# Patient Record
Sex: FEMALE | Race: WHITE | Employment: OTHER | URBAN - NONMETROPOLITAN AREA
[De-identification: names, ages, dates, MRNs, and addresses within clinical notes are randomized per-mention and may not be internally consistent; named-entity substitution may affect disease eponyms.]

---

## 2017-01-05 ENCOUNTER — OFFICE VISIT (OUTPATIENT)
Dept: FAMILY MEDICINE CLINIC | Age: 67
End: 2017-01-05

## 2017-01-05 VITALS
WEIGHT: 168 LBS | HEIGHT: 68 IN | SYSTOLIC BLOOD PRESSURE: 112 MMHG | BODY MASS INDEX: 25.46 KG/M2 | HEART RATE: 64 BPM | DIASTOLIC BLOOD PRESSURE: 78 MMHG

## 2017-01-05 DIAGNOSIS — I47.1 SVT (SUPRAVENTRICULAR TACHYCARDIA) (HCC): Chronic | ICD-10-CM

## 2017-01-05 DIAGNOSIS — M19.90 ARTHRITIS: Chronic | ICD-10-CM

## 2017-01-05 PROCEDURE — 99213 OFFICE O/P EST LOW 20 MIN: CPT | Performed by: FAMILY MEDICINE

## 2017-01-05 RX ORDER — TRAMADOL HYDROCHLORIDE 50 MG/1
50 TABLET ORAL EVERY 6 HOURS PRN
Qty: 200 TABLET | Refills: 1 | Status: SHIPPED | OUTPATIENT
Start: 2017-01-05 | End: 2017-08-24 | Stop reason: SDUPTHER

## 2017-01-05 RX ORDER — CELECOXIB 200 MG/1
200 CAPSULE ORAL 2 TIMES DAILY
Qty: 180 CAPSULE | Refills: 1 | Status: SHIPPED | OUTPATIENT
Start: 2017-01-05 | End: 2017-08-24 | Stop reason: SDUPTHER

## 2017-01-05 RX ORDER — ALPRAZOLAM 0.5 MG/1
0.5 TABLET ORAL 2 TIMES DAILY PRN
Qty: 100 TABLET | Refills: 1 | Status: SHIPPED | OUTPATIENT
Start: 2017-01-05 | End: 2017-08-24 | Stop reason: SDUPTHER

## 2017-01-05 RX ORDER — ATENOLOL 50 MG/1
50 TABLET ORAL DAILY
Qty: 90 TABLET | Refills: 1 | Status: SHIPPED | OUTPATIENT
Start: 2017-01-05 | End: 2017-08-24 | Stop reason: SDUPTHER

## 2017-01-05 ASSESSMENT — ENCOUNTER SYMPTOMS
RESPIRATORY NEGATIVE: 1
BACK PAIN: 1
SHORTNESS OF BREATH: 0
GASTROINTESTINAL NEGATIVE: 1

## 2017-06-27 ENCOUNTER — OFFICE VISIT (OUTPATIENT)
Dept: FAMILY MEDICINE CLINIC | Age: 67
End: 2017-06-27

## 2017-06-27 VITALS
BODY MASS INDEX: 25.79 KG/M2 | WEIGHT: 170.2 LBS | HEART RATE: 64 BPM | SYSTOLIC BLOOD PRESSURE: 144 MMHG | DIASTOLIC BLOOD PRESSURE: 84 MMHG | HEIGHT: 68 IN

## 2017-06-27 DIAGNOSIS — I47.1 SVT (SUPRAVENTRICULAR TACHYCARDIA) (HCC): ICD-10-CM

## 2017-06-27 DIAGNOSIS — M19.90 ARTHRITIS: Primary | ICD-10-CM

## 2017-06-27 DIAGNOSIS — E11.9 TYPE 2 DIABETES MELLITUS WITHOUT COMPLICATION, WITHOUT LONG-TERM CURRENT USE OF INSULIN (HCC): ICD-10-CM

## 2017-06-27 PROCEDURE — 99213 OFFICE O/P EST LOW 20 MIN: CPT | Performed by: FAMILY MEDICINE

## 2017-06-27 ASSESSMENT — ENCOUNTER SYMPTOMS
GASTROINTESTINAL NEGATIVE: 1
RESPIRATORY NEGATIVE: 1
SHORTNESS OF BREATH: 0

## 2017-08-24 DIAGNOSIS — I47.1 SVT (SUPRAVENTRICULAR TACHYCARDIA) (HCC): Chronic | ICD-10-CM

## 2017-08-24 DIAGNOSIS — M19.90 ARTHRITIS: Chronic | ICD-10-CM

## 2017-08-24 RX ORDER — TRAMADOL HYDROCHLORIDE 50 MG/1
50 TABLET ORAL EVERY 6 HOURS PRN
Qty: 200 TABLET | Refills: 1 | Status: SHIPPED | OUTPATIENT
Start: 2017-08-24 | End: 2018-01-19 | Stop reason: SDUPTHER

## 2017-08-24 RX ORDER — ATENOLOL 50 MG/1
50 TABLET ORAL DAILY
Qty: 90 TABLET | Refills: 1 | Status: SHIPPED | OUTPATIENT
Start: 2017-08-24 | End: 2018-01-19 | Stop reason: SDUPTHER

## 2017-08-24 RX ORDER — CELECOXIB 200 MG/1
200 CAPSULE ORAL 2 TIMES DAILY
Qty: 180 CAPSULE | Refills: 1 | Status: SHIPPED | OUTPATIENT
Start: 2017-08-24 | End: 2018-01-19 | Stop reason: SDUPTHER

## 2017-08-24 RX ORDER — ALPRAZOLAM 0.5 MG/1
0.5 TABLET ORAL 2 TIMES DAILY PRN
Qty: 100 TABLET | Refills: 1 | Status: SHIPPED | OUTPATIENT
Start: 2017-08-24 | End: 2018-01-19 | Stop reason: SDUPTHER

## 2017-10-13 ENCOUNTER — HOSPITAL ENCOUNTER (OUTPATIENT)
Dept: MAMMOGRAPHY | Age: 67
Discharge: HOME | End: 2017-10-13
Payer: MEDICARE

## 2017-10-13 DIAGNOSIS — Z12.9 SCREENING FOR CANCER: ICD-10-CM

## 2017-10-13 PROCEDURE — G0202 SCR MAMMO BI INCL CAD: HCPCS

## 2018-01-19 ENCOUNTER — OFFICE VISIT (OUTPATIENT)
Dept: FAMILY MEDICINE CLINIC | Age: 68
End: 2018-01-19
Payer: MEDICARE

## 2018-01-19 ENCOUNTER — HOSPITAL ENCOUNTER (OUTPATIENT)
Age: 68
Discharge: HOME | End: 2018-01-19
Payer: MEDICARE

## 2018-01-19 ENCOUNTER — HOSPITAL ENCOUNTER (OUTPATIENT)
Dept: GENERAL RADIOLOGY | Age: 68
Discharge: HOME | End: 2018-01-19
Payer: MEDICARE

## 2018-01-19 VITALS
HEART RATE: 60 BPM | DIASTOLIC BLOOD PRESSURE: 80 MMHG | WEIGHT: 170.8 LBS | BODY MASS INDEX: 25.88 KG/M2 | SYSTOLIC BLOOD PRESSURE: 136 MMHG | HEIGHT: 68 IN

## 2018-01-19 DIAGNOSIS — E78.5 HYPERLIPIDEMIA, UNSPECIFIED HYPERLIPIDEMIA TYPE: ICD-10-CM

## 2018-01-19 DIAGNOSIS — Z12.11 SPECIAL SCREENING FOR MALIGNANT NEOPLASMS, COLON: ICD-10-CM

## 2018-01-19 DIAGNOSIS — M19.011 PRIMARY OSTEOARTHRITIS OF BOTH SHOULDERS: ICD-10-CM

## 2018-01-19 DIAGNOSIS — I47.1 PAT (PAROXYSMAL ATRIAL TACHYCARDIA) (HCC): ICD-10-CM

## 2018-01-19 DIAGNOSIS — F51.01 PRIMARY INSOMNIA: ICD-10-CM

## 2018-01-19 DIAGNOSIS — M19.90 ARTHRITIS: Chronic | ICD-10-CM

## 2018-01-19 DIAGNOSIS — M19.90 ARTHRITIS: Primary | Chronic | ICD-10-CM

## 2018-01-19 DIAGNOSIS — T50.905A ADVERSE EFFECT OF DRUG, INITIAL ENCOUNTER: ICD-10-CM

## 2018-01-19 DIAGNOSIS — I47.1 SVT (SUPRAVENTRICULAR TACHYCARDIA) (HCC): Chronic | ICD-10-CM

## 2018-01-19 DIAGNOSIS — Z91.81 AT HIGH RISK FOR FALLS: ICD-10-CM

## 2018-01-19 DIAGNOSIS — M19.012 PRIMARY OSTEOARTHRITIS OF BOTH SHOULDERS: ICD-10-CM

## 2018-01-19 PROCEDURE — 73030 X-RAY EXAM OF SHOULDER: CPT

## 2018-01-19 PROCEDURE — 1123F ACP DISCUSS/DSCN MKR DOCD: CPT | Performed by: FAMILY MEDICINE

## 2018-01-19 PROCEDURE — 3014F SCREEN MAMMO DOC REV: CPT | Performed by: FAMILY MEDICINE

## 2018-01-19 PROCEDURE — 4040F PNEUMOC VAC/ADMIN/RCVD: CPT | Performed by: FAMILY MEDICINE

## 2018-01-19 PROCEDURE — 99214 OFFICE O/P EST MOD 30 MIN: CPT | Performed by: FAMILY MEDICINE

## 2018-01-19 PROCEDURE — 1090F PRES/ABSN URINE INCON ASSESS: CPT | Performed by: FAMILY MEDICINE

## 2018-01-19 PROCEDURE — G8419 CALC BMI OUT NRM PARAM NOF/U: HCPCS | Performed by: FAMILY MEDICINE

## 2018-01-19 PROCEDURE — G8400 PT W/DXA NO RESULTS DOC: HCPCS | Performed by: FAMILY MEDICINE

## 2018-01-19 PROCEDURE — 3017F COLORECTAL CA SCREEN DOC REV: CPT | Performed by: FAMILY MEDICINE

## 2018-01-19 PROCEDURE — 1036F TOBACCO NON-USER: CPT | Performed by: FAMILY MEDICINE

## 2018-01-19 PROCEDURE — G8484 FLU IMMUNIZE NO ADMIN: HCPCS | Performed by: FAMILY MEDICINE

## 2018-01-19 PROCEDURE — G8427 DOCREV CUR MEDS BY ELIG CLIN: HCPCS | Performed by: FAMILY MEDICINE

## 2018-01-19 RX ORDER — CELECOXIB 200 MG/1
200 CAPSULE ORAL 2 TIMES DAILY
Qty: 180 CAPSULE | Refills: 1 | Status: SHIPPED | OUTPATIENT
Start: 2018-01-19 | End: 2018-07-19 | Stop reason: SDUPTHER

## 2018-01-19 RX ORDER — ALPRAZOLAM 0.5 MG/1
0.5 TABLET ORAL 2 TIMES DAILY PRN
Qty: 60 TABLET | Refills: 2 | Status: SHIPPED | OUTPATIENT
Start: 2018-01-19 | End: 2018-01-23 | Stop reason: CLARIF

## 2018-01-19 RX ORDER — TRAMADOL HYDROCHLORIDE 50 MG/1
50 TABLET ORAL EVERY 6 HOURS PRN
Qty: 100 TABLET | Refills: 3 | Status: SHIPPED | OUTPATIENT
Start: 2018-01-19 | End: 2018-08-14 | Stop reason: SDUPTHER

## 2018-01-19 RX ORDER — ATENOLOL 50 MG/1
50 TABLET ORAL DAILY
Qty: 90 TABLET | Refills: 1 | Status: SHIPPED | OUTPATIENT
Start: 2018-01-19 | End: 2018-07-19 | Stop reason: SDUPTHER

## 2018-01-19 ASSESSMENT — ENCOUNTER SYMPTOMS
GASTROINTESTINAL NEGATIVE: 1
WHEEZING: 0
ABDOMINAL PAIN: 0
SHORTNESS OF BREATH: 0
BACK PAIN: 0
RESPIRATORY NEGATIVE: 1

## 2018-01-19 ASSESSMENT — PATIENT HEALTH QUESTIONNAIRE - PHQ9
SUM OF ALL RESPONSES TO PHQ QUESTIONS 1-9: 0
1. LITTLE INTEREST OR PLEASURE IN DOING THINGS: 0
SUM OF ALL RESPONSES TO PHQ9 QUESTIONS 1 & 2: 0
2. FEELING DOWN, DEPRESSED OR HOPELESS: 0

## 2018-01-19 NOTE — PROGRESS NOTES
On the basis of positive falls risk screening, assessment and plan is as follows: home safety tips provided.
Metabolic Panel    Lipid Panel   5. Adverse effect of drug, initial encounter  CBC Auto Differential    Comprehensive Metabolic Panel    Lipid Panel   6. Hyperlipidemia, unspecified hyperlipidemia type  CBC Auto Differential    Comprehensive Metabolic Panel    Lipid Panel   7. At high risk for falls     8. Special screening for malignant neoplasms, colon  POCT Fecal Immunochemical Test (FIT)       Plan:    Needs XR's and ortho. Check labs. Controlled Substances Monitoring:     Attestation: The Prescription Monitoring Report for this patient was reviewed today. (Danielle Jarvis MD)  Documentation: No signs of potential drug abuse or diversion identified. (Danielle Jarvis MD)  Chronic Pain: Treatment objectives documented - patient is progressing appropriately. , Functional status reviewed - continues with improved or maintaining ADL's. (Danielle Jarvis MD)    No Follow-up on file.     Orders Placed:  Orders Placed This Encounter   Procedures    XR SHOULDER LEFT (MIN 2 VIEWS)     Standing Status:   Future     Number of Occurrences:   1     Standing Expiration Date:   1/19/2019     Order Specific Question:   Reason for exam:     Answer:   L>R pain and loss of motion    XR SHOULDER RIGHT (MIN 2 VIEWS)     Standing Status:   Future     Number of Occurrences:   1     Standing Expiration Date:   1/19/2019     Order Specific Question:   Reason for exam:     Answer:   L>R pain and loss of motion    CBC Auto Differential     Standing Status:   Future     Standing Expiration Date:   1/19/2019    Comprehensive Metabolic Panel     Standing Status:   Future     Standing Expiration Date:   1/19/2019    Lipid Panel     Standing Status:   Future     Standing Expiration Date:   1/19/2019     Order Specific Question:   Is Patient Fasting?/# of Hours     Answer:   12 hours    POCT Fecal Immunochemical Test (FIT)     Standing Status:   Future     Standing Expiration Date:   2/19/2018     Medications Prescribed:  Orders Placed This

## 2018-01-22 ENCOUNTER — TELEPHONE (OUTPATIENT)
Dept: FAMILY MEDICINE CLINIC | Age: 68
End: 2018-01-22

## 2018-01-22 NOTE — TELEPHONE ENCOUNTER
----- Message from Gerber Stewart MD sent at 1/22/2018  7:35 AM EST -----  XR shows a lot of arthritis and calcified bursitis. Could try some PT for better function, but should see ortho.

## 2018-01-22 NOTE — TELEPHONE ENCOUNTER
----- Message from Miley Martínez MD sent at 1/22/2018  7:35 AM EST -----  XR shows a lot of arthritis and calcified bursitis. Could try some PT for better function, but should see ortho.

## 2018-01-25 ENCOUNTER — HOSPITAL ENCOUNTER (OUTPATIENT)
Dept: OCCUPATIONAL THERAPY | Age: 68
Setting detail: THERAPIES SERIES
Discharge: HOME | End: 2018-01-25
Payer: MEDICARE

## 2018-01-25 PROCEDURE — G8984 CARRY CURRENT STATUS: HCPCS

## 2018-01-25 PROCEDURE — 97165 OT EVAL LOW COMPLEX 30 MIN: CPT

## 2018-01-25 PROCEDURE — G8985 CARRY GOAL STATUS: HCPCS

## 2018-01-25 PROCEDURE — 97535 SELF CARE MNGMENT TRAINING: CPT

## 2018-01-25 ASSESSMENT — PAIN DESCRIPTION - LOCATION: LOCATION: SHOULDER

## 2018-01-25 ASSESSMENT — PAIN DESCRIPTION - ORIENTATION: ORIENTATION: RIGHT;LEFT

## 2018-01-25 ASSESSMENT — PAIN DESCRIPTION - PAIN TYPE: TYPE: CHRONIC PAIN

## 2018-01-25 ASSESSMENT — PAIN SCALES - GENERAL: PAINLEVEL_OUTOF10: 5

## 2018-01-29 ENCOUNTER — HOSPITAL ENCOUNTER (OUTPATIENT)
Dept: OCCUPATIONAL THERAPY | Age: 68
Setting detail: THERAPIES SERIES
Discharge: HOME | End: 2018-01-29
Payer: MEDICARE

## 2018-01-29 PROCEDURE — 97110 THERAPEUTIC EXERCISES: CPT

## 2018-01-29 ASSESSMENT — PAIN SCALES - GENERAL: PAINLEVEL_OUTOF10: 4

## 2018-01-29 ASSESSMENT — PAIN DESCRIPTION - LOCATION: LOCATION: SHOULDER

## 2018-01-29 ASSESSMENT — PAIN DESCRIPTION - ORIENTATION: ORIENTATION: RIGHT;LEFT

## 2018-02-01 ENCOUNTER — NURSE ONLY (OUTPATIENT)
Dept: FAMILY MEDICINE CLINIC | Age: 68
End: 2018-02-01
Payer: MEDICARE

## 2018-02-01 DIAGNOSIS — M19.90 ARTHRITIS: Chronic | ICD-10-CM

## 2018-02-01 DIAGNOSIS — T50.905A ADVERSE EFFECT OF DRUG, INITIAL ENCOUNTER: ICD-10-CM

## 2018-02-01 DIAGNOSIS — E78.5 HYPERLIPIDEMIA, UNSPECIFIED HYPERLIPIDEMIA TYPE: ICD-10-CM

## 2018-02-01 LAB
ALBUMIN SERPL-MCNC: 4.7 G/DL (ref 3.5–5.1)
ALP BLD-CCNC: 106 U/L (ref 38–126)
ALT SERPL-CCNC: 16 U/L (ref 11–66)
ANION GAP SERPL CALCULATED.3IONS-SCNC: 15 MEQ/L (ref 8–16)
AST SERPL-CCNC: 22 U/L (ref 5–40)
BASOPHILS # BLD: 0.6 %
BASOPHILS ABSOLUTE: 0 THOU/MM3 (ref 0–0.1)
BILIRUB SERPL-MCNC: 0.5 MG/DL (ref 0.3–1.2)
BUN BLDV-MCNC: 21 MG/DL (ref 7–22)
CALCIUM SERPL-MCNC: 9.5 MG/DL (ref 8.5–10.5)
CHLORIDE BLD-SCNC: 103 MEQ/L (ref 98–111)
CHOLESTEROL, TOTAL: 204 MG/DL (ref 100–199)
CO2: 27 MEQ/L (ref 23–33)
CREAT SERPL-MCNC: 0.6 MG/DL (ref 0.4–1.2)
EOSINOPHIL # BLD: 3.6 %
EOSINOPHILS ABSOLUTE: 0.1 THOU/MM3 (ref 0–0.4)
GFR SERPL CREATININE-BSD FRML MDRD: > 90 ML/MIN/1.73M2
GLUCOSE BLD-MCNC: 139 MG/DL (ref 70–108)
HCT VFR BLD CALC: 41.8 % (ref 37–47)
HDLC SERPL-MCNC: 65 MG/DL
HEMOGLOBIN: 13.9 GM/DL (ref 12–16)
LDL CHOLESTEROL CALCULATED: 121 MG/DL
LYMPHOCYTES # BLD: 33 %
LYMPHOCYTES ABSOLUTE: 1.4 THOU/MM3 (ref 1–4.8)
MCH RBC QN AUTO: 30.2 PG (ref 27–31)
MCHC RBC AUTO-ENTMCNC: 33.4 GM/DL (ref 33–37)
MCV RBC AUTO: 90.4 FL (ref 81–99)
MONOCYTES # BLD: 6.4 %
MONOCYTES ABSOLUTE: 0.3 THOU/MM3 (ref 0.4–1.3)
NUCLEATED RED BLOOD CELLS: 0 /100 WBC
PDW BLD-RTO: 13.4 % (ref 11.5–14.5)
PLATELET # BLD: 204 THOU/MM3 (ref 130–400)
PMV BLD AUTO: 8.3 FL (ref 7.4–10.4)
POTASSIUM SERPL-SCNC: 4.5 MEQ/L (ref 3.5–5.2)
RBC # BLD: 4.62 MILL/MM3 (ref 4.2–5.4)
SEG NEUTROPHILS: 56.4 %
SEGMENTED NEUTROPHILS ABSOLUTE COUNT: 2.3 THOU/MM3 (ref 1.8–7.7)
SODIUM BLD-SCNC: 145 MEQ/L (ref 135–145)
TOTAL PROTEIN: 7.3 G/DL (ref 6.1–8)
TRIGL SERPL-MCNC: 88 MG/DL (ref 0–199)
WBC # BLD: 4.1 THOU/MM3 (ref 4.8–10.8)

## 2018-02-01 PROCEDURE — 36415 COLL VENOUS BLD VENIPUNCTURE: CPT | Performed by: FAMILY MEDICINE

## 2018-02-02 ENCOUNTER — TELEPHONE (OUTPATIENT)
Dept: FAMILY MEDICINE CLINIC | Age: 68
End: 2018-02-02

## 2018-02-06 ENCOUNTER — APPOINTMENT (OUTPATIENT)
Dept: OCCUPATIONAL THERAPY | Age: 68
End: 2018-02-06
Payer: MEDICARE

## 2018-02-06 ENCOUNTER — TELEPHONE (OUTPATIENT)
Dept: FAMILY MEDICINE CLINIC | Age: 68
End: 2018-02-06

## 2018-02-06 DIAGNOSIS — Z12.11 SPECIAL SCREENING FOR MALIGNANT NEOPLASMS, COLON: ICD-10-CM

## 2018-02-06 LAB
CONTROL: PRESENT
HEMOCCULT STL QL: NEGATIVE

## 2018-02-06 PROCEDURE — 82274 ASSAY TEST FOR BLOOD FECAL: CPT | Performed by: FAMILY MEDICINE

## 2018-02-08 ENCOUNTER — HOSPITAL ENCOUNTER (OUTPATIENT)
Dept: OCCUPATIONAL THERAPY | Age: 68
Setting detail: THERAPIES SERIES
Discharge: HOME | End: 2018-02-08
Payer: MEDICARE

## 2018-02-08 PROCEDURE — 97110 THERAPEUTIC EXERCISES: CPT

## 2018-02-08 ASSESSMENT — PAIN DESCRIPTION - LOCATION: LOCATION: SHOULDER

## 2018-02-08 ASSESSMENT — PAIN DESCRIPTION - ORIENTATION: ORIENTATION: RIGHT;LEFT

## 2018-02-08 ASSESSMENT — PAIN SCALES - GENERAL: PAINLEVEL_OUTOF10: 4

## 2018-02-14 ENCOUNTER — HOSPITAL ENCOUNTER (OUTPATIENT)
Dept: OCCUPATIONAL THERAPY | Age: 68
Setting detail: THERAPIES SERIES
Discharge: HOME | End: 2018-02-14
Payer: MEDICARE

## 2018-02-14 PROCEDURE — 97110 THERAPEUTIC EXERCISES: CPT

## 2018-02-22 ENCOUNTER — HOSPITAL ENCOUNTER (OUTPATIENT)
Dept: OCCUPATIONAL THERAPY | Age: 68
Setting detail: THERAPIES SERIES
Discharge: HOME | End: 2018-02-22
Payer: MEDICARE

## 2018-02-22 PROCEDURE — G8985 CARRY GOAL STATUS: HCPCS

## 2018-02-22 PROCEDURE — 97110 THERAPEUTIC EXERCISES: CPT

## 2018-02-22 PROCEDURE — 97535 SELF CARE MNGMENT TRAINING: CPT

## 2018-02-22 PROCEDURE — G8984 CARRY CURRENT STATUS: HCPCS

## 2018-02-22 NOTE — PROGRESS NOTES
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 Short term goal 3: Pt. will voice understanding of possible adaptive equipment that would increase ease with daily tasks and decrease stress on her joints. GOAL NOT MET - CONTINUE GOAL  Short term goal 4: Pt. will voice understanding of pain control strategies she can incorporate at home such as moist heat etc. for pain management. GOAL MET - GOAL DISCONTINUED  Long term goals  Time Frame for Long term goals : 4 weeks  Long term goal 1: GOAL INTIIATED: Report being able to complete hair care with  no more than moderate difficulty. Long term goal 2: GOAL INITIATED: Be able to use left arm to make a bank deposit with no more than moderate difficulty. OT G-codes  Functional Assessment Tool Used: UE Functional Scale  Score: 59  Functional Limitation: Carrying, moving and handling objects  Carrying, Moving and Handling Objects Current Status (): At least 20 percent but less than 40 percent impaired, limited or restricted  Carrying, Moving and Handling Objects Goal Status ():  At least 1 percent but less than 20 percent impaired, limited or restricted       Hawk Counter, OTR/L #[id number]

## 2018-03-01 ENCOUNTER — HOSPITAL ENCOUNTER (OUTPATIENT)
Dept: OCCUPATIONAL THERAPY | Age: 68
Setting detail: THERAPIES SERIES
Discharge: HOME | End: 2018-03-01
Payer: MEDICARE

## 2018-03-01 PROCEDURE — 97110 THERAPEUTIC EXERCISES: CPT

## 2018-03-06 ENCOUNTER — HOSPITAL ENCOUNTER (OUTPATIENT)
Dept: OCCUPATIONAL THERAPY | Age: 68
Setting detail: THERAPIES SERIES
Discharge: HOME | End: 2018-03-06
Payer: MEDICARE

## 2018-03-06 PROCEDURE — 97110 THERAPEUTIC EXERCISES: CPT

## 2018-03-08 ENCOUNTER — APPOINTMENT (OUTPATIENT)
Dept: OCCUPATIONAL THERAPY | Age: 68
End: 2018-03-08
Payer: MEDICARE

## 2018-03-15 ENCOUNTER — HOSPITAL ENCOUNTER (OUTPATIENT)
Dept: OCCUPATIONAL THERAPY | Age: 68
Setting detail: THERAPIES SERIES
Discharge: HOME | End: 2018-03-15
Payer: MEDICARE

## 2018-03-15 PROCEDURE — 97110 THERAPEUTIC EXERCISES: CPT

## 2018-03-15 ASSESSMENT — PAIN SCALES - GENERAL: PAINLEVEL_OUTOF10: 1

## 2018-03-15 ASSESSMENT — PAIN DESCRIPTION - ORIENTATION: ORIENTATION: RIGHT;LEFT

## 2018-03-15 ASSESSMENT — PAIN DESCRIPTION - LOCATION: LOCATION: SHOULDER

## 2018-03-19 ENCOUNTER — HOSPITAL ENCOUNTER (OUTPATIENT)
Dept: OCCUPATIONAL THERAPY | Age: 68
Setting detail: THERAPIES SERIES
Discharge: HOME | End: 2018-03-19
Payer: MEDICARE

## 2018-03-19 PROCEDURE — 97110 THERAPEUTIC EXERCISES: CPT

## 2018-03-19 PROCEDURE — G8985 CARRY GOAL STATUS: HCPCS

## 2018-03-19 PROCEDURE — G8984 CARRY CURRENT STATUS: HCPCS

## 2018-03-26 ENCOUNTER — HOSPITAL ENCOUNTER (OUTPATIENT)
Dept: OCCUPATIONAL THERAPY | Age: 68
Setting detail: THERAPIES SERIES
Discharge: HOME | End: 2018-03-26
Payer: MEDICARE

## 2018-03-26 PROCEDURE — 97110 THERAPEUTIC EXERCISES: CPT

## 2018-03-26 ASSESSMENT — PAIN SCALES - GENERAL: PAINLEVEL_OUTOF10: 2

## 2018-03-26 ASSESSMENT — PAIN DESCRIPTION - LOCATION: LOCATION: ARM;SHOULDER

## 2018-03-26 ASSESSMENT — PAIN DESCRIPTION - ORIENTATION: ORIENTATION: RIGHT;LEFT

## 2018-03-26 NOTE — PROGRESS NOTES
[facility]  OUTPATIENT OCCUPATIONAL THERAPY  Daily Note  [address] [facility]    Time In: 915  Time Out: 1000  Minutes: 45  Timed Code Treatment Minutes: 45 Minutes     Date: 3/26/2018  Patient Name: Jimmy Dolan        CSN: [id number]   : 1950  (78 y.o.)  Gender: female   Referring Practitioner: Dr. 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 Herrera Arellano       Restrictions/Precautions:  Restrictions/Precautions: General Precautions              Subjective:  Subjective: States that she thinks that her arms are \"doing pretty good. \" States that she has some aching today due to the upcoming rains. Pain:  Patient Currently in Pain: Yes (Reports that she feels as though her pain today is due to the upcoming weather changes.)  Pain Assessment: 0-10  Pain Level: 2  Pain Location: Arm, Shoulder  Pain Orientation: Right, Left       Objective:     Upper Extremity Function  UE PROM: pulleys for shoulder flexion and abduction x 15 reps each; aggressive supine PROM to both shoulders in all planes to patient tolerance  UE Stretching: bilateral shoulder stretch on wall x 5 reps; stretch with both hands behind head x 10 reps while supine  UE Strengthing: biodex at 80 speed x 3 minutes forward and 3 minutes backward; reaching activity with each UE - placing and removing clothespins from vertical post to patient's maximal shoulder flexion                                                Activity Tolerance: Additional Comments:  Tolereated treatment well    Assessment:  Assessment: Progressing toward goals    Patient Education:  Patient Education: ER stretch behind head while supine or reclined in chair            Plan:  Plan Comment: Continue per established POC  Specific instructions for Next Treatment: AROM left shoulder, stretching, ADL                      Darci Stephens, OTR/L #[id number]

## 2018-04-02 ENCOUNTER — HOSPITAL ENCOUNTER (OUTPATIENT)
Dept: OCCUPATIONAL THERAPY | Age: 68
Setting detail: THERAPIES SERIES
Discharge: HOME | End: 2018-04-02
Payer: MEDICARE

## 2018-04-02 PROCEDURE — 97110 THERAPEUTIC EXERCISES: CPT

## 2018-04-09 ENCOUNTER — HOSPITAL ENCOUNTER (OUTPATIENT)
Dept: OCCUPATIONAL THERAPY | Age: 68
Setting detail: THERAPIES SERIES
Discharge: HOME | End: 2018-04-09
Payer: MEDICARE

## 2018-04-09 PROCEDURE — 97110 THERAPEUTIC EXERCISES: CPT

## 2018-04-16 ENCOUNTER — HOSPITAL ENCOUNTER (OUTPATIENT)
Dept: OCCUPATIONAL THERAPY | Age: 68
Setting detail: THERAPIES SERIES
Discharge: HOME | End: 2018-04-16
Payer: MEDICARE

## 2018-04-16 PROCEDURE — G8986 CARRY D/C STATUS: HCPCS

## 2018-04-16 PROCEDURE — 97110 THERAPEUTIC EXERCISES: CPT

## 2018-04-16 PROCEDURE — G8985 CARRY GOAL STATUS: HCPCS

## 2018-07-19 ENCOUNTER — OFFICE VISIT (OUTPATIENT)
Dept: FAMILY MEDICINE CLINIC | Age: 68
End: 2018-07-19
Payer: MEDICARE

## 2018-07-19 VITALS
DIASTOLIC BLOOD PRESSURE: 80 MMHG | BODY MASS INDEX: 26.07 KG/M2 | SYSTOLIC BLOOD PRESSURE: 138 MMHG | WEIGHT: 172 LBS | HEIGHT: 68 IN

## 2018-07-19 DIAGNOSIS — M19.90 ARTHRITIS: Primary | Chronic | ICD-10-CM

## 2018-07-19 DIAGNOSIS — E11.9 TYPE 2 DIABETES MELLITUS WITHOUT COMPLICATION, WITHOUT LONG-TERM CURRENT USE OF INSULIN (HCC): Chronic | ICD-10-CM

## 2018-07-19 DIAGNOSIS — I47.1 SVT (SUPRAVENTRICULAR TACHYCARDIA) (HCC): Chronic | ICD-10-CM

## 2018-07-19 LAB — HBA1C MFR BLD: 6.1 %

## 2018-07-19 PROCEDURE — 3044F HG A1C LEVEL LT 7.0%: CPT | Performed by: FAMILY MEDICINE

## 2018-07-19 PROCEDURE — 1036F TOBACCO NON-USER: CPT | Performed by: FAMILY MEDICINE

## 2018-07-19 PROCEDURE — 1090F PRES/ABSN URINE INCON ASSESS: CPT | Performed by: FAMILY MEDICINE

## 2018-07-19 PROCEDURE — G8419 CALC BMI OUT NRM PARAM NOF/U: HCPCS | Performed by: FAMILY MEDICINE

## 2018-07-19 PROCEDURE — 2022F DILAT RTA XM EVC RTNOPTHY: CPT | Performed by: FAMILY MEDICINE

## 2018-07-19 PROCEDURE — 99213 OFFICE O/P EST LOW 20 MIN: CPT | Performed by: FAMILY MEDICINE

## 2018-07-19 PROCEDURE — G8427 DOCREV CUR MEDS BY ELIG CLIN: HCPCS | Performed by: FAMILY MEDICINE

## 2018-07-19 PROCEDURE — G8400 PT W/DXA NO RESULTS DOC: HCPCS | Performed by: FAMILY MEDICINE

## 2018-07-19 PROCEDURE — 1101F PT FALLS ASSESS-DOCD LE1/YR: CPT | Performed by: FAMILY MEDICINE

## 2018-07-19 PROCEDURE — 83036 HEMOGLOBIN GLYCOSYLATED A1C: CPT | Performed by: FAMILY MEDICINE

## 2018-07-19 PROCEDURE — 4040F PNEUMOC VAC/ADMIN/RCVD: CPT | Performed by: FAMILY MEDICINE

## 2018-07-19 PROCEDURE — 1123F ACP DISCUSS/DSCN MKR DOCD: CPT | Performed by: FAMILY MEDICINE

## 2018-07-19 PROCEDURE — 3017F COLORECTAL CA SCREEN DOC REV: CPT | Performed by: FAMILY MEDICINE

## 2018-07-19 RX ORDER — ATENOLOL 50 MG/1
50 TABLET ORAL DAILY
Qty: 90 TABLET | Refills: 1 | Status: SHIPPED | OUTPATIENT
Start: 2018-07-19 | End: 2018-07-19 | Stop reason: SDUPTHER

## 2018-07-19 RX ORDER — ATENOLOL 50 MG/1
50 TABLET ORAL DAILY
Qty: 90 TABLET | Refills: 1 | Status: SHIPPED | OUTPATIENT
Start: 2018-07-19 | End: 2019-01-17 | Stop reason: SDUPTHER

## 2018-07-19 RX ORDER — CELECOXIB 200 MG/1
200 CAPSULE ORAL 2 TIMES DAILY
Qty: 180 CAPSULE | Refills: 1 | Status: SHIPPED | OUTPATIENT
Start: 2018-07-19 | End: 2018-07-19 | Stop reason: SDUPTHER

## 2018-07-19 RX ORDER — CELECOXIB 200 MG/1
200 CAPSULE ORAL 2 TIMES DAILY
Qty: 180 CAPSULE | Refills: 1 | Status: SHIPPED | OUTPATIENT
Start: 2018-07-19 | End: 2019-01-17 | Stop reason: SDUPTHER

## 2018-07-19 RX ORDER — ALPRAZOLAM 0.5 MG/1
0.5 TABLET ORAL 2 TIMES DAILY PRN
COMMUNITY
End: 2018-09-14 | Stop reason: SDUPTHER

## 2018-07-19 ASSESSMENT — ENCOUNTER SYMPTOMS
SHORTNESS OF BREATH: 0
RESPIRATORY NEGATIVE: 1
GASTROINTESTINAL NEGATIVE: 1

## 2018-07-19 NOTE — PROGRESS NOTES
motion. Neck supple. No thyromegaly present. Cardiovascular: Normal rate, regular rhythm and normal heart sounds. Exam reveals no gallop and no friction rub. No murmur heard. Pulmonary/Chest: Effort normal and breath sounds normal.   Abdominal: Soft. She exhibits no mass. There is no splenomegaly or hepatomegaly. There is no tenderness. Musculoskeletal: She exhibits no edema or tenderness. Joint deformities. Neck with decreased ROM. Lymphadenopathy:     She has no cervical adenopathy. Neurological: She is alert and oriented to person, place, and time. Ambulatory. No tremors. Skin: Skin is warm and dry. No rash noted. Psychiatric: She has a normal mood and affect. Vitals reviewed. Assessment:       Diagnosis Orders   1. Arthritis  celecoxib (CELEBREX) 200 MG capsule    DISCONTINUED: celecoxib (CELEBREX) 200 MG capsule   2. SVT (supraventricular tachycardia) (HCC)  atenolol (TENORMIN) 50 MG tablet    DISCONTINUED: atenolol (TENORMIN) 50 MG tablet   3. Type 2 diabetes mellitus without complication, without long-term current use of insulin (HCC)  POCT glycosylated hemoglobin (Hb A1C)       Plan:    A1C today is 6.1%. Return in about 6 months (around 1/19/2019) for arthritis, check-up.     Orders Placed:  Orders Placed This Encounter   Procedures    POCT glycosylated hemoglobin (Hb A1C)     Medications Prescribed:  Orders Placed This Encounter   Medications    DISCONTD: atenolol (TENORMIN) 50 MG tablet     Sig: Take 1 tablet by mouth daily     Dispense:  90 tablet     Refill:  1    DISCONTD: celecoxib (CELEBREX) 200 MG capsule     Sig: Take 1 capsule by mouth 2 times daily     Dispense:  180 capsule     Refill:  1    atenolol (TENORMIN) 50 MG tablet     Sig: Take 1 tablet by mouth daily     Dispense:  90 tablet     Refill:  1    celecoxib (CELEBREX) 200 MG capsule     Sig: Take 1 capsule by mouth 2 times daily     Dispense:  180 capsule     Refill:  1         Electronically

## 2018-07-19 NOTE — PATIENT INSTRUCTIONS
Patient Education        Arthritis: Care Instructions  Your Care Instructions  Arthritis, also called osteoarthritis, is a breakdown of the cartilage that cushions your joints. When the cartilage wears down, your bones rub against each other. This causes pain and stiffness. Many people have some arthritis as they age. Arthritis most often affects the joints of the spine, hands, hips, knees, or feet. You can take simple measures to protect your joints, ease your pain, and help you stay active. Follow-up care is a key part of your treatment and safety. Be sure to make and go to all appointments, and call your doctor if you are having problems. It's also a good idea to know your test results and keep a list of the medicines you take. How can you care for yourself at home? · Stay at a healthy weight. Being overweight puts extra strain on your joints. · Talk to your doctor or physical therapist about exercises that will help ease joint pain. ¨ Stretch. You may enjoy gentle forms of yoga to help keep your joints and muscles flexible. ¨ Walk instead of jog. Other types of exercise that are less stressful on the joints include riding a bicycle, swimming, sabi chi, or water exercise. ¨ Lift weights. Strong muscles help reduce stress on your joints. Stronger thigh muscles, for example, take some of the stress off of the knees and hips. Learn the right way to lift weights so you do not make joint pain worse. · Take your medicines exactly as prescribed. Call your doctor if you think you are having a problem with your medicine. · Take pain medicines exactly as directed. ¨ If the doctor gave you a prescription medicine for pain, take it as prescribed. ¨ If you are not taking a prescription pain medicine, ask your doctor if you can take an over-the-counter medicine. · Use a cane, crutch, walker, or another device if you need help to get around. These can help rest your joints.  You also can use other things to make life easier, such as a higher toilet seat and padded handles on kitchen utensils. · Do not sit in low chairs, which can make it hard to get up. · Put heat or cold on your sore joints as needed. Use whichever helps you most. You also can take turns with hot and cold packs. ¨ Apply heat 2 or 3 times a day for 20 to 30 minutes-using a heating pad, hot shower, or hot pack-to relieve pain and stiffness. ¨ Put ice or a cold pack on your sore joint for 10 to 20 minutes at a time. Put a thin cloth between the ice and your skin. When should you call for help? Call your doctor now or seek immediate medical care if:    · You have sudden swelling, warmth, or pain in any joint.     · You have joint pain and a fever or rash.     · You have such bad pain that you cannot use a joint.    Watch closely for changes in your health, and be sure to contact your doctor if:    · You have mild joint symptoms that continue even with more than 6 weeks of care at home.     · You have stomach pain or other problems with your medicine. Where can you learn more? Go to https://[employer].bulletn.. org and sign in to your [employer] account. Enter B296 in the [employer] box to learn more about \"Arthritis: Care Instructions. \"     If you do not have an account, please click on the \"Sign Up Now\" link. Current as of: October 10, 2017  Content Version: 11.6  © 6836-9357 [employer] Care instructions adapted under license by [facility] ([facility]). If you have questions about a medical condition or this instruction, always ask your healthcare professional. [place] [address] any warranty or liability for your use of this information.

## 2018-08-14 DIAGNOSIS — M19.90 ARTHRITIS: Chronic | ICD-10-CM

## 2018-08-14 RX ORDER — TRAMADOL HYDROCHLORIDE 50 MG/1
50 TABLET ORAL EVERY 6 HOURS PRN
Qty: 100 TABLET | Refills: 2 | Status: SHIPPED | OUTPATIENT
Start: 2018-08-14 | End: 2019-01-07 | Stop reason: SDUPTHER

## 2018-08-14 NOTE — TELEPHONE ENCOUNTER
Fantasma Toney called requesting a refill on the following medications:  Requested Prescriptions     Pending Prescriptions Disp Refills    traMADol (ULTRAM) 50 MG tablet 100 tablet 3     Sig: Take 1 tablet by mouth every 6 hours as needed for Pain for up to 30 days. .     Pharmacy verified: DDD  . pv      Date of last visit: 7/19/18  Date of next visit (if applicable): 1/40/8376

## 2018-09-05 ENCOUNTER — HOSPITAL ENCOUNTER (OUTPATIENT)
Age: 68
Discharge: HOME | End: 2018-09-05
Payer: MEDICARE

## 2018-09-05 LAB
ALT SERPL-CCNC: 15 U/L (ref 11–66)
AST SERPL-CCNC: 19 U/L (ref 5–40)

## 2018-09-05 PROCEDURE — 36415 COLL VENOUS BLD VENIPUNCTURE: CPT

## 2018-09-05 PROCEDURE — 84450 TRANSFERASE (AST) (SGOT): CPT

## 2018-09-05 PROCEDURE — 84460 ALANINE AMINO (ALT) (SGPT): CPT

## 2018-09-14 DIAGNOSIS — I47.1 SVT (SUPRAVENTRICULAR TACHYCARDIA) (HCC): Chronic | ICD-10-CM

## 2018-09-14 DIAGNOSIS — F51.01 PRIMARY INSOMNIA: Primary | ICD-10-CM

## 2018-09-14 RX ORDER — ATENOLOL 50 MG/1
50 TABLET ORAL DAILY
Qty: 90 TABLET | Refills: 1 | Status: CANCELLED | OUTPATIENT
Start: 2018-09-14 | End: 2018-12-13

## 2018-09-14 RX ORDER — ALPRAZOLAM 0.5 MG/1
0.5 TABLET ORAL 2 TIMES DAILY PRN
Qty: 60 TABLET | Refills: 2 | Status: SHIPPED | OUTPATIENT
Start: 2018-09-14 | End: 2019-04-05 | Stop reason: SDUPTHER

## 2018-09-14 NOTE — TELEPHONE ENCOUNTER
Patient has a refill of the Atenolol. Pete Doherty called requesting a refill on the following medications:  Requested Prescriptions     Pending Prescriptions Disp Refills    ALPRAZolam (XANAX) 0.5 MG tablet 60 tablet 2     Sig: Take 1 tablet by mouth 2 times daily as needed for Sleep or Anxiety. .       Date of last visit: 7/19/2018  Date of next visit (if applicable):1/17/2019  Date of last refill: #60 x 2 on 1/19/2018  Pharmacy Name: Rosita Martinez RN

## 2018-10-24 ENCOUNTER — HOSPITAL ENCOUNTER (OUTPATIENT)
Dept: MAMMOGRAPHY | Age: 68
Discharge: HOME | End: 2018-10-24
Payer: MEDICARE

## 2018-10-24 DIAGNOSIS — Z12.39 SCREENING BREAST EXAMINATION: ICD-10-CM

## 2018-10-24 PROCEDURE — 77067 SCR MAMMO BI INCL CAD: CPT

## 2019-01-14 ENCOUNTER — TELEPHONE (OUTPATIENT)
Dept: FAMILY MEDICINE CLINIC | Age: 69
End: 2019-01-14

## 2019-01-17 ENCOUNTER — OFFICE VISIT (OUTPATIENT)
Dept: FAMILY MEDICINE CLINIC | Age: 69
End: 2019-01-17
Payer: MEDICARE

## 2019-01-17 VITALS
SYSTOLIC BLOOD PRESSURE: 136 MMHG | DIASTOLIC BLOOD PRESSURE: 88 MMHG | HEART RATE: 72 BPM | HEIGHT: 68 IN | WEIGHT: 181 LBS | BODY MASS INDEX: 27.43 KG/M2

## 2019-01-17 DIAGNOSIS — I47.1 SVT (SUPRAVENTRICULAR TACHYCARDIA) (HCC): Chronic | ICD-10-CM

## 2019-01-17 DIAGNOSIS — M19.90 ARTHRITIS: Chronic | ICD-10-CM

## 2019-01-17 DIAGNOSIS — E11.9 TYPE 2 DIABETES MELLITUS WITHOUT COMPLICATION, WITHOUT LONG-TERM CURRENT USE OF INSULIN (HCC): ICD-10-CM

## 2019-01-17 PROCEDURE — 3017F COLORECTAL CA SCREEN DOC REV: CPT | Performed by: FAMILY MEDICINE

## 2019-01-17 PROCEDURE — 99214 OFFICE O/P EST MOD 30 MIN: CPT | Performed by: FAMILY MEDICINE

## 2019-01-17 PROCEDURE — G8427 DOCREV CUR MEDS BY ELIG CLIN: HCPCS | Performed by: FAMILY MEDICINE

## 2019-01-17 PROCEDURE — 1123F ACP DISCUSS/DSCN MKR DOCD: CPT | Performed by: FAMILY MEDICINE

## 2019-01-17 PROCEDURE — G8419 CALC BMI OUT NRM PARAM NOF/U: HCPCS | Performed by: FAMILY MEDICINE

## 2019-01-17 PROCEDURE — G8484 FLU IMMUNIZE NO ADMIN: HCPCS | Performed by: FAMILY MEDICINE

## 2019-01-17 PROCEDURE — 1036F TOBACCO NON-USER: CPT | Performed by: FAMILY MEDICINE

## 2019-01-17 PROCEDURE — G8400 PT W/DXA NO RESULTS DOC: HCPCS | Performed by: FAMILY MEDICINE

## 2019-01-17 PROCEDURE — 1090F PRES/ABSN URINE INCON ASSESS: CPT | Performed by: FAMILY MEDICINE

## 2019-01-17 PROCEDURE — 1101F PT FALLS ASSESS-DOCD LE1/YR: CPT | Performed by: FAMILY MEDICINE

## 2019-01-17 PROCEDURE — 4040F PNEUMOC VAC/ADMIN/RCVD: CPT | Performed by: FAMILY MEDICINE

## 2019-01-17 PROCEDURE — 2022F DILAT RTA XM EVC RTNOPTHY: CPT | Performed by: FAMILY MEDICINE

## 2019-01-17 PROCEDURE — 3046F HEMOGLOBIN A1C LEVEL >9.0%: CPT | Performed by: FAMILY MEDICINE

## 2019-01-17 RX ORDER — ATENOLOL 50 MG/1
50 TABLET ORAL DAILY
Qty: 90 TABLET | Refills: 1 | Status: SHIPPED | OUTPATIENT
Start: 2019-01-17 | End: 2019-07-15 | Stop reason: SDUPTHER

## 2019-01-17 RX ORDER — ALPRAZOLAM 0.5 MG/1
0.5 TABLET ORAL 2 TIMES DAILY PRN
COMMUNITY
End: 2020-08-05

## 2019-01-17 RX ORDER — CELECOXIB 200 MG/1
200 CAPSULE ORAL 2 TIMES DAILY
Qty: 180 CAPSULE | Refills: 1 | Status: SHIPPED | OUTPATIENT
Start: 2019-01-17 | End: 2019-07-15 | Stop reason: SDUPTHER

## 2019-01-17 ASSESSMENT — ENCOUNTER SYMPTOMS
ABDOMINAL PAIN: 0
GASTROINTESTINAL NEGATIVE: 1
RESPIRATORY NEGATIVE: 1
BACK PAIN: 1

## 2019-04-05 DIAGNOSIS — F51.01 PRIMARY INSOMNIA: ICD-10-CM

## 2019-04-05 DIAGNOSIS — M19.90 ARTHRITIS: Chronic | ICD-10-CM

## 2019-04-05 DIAGNOSIS — I47.1 SVT (SUPRAVENTRICULAR TACHYCARDIA) (HCC): Chronic | ICD-10-CM

## 2019-04-05 RX ORDER — TRAMADOL HYDROCHLORIDE 50 MG/1
50 TABLET ORAL EVERY 6 HOURS PRN
Qty: 100 TABLET | Refills: 0 | OUTPATIENT
Start: 2019-04-05 | End: 2019-05-05

## 2019-04-05 RX ORDER — ALPRAZOLAM 0.5 MG/1
TABLET ORAL
Qty: 60 TABLET | Refills: 1 | Status: SHIPPED | OUTPATIENT
Start: 2019-04-05 | End: 2019-07-15 | Stop reason: SDUPTHER

## 2019-04-05 NOTE — TELEPHONE ENCOUNTER
Controlled Substances Monitoring:     RX Monitoring 4/5/2019   Attestation The Prescription Monitoring Report for this patient was reviewed today. Chronic Pain Routine Monitoring Possible medication side effects, risk of tolerance/dependence & alternative treatments discussed. Chronic Pain > [de-identified] MEDD -         Karyn has recently gotten other pain meds. I can fill the Xanax, but not the Tramadol.

## 2019-04-05 NOTE — TELEPHONE ENCOUNTER
Bill Harden called requesting a refill on the following medications:  Requested Prescriptions     Pending Prescriptions Disp Refills    ALPRAZolam (XANAX) 0.5 MG tablet [Pharmacy Med Name: ALPRAZOLAM 0.5 MG TABLET] 60 tablet 0     Sig: Take 1 tablet by mouth 2 times daily as needed for Sleep or Anxiety    traMADol (ULTRAM) 50 MG tablet [Pharmacy Med Name: TRAMADOL HCL 50 MG TABLET] 100 tablet 0     Sig: Take 1 tablet by mouth every 6 hours as needed for Pain for up to 30 days.        Date of last visit: 1/17/2019  Date of next visit (if applicable):7/15/2019  Date of last refill: Xanax 09/04/18 #60 x 2          Tramadol 01/07/19 #100 x NR  Pharmacy Name: Oxana Parry [facility] ([address] [place] [address])

## 2019-04-09 DIAGNOSIS — M19.90 ARTHRITIS: Chronic | ICD-10-CM

## 2019-04-09 RX ORDER — TRAMADOL HYDROCHLORIDE 50 MG/1
50 TABLET ORAL EVERY 6 HOURS PRN
Qty: 100 TABLET | Refills: 0 | Status: SHIPPED | OUTPATIENT
Start: 2019-04-09 | End: 2019-04-12 | Stop reason: SDUPTHER

## 2019-04-09 NOTE — TELEPHONE ENCOUNTER
Jas Green called requesting a refill on the following medications:  Requested Prescriptions     Pending Prescriptions Disp Refills    traMADol (ULTRAM) 50 MG tablet 100 tablet 0     Sig: Take 1 tablet by mouth every 6 hours as needed for Pain for up to 30 days. MEDICATION WAS DENIED DUE TO PATIENT BEING ON OTHER PAIN MEDICATIONS. PATIENT STATES THAT SHE WAS ON OTHER MEDICATION DUE TO SURGERY BUT IS NO LONGER ON THEM. WANTING TO KNOW IF DR. Francis Treviño WILL REFILL TRAMADOL.  PLEASE ADVISE      Date of last visit: 1/17/2019  Date of next visit (if applicable):7/15/2019  Date of last refill: 01/07/19 #100 x NR  Pharmacy Name: Muna Mendes,  Darrick Floyd CMA ([facility])

## 2019-04-09 NOTE — TELEPHONE ENCOUNTER
Surgery recovery and now back on Tramadol      Controlled Substances Monitoring:     RX Monitoring 4/9/2019   Attestation The Prescription Monitoring Report for this patient was reviewed today. Chronic Pain Routine Monitoring Possible medication side effects, risk of tolerance/dependence & alternative treatments discussed. ;Obtaining appropriate analgesic effect of treatment.    Chronic Pain > 80 MEDD -

## 2019-04-12 DIAGNOSIS — M19.90 ARTHRITIS: Chronic | ICD-10-CM

## 2019-04-12 RX ORDER — TRAMADOL HYDROCHLORIDE 50 MG/1
50 TABLET ORAL EVERY 6 HOURS PRN
Qty: 100 TABLET | Refills: 0 | Status: SHIPPED | OUTPATIENT
Start: 2019-04-12 | End: 2019-05-30 | Stop reason: SDUPTHER

## 2019-04-12 NOTE — TELEPHONE ENCOUNTER
Gilles Sanchez called requesting a refill on the following medications:  Requested Prescriptions     Pending Prescriptions Disp Refills    traMADol (ULTRAM) 50 MG tablet 100 tablet 0     Sig: Take 1 tablet by mouth every 6 hours as needed for Pain for up to 30 days. Date of last visit: 1/17/2019  Date of next visit (if applicable):Visit date not found  Date of last refill:   Pharmacy Name: Chandrakant Martinez LPN  Lenora Maryland called and wanted to know if her Tramadol was renewed, in looking into it I see the script was sent to [facility] which she no longer uses. She now uses [facility]. I have it set up for you to approve and I will call [facility]

## 2019-05-30 DIAGNOSIS — M19.90 ARTHRITIS: Chronic | ICD-10-CM

## 2019-05-30 RX ORDER — TRAMADOL HYDROCHLORIDE 50 MG/1
50 TABLET ORAL EVERY 6 HOURS PRN
Qty: 100 TABLET | Refills: 1 | Status: SHIPPED | OUTPATIENT
Start: 2019-05-30 | End: 2019-09-05 | Stop reason: SDUPTHER

## 2019-05-30 NOTE — TELEPHONE ENCOUNTER
Controlled Substances Monitoring:     RX Monitoring 5/30/2019   Attestation The Prescription Monitoring Report for this patient was reviewed today. Chronic Pain Routine Monitoring Possible medication side effects, risk of tolerance/dependence & alternative treatments discussed. ;Obtaining appropriate analgesic effect of treatment.    Chronic Pain > 80 MEDD -

## 2019-05-30 NOTE — TELEPHONE ENCOUNTER
Leon Bazan called requesting a refill on the following medications:  Requested Prescriptions     Pending Prescriptions Disp Refills    traMADol (ULTRAM) 50 MG tablet [Pharmacy Med Name: TRAMADOL HCL 50 MG TABLET] 100 tablet 0     Sig: Take 1 tablet by mouth every 6 hours as needed for Pain for up to 30 days.        Date of last visit: 1/17/2019  Date of next visit (if applicable):7/15/2019  Date of last refill: 04/12/2019  [facility] [address] [place] [address]

## 2019-07-15 ENCOUNTER — OFFICE VISIT (OUTPATIENT)
Dept: FAMILY MEDICINE CLINIC | Age: 69
End: 2019-07-15
Payer: MEDICARE

## 2019-07-15 VITALS
HEIGHT: 67 IN | SYSTOLIC BLOOD PRESSURE: 132 MMHG | HEART RATE: 68 BPM | DIASTOLIC BLOOD PRESSURE: 88 MMHG | WEIGHT: 187.6 LBS | BODY MASS INDEX: 29.44 KG/M2

## 2019-07-15 DIAGNOSIS — F51.01 PRIMARY INSOMNIA: ICD-10-CM

## 2019-07-15 DIAGNOSIS — I47.1 SVT (SUPRAVENTRICULAR TACHYCARDIA) (HCC): Chronic | ICD-10-CM

## 2019-07-15 DIAGNOSIS — E11.9 TYPE 2 DIABETES MELLITUS WITHOUT COMPLICATION, WITHOUT LONG-TERM CURRENT USE OF INSULIN (HCC): Primary | Chronic | ICD-10-CM

## 2019-07-15 DIAGNOSIS — M19.90 ARTHRITIS: Chronic | ICD-10-CM

## 2019-07-15 DIAGNOSIS — Z12.11 SPECIAL SCREENING FOR MALIGNANT NEOPLASMS, COLON: ICD-10-CM

## 2019-07-15 PROCEDURE — 1036F TOBACCO NON-USER: CPT | Performed by: FAMILY MEDICINE

## 2019-07-15 PROCEDURE — G8427 DOCREV CUR MEDS BY ELIG CLIN: HCPCS | Performed by: FAMILY MEDICINE

## 2019-07-15 PROCEDURE — G8400 PT W/DXA NO RESULTS DOC: HCPCS | Performed by: FAMILY MEDICINE

## 2019-07-15 PROCEDURE — 2022F DILAT RTA XM EVC RTNOPTHY: CPT | Performed by: FAMILY MEDICINE

## 2019-07-15 PROCEDURE — 4040F PNEUMOC VAC/ADMIN/RCVD: CPT | Performed by: FAMILY MEDICINE

## 2019-07-15 PROCEDURE — 1090F PRES/ABSN URINE INCON ASSESS: CPT | Performed by: FAMILY MEDICINE

## 2019-07-15 PROCEDURE — 3017F COLORECTAL CA SCREEN DOC REV: CPT | Performed by: FAMILY MEDICINE

## 2019-07-15 PROCEDURE — 3046F HEMOGLOBIN A1C LEVEL >9.0%: CPT | Performed by: FAMILY MEDICINE

## 2019-07-15 PROCEDURE — 3288F FALL RISK ASSESSMENT DOCD: CPT | Performed by: FAMILY MEDICINE

## 2019-07-15 PROCEDURE — G8510 SCR DEP NEG, NO PLAN REQD: HCPCS | Performed by: FAMILY MEDICINE

## 2019-07-15 PROCEDURE — G8419 CALC BMI OUT NRM PARAM NOF/U: HCPCS | Performed by: FAMILY MEDICINE

## 2019-07-15 PROCEDURE — 1123F ACP DISCUSS/DSCN MKR DOCD: CPT | Performed by: FAMILY MEDICINE

## 2019-07-15 PROCEDURE — 99213 OFFICE O/P EST LOW 20 MIN: CPT | Performed by: FAMILY MEDICINE

## 2019-07-15 RX ORDER — CELECOXIB 200 MG/1
200 CAPSULE ORAL 2 TIMES DAILY
Qty: 180 CAPSULE | Refills: 1 | Status: SHIPPED | OUTPATIENT
Start: 2019-07-15 | End: 2020-01-13 | Stop reason: SDUPTHER

## 2019-07-15 RX ORDER — ALPRAZOLAM 0.5 MG/1
0.5 TABLET ORAL 2 TIMES DAILY PRN
Qty: 60 TABLET | Refills: 1 | Status: SHIPPED | OUTPATIENT
Start: 2019-07-15 | End: 2019-12-05 | Stop reason: SDUPTHER

## 2019-07-15 RX ORDER — ATENOLOL 50 MG/1
50 TABLET ORAL DAILY
Qty: 90 TABLET | Refills: 1 | Status: SHIPPED | OUTPATIENT
Start: 2019-07-15 | End: 2020-01-13 | Stop reason: SDUPTHER

## 2019-07-15 ASSESSMENT — ENCOUNTER SYMPTOMS
GASTROINTESTINAL NEGATIVE: 1
RESPIRATORY NEGATIVE: 1

## 2019-07-15 ASSESSMENT — PATIENT HEALTH QUESTIONNAIRE - PHQ9
2. FEELING DOWN, DEPRESSED OR HOPELESS: 1
SUM OF ALL RESPONSES TO PHQ QUESTIONS 1-9: 1
1. LITTLE INTEREST OR PLEASURE IN DOING THINGS: 0
SUM OF ALL RESPONSES TO PHQ QUESTIONS 1-9: 1
SUM OF ALL RESPONSES TO PHQ9 QUESTIONS 1 & 2: 1

## 2019-07-15 NOTE — PROGRESS NOTES
Procedures    Comprehensive Metabolic Panel     Standing Status:   Future     Standing Expiration Date:   7/14/2020    Lipid Panel     Standing Status:   Future     Standing Expiration Date:   7/14/2020     Order Specific Question:   Is Patient Fasting?/# of Hours     Answer:   12 hours    Hemoglobin A1C     Standing Status:   Future     Standing Expiration Date:   7/14/2020    POCT Fecal Immunochemical Test (FIT)     Standing Status:   Future     Standing Expiration Date:   8/15/2019     Medications Prescribed:  Orders Placed This Encounter   Medications    atenolol (TENORMIN) 50 MG tablet     Sig: Take 1 tablet by mouth daily     Dispense:  90 tablet     Refill:  1    celecoxib (CELEBREX) 200 MG capsule     Sig: Take 1 capsule by mouth 2 times daily     Dispense:  180 capsule     Refill:  1    ALPRAZolam (XANAX) 0.5 MG tablet     Sig: Take 1 tablet by mouth 2 times daily as needed for Sleep for up to 30 days.      Dispense:  60 tablet     Refill:  1         Electronically signed by Diamante Barrios 7/15/2019 at 9:29 AM

## 2019-07-25 ENCOUNTER — NURSE ONLY (OUTPATIENT)
Dept: FAMILY MEDICINE CLINIC | Age: 69
End: 2019-07-25
Payer: MEDICARE

## 2019-07-25 ENCOUNTER — TELEPHONE (OUTPATIENT)
Dept: FAMILY MEDICINE CLINIC | Age: 69
End: 2019-07-25

## 2019-07-25 DIAGNOSIS — E11.9 TYPE 2 DIABETES MELLITUS WITHOUT COMPLICATION, WITHOUT LONG-TERM CURRENT USE OF INSULIN (HCC): Chronic | ICD-10-CM

## 2019-07-25 DIAGNOSIS — Z12.11 SPECIAL SCREENING FOR MALIGNANT NEOPLASMS, COLON: ICD-10-CM

## 2019-07-25 LAB
ALBUMIN SERPL-MCNC: 4.3 G/DL (ref 3.5–5.1)
ALP BLD-CCNC: 112 U/L (ref 38–126)
ALT SERPL-CCNC: 17 U/L (ref 11–66)
ANION GAP SERPL CALCULATED.3IONS-SCNC: 9 MEQ/L (ref 8–16)
AST SERPL-CCNC: 22 U/L (ref 5–40)
AVERAGE GLUCOSE: 132 MG/DL (ref 70–126)
BILIRUB SERPL-MCNC: 0.3 MG/DL (ref 0.3–1.2)
BUN BLDV-MCNC: 19 MG/DL (ref 7–22)
CALCIUM SERPL-MCNC: 9.3 MG/DL (ref 8.5–10.5)
CHLORIDE BLD-SCNC: 104 MEQ/L (ref 98–111)
CHOLESTEROL, TOTAL: 212 MG/DL (ref 100–199)
CO2: 27 MEQ/L (ref 23–33)
CONTROL: PRESENT
CREAT SERPL-MCNC: 0.7 MG/DL (ref 0.4–1.2)
GFR SERPL CREATININE-BSD FRML MDRD: 83 ML/MIN/1.73M2
GLUCOSE BLD-MCNC: 144 MG/DL (ref 70–108)
HBA1C MFR BLD: 6.4 % (ref 4.4–6.4)
HDLC SERPL-MCNC: 48 MG/DL
HEMOCCULT STL QL: NORMAL
LDL CHOLESTEROL CALCULATED: 145 MG/DL
POTASSIUM SERPL-SCNC: 5 MEQ/L (ref 3.5–5.2)
SODIUM BLD-SCNC: 140 MEQ/L (ref 135–145)
TOTAL PROTEIN: 6.8 G/DL (ref 6.1–8)
TRIGL SERPL-MCNC: 96 MG/DL (ref 0–199)

## 2019-07-25 PROCEDURE — 36415 COLL VENOUS BLD VENIPUNCTURE: CPT | Performed by: FAMILY MEDICINE

## 2019-07-25 PROCEDURE — 82274 ASSAY TEST FOR BLOOD FECAL: CPT | Performed by: FAMILY MEDICINE

## 2019-09-05 DIAGNOSIS — M19.90 ARTHRITIS: Chronic | ICD-10-CM

## 2019-09-05 RX ORDER — TRAMADOL HYDROCHLORIDE 50 MG/1
50 TABLET ORAL EVERY 6 HOURS PRN
Qty: 100 TABLET | Refills: 0 | Status: SHIPPED | OUTPATIENT
Start: 2019-09-05 | End: 2020-01-13 | Stop reason: SDUPTHER

## 2019-09-05 NOTE — TELEPHONE ENCOUNTER
Jayme Hammond called requesting a refill on the following medications:  Requested Prescriptions     Pending Prescriptions Disp Refills    traMADol (ULTRAM) 50 MG tablet [Pharmacy Med Name: TRAMADOL HCL 50 MG TABLET] 100 tablet 0     Sig: Take 1 tablet by mouth every 6 hours as needed for Pain for up to 30 days.        Date of last visit: 7/15/2019  Date of next visit (if applicable):1/13/2020  Date of last refill: 05/30/2019  Pharmacy Name: Tonny Leyden Thanks, Orson Grana, [address] [place] [address]

## 2019-10-28 ENCOUNTER — HOSPITAL ENCOUNTER (OUTPATIENT)
Dept: MAMMOGRAPHY | Age: 69
Discharge: HOME | End: 2019-10-28
Payer: MEDICARE

## 2019-10-28 DIAGNOSIS — Z12.39 BREAST CANCER SCREENING: ICD-10-CM

## 2019-10-28 PROCEDURE — 77063 BREAST TOMOSYNTHESIS BI: CPT

## 2020-01-13 ENCOUNTER — OFFICE VISIT (OUTPATIENT)
Dept: FAMILY MEDICINE CLINIC | Age: 70
End: 2020-01-13
Payer: MEDICARE

## 2020-01-13 VITALS
SYSTOLIC BLOOD PRESSURE: 134 MMHG | HEIGHT: 68 IN | BODY MASS INDEX: 28.7 KG/M2 | WEIGHT: 189.4 LBS | DIASTOLIC BLOOD PRESSURE: 80 MMHG | HEART RATE: 76 BPM

## 2020-01-13 LAB — HBA1C MFR BLD: 6.3 %

## 2020-01-13 PROCEDURE — G0009 ADMIN PNEUMOCOCCAL VACCINE: HCPCS | Performed by: FAMILY MEDICINE

## 2020-01-13 PROCEDURE — G0008 ADMIN INFLUENZA VIRUS VAC: HCPCS | Performed by: FAMILY MEDICINE

## 2020-01-13 PROCEDURE — 83036 HEMOGLOBIN GLYCOSYLATED A1C: CPT | Performed by: FAMILY MEDICINE

## 2020-01-13 PROCEDURE — 3017F COLORECTAL CA SCREEN DOC REV: CPT | Performed by: FAMILY MEDICINE

## 2020-01-13 PROCEDURE — 1123F ACP DISCUSS/DSCN MKR DOCD: CPT | Performed by: FAMILY MEDICINE

## 2020-01-13 PROCEDURE — 3044F HG A1C LEVEL LT 7.0%: CPT | Performed by: FAMILY MEDICINE

## 2020-01-13 PROCEDURE — 90686 IIV4 VACC NO PRSV 0.5 ML IM: CPT | Performed by: FAMILY MEDICINE

## 2020-01-13 PROCEDURE — G0438 PPPS, INITIAL VISIT: HCPCS | Performed by: FAMILY MEDICINE

## 2020-01-13 PROCEDURE — 4040F PNEUMOC VAC/ADMIN/RCVD: CPT | Performed by: FAMILY MEDICINE

## 2020-01-13 PROCEDURE — 90732 PPSV23 VACC 2 YRS+ SUBQ/IM: CPT | Performed by: FAMILY MEDICINE

## 2020-01-13 PROCEDURE — G8482 FLU IMMUNIZE ORDER/ADMIN: HCPCS | Performed by: FAMILY MEDICINE

## 2020-01-13 RX ORDER — TRAMADOL HYDROCHLORIDE 50 MG/1
50 TABLET ORAL EVERY 6 HOURS PRN
Qty: 100 TABLET | Refills: 1 | Status: SHIPPED | OUTPATIENT
Start: 2020-01-13 | End: 2020-08-31 | Stop reason: SDUPTHER

## 2020-01-13 RX ORDER — ALPRAZOLAM 0.5 MG/1
TABLET ORAL
Qty: 60 TABLET | Refills: 1 | Status: SHIPPED | OUTPATIENT
Start: 2020-01-13 | End: 2020-06-01 | Stop reason: SDUPTHER

## 2020-01-13 RX ORDER — ATENOLOL 50 MG/1
50 TABLET ORAL DAILY
Qty: 90 TABLET | Refills: 3 | Status: SHIPPED | OUTPATIENT
Start: 2020-01-13 | End: 2020-08-31 | Stop reason: SDUPTHER

## 2020-01-13 RX ORDER — CELECOXIB 200 MG/1
200 CAPSULE ORAL 2 TIMES DAILY
Qty: 180 CAPSULE | Refills: 3 | Status: SHIPPED | OUTPATIENT
Start: 2020-01-13 | End: 2021-01-15 | Stop reason: SDUPTHER

## 2020-01-13 ASSESSMENT — ENCOUNTER SYMPTOMS
ABDOMINAL PAIN: 0
RESPIRATORY NEGATIVE: 1
GASTROINTESTINAL NEGATIVE: 1
SHORTNESS OF BREATH: 0

## 2020-01-13 ASSESSMENT — LIFESTYLE VARIABLES: HOW OFTEN DO YOU HAVE A DRINK CONTAINING ALCOHOL: 0

## 2020-01-13 ASSESSMENT — PATIENT HEALTH QUESTIONNAIRE - PHQ9
SUM OF ALL RESPONSES TO PHQ QUESTIONS 1-9: 1
SUM OF ALL RESPONSES TO PHQ QUESTIONS 1-9: 1

## 2020-01-13 NOTE — PROGRESS NOTES
[facility]  [address] [facility]  Dept: [phone]  Dept Fax: [phone]: [phone]    Visit Date: 1/13/2020    Lottie Golden is a 77 y. o.female who presents today for:   Chief Complaint   Patient presents with    Medicare AWV    6 Month Follow-Up         HPI:      Arthritis pain and loss of ROM. Active. Fair ADL's. Tramadol helpful. Redy to start vaccinations. SVT controlled and no symptoms. No syncope. Sugars well controlled with exercise and diet control. Current Outpatient Medications   Medication Sig Dispense Refill    traMADol (ULTRAM) 50 MG tablet Take 1 tablet by mouth every 6 hours as needed for Pain for up to 30 days. 100 tablet 1    ALPRAZolam (XANAX) 0.5 MG tablet Take 1 tablet by mouth 2 times daily as needed for Sleep or anxiety for up to 30days. 60 tablet 1    atenolol (TENORMIN) 50 MG tablet Take 1 tablet by mouth daily 90 tablet 3    celecoxib (CELEBREX) 200 MG capsule Take 1 capsule by mouth 2 times daily 180 capsule 3    ALPRAZolam (XANAX) 0.5 MG tablet Take 0.5 mg by mouth 2 times daily as needed for Sleep. Mauricio Crate therapeutic multivitamin-minerals (THERAGRAN-M) tablet Take 1 tablet by mouth daily. No current facility-administered medications for this visit. The patient has No Known Allergies. Subjective:      Review of Systems   Constitutional: Negative. Negative for activity change and appetite change. HENT: Negative. Respiratory: Negative. Negative for shortness of breath. Cardiovascular: Negative. Negative for chest pain. Gastrointestinal: Negative. Negative for abdominal pain. Genitourinary: Negative. Musculoskeletal: Positive for arthralgias and gait problem. Skin: Negative. Neurological: Negative for seizures and syncope. Hematological: Negative. Psychiatric/Behavioral: Positive for sleep disturbance.  Negative for dysphoric mood. Objective:     /80 (Site: Left Upper Arm, Position: Sitting, Cuff Size: Large Adult)   Pulse 76   Ht 5' 7.5\" (1.715 m)   Wt 189 lb 6.4 oz (85.9 kg)   BMI 29.23 kg/m²     Physical Exam  Vitals signs reviewed. Constitutional:       Appearance: She is well-developed and normal weight. Neck:      Musculoskeletal: Normal range of motion and neck supple. Thyroid: No thyromegaly. Cardiovascular:      Rate and Rhythm: Normal rate and regular rhythm. Heart sounds: Normal heart sounds. No murmur. No friction rub. No gallop. Pulmonary:      Effort: Pulmonary effort is normal.      Breath sounds: Normal breath sounds. Abdominal:      Palpations: Abdomen is soft. There is no hepatomegaly, splenomegaly or mass. Tenderness: There is no tenderness. Comments:      Musculoskeletal:         General: No tenderness. Lymphadenopathy:      Cervical: No cervical adenopathy. Skin:     General: Skin is warm and dry. Findings: No rash. Neurological:      Mental Status: She is alert and oriented to person, place, and time. Comments: Ambulatory. No tremors. Assessment:       Diagnosis Orders   1. Type 2 diabetes mellitus without complication, without long-term current use of insulin (HCC)  POCT glycosylated hemoglobin (Hb A1C)   2. Need for prophylactic vaccination against Streptococcus pneumoniae (pneumococcus)  Pneumococcal polysaccharide vaccine 23-valent greater than or equal to 1yo subcutaneous/IM   3. Need for prophylactic vaccination and inoculation against influenza  INFLUENZA, QUADV, 3 YRS AND OLDER, IM PF, PREFILL SYR OR SDV, 0.5ML (AFLURIA QUADV, PF)   4. Arthritis  traMADol (ULTRAM) 50 MG tablet    celecoxib (CELEBREX) 200 MG capsule   5. SVT (supraventricular tachycardia) (HCC)  ALPRAZolam (XANAX) 0.5 MG tablet    atenolol (TENORMIN) 50 MG tablet   6.  Primary insomnia  ALPRAZolam (XANAX) 0.5 MG tablet       Plan:      Controlled Substance

## 2020-06-01 RX ORDER — ALPRAZOLAM 0.5 MG/1
TABLET ORAL
Qty: 60 TABLET | Refills: 0 | Status: SHIPPED | OUTPATIENT
Start: 2020-06-01 | End: 2020-08-05 | Stop reason: SDUPTHER

## 2020-08-05 RX ORDER — ALPRAZOLAM 0.5 MG/1
TABLET ORAL
Qty: 60 TABLET | Refills: 0 | Status: SHIPPED | OUTPATIENT
Start: 2020-08-05 | End: 2020-10-06 | Stop reason: SDUPTHER

## 2020-08-05 NOTE — TELEPHONE ENCOUNTER
Johann Friedman called requesting a refill on the following medications:  Requested Prescriptions     Pending Prescriptions Disp Refills    ALPRAZolam (XANAX) 0.5 MG tablet 60 tablet 0     Sig: Take 1 tablet by mouth 2 times daily as needed for Sleep or anxiety for up to 30days.        Date of last visit: 6/1/2020  Date of next visit (if applicable):Visit date not found  Date of last refill: 07/01/2020  Pharmacy Name: Reginald Martinez LPN

## 2020-08-31 ENCOUNTER — OFFICE VISIT (OUTPATIENT)
Dept: FAMILY MEDICINE CLINIC | Age: 70
End: 2020-08-31
Payer: MEDICARE

## 2020-08-31 VITALS
SYSTOLIC BLOOD PRESSURE: 150 MMHG | WEIGHT: 187.6 LBS | DIASTOLIC BLOOD PRESSURE: 82 MMHG | BODY MASS INDEX: 28.43 KG/M2 | HEIGHT: 68 IN | HEART RATE: 64 BPM | TEMPERATURE: 97 F

## 2020-08-31 PROBLEM — L97.521 DIABETIC ULCER OF TOE OF LEFT FOOT ASSOCIATED WITH TYPE 2 DIABETES MELLITUS, LIMITED TO BREAKDOWN OF SKIN (HCC): Status: ACTIVE | Noted: 2020-08-31

## 2020-08-31 PROBLEM — I10 ESSENTIAL HYPERTENSION: Status: ACTIVE | Noted: 2020-08-31

## 2020-08-31 PROBLEM — E11.621 DIABETIC ULCER OF TOE OF LEFT FOOT ASSOCIATED WITH TYPE 2 DIABETES MELLITUS, LIMITED TO BREAKDOWN OF SKIN (HCC): Status: ACTIVE | Noted: 2020-08-31

## 2020-08-31 PROBLEM — L84 PRE-ULCERATIVE CALLUSES: Status: ACTIVE | Noted: 2020-08-31

## 2020-08-31 PROCEDURE — 4040F PNEUMOC VAC/ADMIN/RCVD: CPT | Performed by: FAMILY MEDICINE

## 2020-08-31 PROCEDURE — 1036F TOBACCO NON-USER: CPT | Performed by: FAMILY MEDICINE

## 2020-08-31 PROCEDURE — 1090F PRES/ABSN URINE INCON ASSESS: CPT | Performed by: FAMILY MEDICINE

## 2020-08-31 PROCEDURE — 2022F DILAT RTA XM EVC RTNOPTHY: CPT | Performed by: FAMILY MEDICINE

## 2020-08-31 PROCEDURE — 3044F HG A1C LEVEL LT 7.0%: CPT | Performed by: FAMILY MEDICINE

## 2020-08-31 PROCEDURE — G8427 DOCREV CUR MEDS BY ELIG CLIN: HCPCS | Performed by: FAMILY MEDICINE

## 2020-08-31 PROCEDURE — G8400 PT W/DXA NO RESULTS DOC: HCPCS | Performed by: FAMILY MEDICINE

## 2020-08-31 PROCEDURE — 99214 OFFICE O/P EST MOD 30 MIN: CPT | Performed by: FAMILY MEDICINE

## 2020-08-31 PROCEDURE — 1123F ACP DISCUSS/DSCN MKR DOCD: CPT | Performed by: FAMILY MEDICINE

## 2020-08-31 PROCEDURE — 3017F COLORECTAL CA SCREEN DOC REV: CPT | Performed by: FAMILY MEDICINE

## 2020-08-31 PROCEDURE — G8417 CALC BMI ABV UP PARAM F/U: HCPCS | Performed by: FAMILY MEDICINE

## 2020-08-31 RX ORDER — CELECOXIB 200 MG/1
200 CAPSULE ORAL 2 TIMES DAILY
Qty: 180 CAPSULE | Refills: 3 | Status: CANCELLED | OUTPATIENT
Start: 2020-08-31 | End: 2021-02-26

## 2020-08-31 RX ORDER — TRAMADOL HYDROCHLORIDE 50 MG/1
50 TABLET ORAL EVERY 6 HOURS PRN
Qty: 100 TABLET | Refills: 1 | Status: SHIPPED | OUTPATIENT
Start: 2020-08-31 | End: 2020-12-07 | Stop reason: SDUPTHER

## 2020-08-31 RX ORDER — ATENOLOL 50 MG/1
50 TABLET ORAL DAILY
Qty: 90 TABLET | Refills: 3 | Status: SHIPPED | OUTPATIENT
Start: 2020-08-31 | End: 2021-10-14 | Stop reason: SDUPTHER

## 2020-08-31 ASSESSMENT — ENCOUNTER SYMPTOMS: SHORTNESS OF BREATH: 0

## 2020-08-31 NOTE — PATIENT INSTRUCTIONS
Please call podiatrist as soon as possible due to current left foot ulcer needing care. Recommend custom deep cushion diabetic shoes with custom inserts for her feet. Bunions/hammertoes. Neuropathy. Pre-ulcerative callous and callous with shallow ulcers on left foot. Recommend Tylenol 1000 mg (2 of 500 mg)    To protect your eyes from damage due to diabetes, please schedule an appointment with your ophthalmologist. Please let us know if you need help doing this. Patient Education         Sleep Problems: Getting Past Barriers to Powering Down (02:42)  Your health professional recommends that you watch this short online health video. Learn how to reduce technology use before bed for better sleep. How to watch the video    Scan the QR code   OR Visit the website    [email or web address]   Current as of: January 31, 2020               Content Version: 12.5  © 2006-2020 [employer]. Care instructions adapted under license by [employer] ([facility]). If you have questions about a medical condition or this instruction, always ask your healthcare professional. [employer] any warranty or liability for your use of this information. Patient Education         Sleep Problems: Make a Plan to Power Down (02:34)  Your health professional recommends that you watch this short online health video. Take steps to reduce your technology use before bed. How to watch the video    Scan the QR code   OR Visit the website    [email or web address]   Current as of: January 31, 2020               Content Version: 12.5  © 2006-2020 [employer]. Care instructions adapted under license by [employer] ([facility]). If you have questions about a medical condition or this instruction, always ask your healthcare professional. [employer] any warranty or liability for your use of this information.

## 2020-08-31 NOTE — PROGRESS NOTES
[address] [place] [address], [address]  Phone:  [phone]  BBX:[phone]       Name: Anupama Degroot  :     Chief Complaint   Patient presents with    Diabetes       HPI:     Anupama Degroot is a 72 y.o. female who presents today for     HPI    DM -- has been welll controlled to her knowledge. She denies hypoglycemia. Does not check her feet. Does have podiatry due to toenails being difficult to cut. HTN -- up today, high anxiety today. SVT -- on tenormin. Stable w/o flare up  OA hands and knees, moderate -- on celebrex doing okay but pain can get severe. Stays active as possible, walking outside currently. Given Tramadol 100 tablet/RF 1 on 2020. No tingling    Anxiety -- most of her adult life. Worsened with  with epilepsy. Xanax 0.5 mg used intermittently. Refilled 2020 with 60. Fear of something is going to go wrong. At night, can't sleep without it. Current Outpatient Medications:     atenolol (TENORMIN) 50 MG tablet, Take 1 tablet by mouth daily, Disp: 90 tablet, Rfl: 3    traMADol (ULTRAM) 50 MG tablet, Take 1 tablet by mouth every 6 hours as needed for Pain for up to 90 days. , Disp: 100 tablet, Rfl: 1    Handicap Placard MISC, by Does not apply route Chronic imbalance and pain due to severe osteoarthritis, Disp: 1 each, Rfl: 0    ALPRAZolam (XANAX) 0.5 MG tablet, Take 1 tablet by mouth 2 times daily as needed for Sleep or anxiety for up to 30days. , Disp: 60 tablet, Rfl: 0    celecoxib (CELEBREX) 200 MG capsule, Take 1 capsule by mouth 2 times daily, Disp: 180 capsule, Rfl: 3    therapeutic multivitamin-minerals (THERAGRAN-M) tablet, Take 1 tablet by mouth daily. , Disp: , Rfl:     No Known Allergies    Review of Systems   Constitutional: Negative for fatigue and fever. Respiratory: Negative for shortness of breath. Cardiovascular: Negative for chest pain and leg swelling.        Objective:     BP (!) 150/82 (Site: Left Upper Arm, Position: Sitting, Cuff Size: Medium Adult)   Pulse 64   Temp 97 °F (36.1 °C) (Temporal)   Ht 5' 8\" (1.727 m)   Wt 187 lb 9.6 oz (85.1 kg)   BMI 28.52 kg/m²     BP Readings from Last 10 Encounters:   08/31/20 (!) 150/82   01/13/20 134/80   07/15/19 132/88   01/17/19 136/88   07/19/18 138/80   01/19/18 136/80   06/27/17 (!) 144/84   01/05/17 112/78   10/07/16 140/86   08/18/16 130/86         Physical Exam  Constitutional:       General: She is not in acute distress. Appearance: Normal appearance. She is not ill-appearing. Cardiovascular:      Rate and Rhythm: Normal rate and regular rhythm. Heart sounds: No murmur. Pulmonary:      Effort: Pulmonary effort is normal. No respiratory distress. Breath sounds: Normal breath sounds. No wheezing. Musculoskeletal:         General: No swelling. Neurological:      Mental Status: She is alert.    Psychiatric:         Mood and Affect: Mood normal.     Visual inspection:  Deformity/amputation: bunion and hammertoe make alignment difficult on both feet but worse on left  Skin lesions/pre-ulcerative calluses: present - left foot toe ulcer noted with callus on another toe  Edema: right- negative, left- negative    Sensory exam:  Monofilament sensation: abnormal - absent in toes and then decreased in foot  (minimum of 5 random plantar locations tested, avoiding callused areas - > 1 area with absence of sensation is + for neuropathy)    Plus at least one of the following:  Pulses: normal,   Pinprick: Impaired  Proprioception: Absent  Vibration (128 Hz): Absent    Diabetes labs reviewed:  Lab Results   Component Value Date    LABA1C 6.3 01/13/2020     No results found for: EAG  Lab Results   Component Value Date    LABA1C 6.3 01/13/2020    LABA1C 6.4 07/25/2019    LABA1C 6.1 07/19/2018       Lab Results   Component Value Date     07/25/2019    K 5.0 07/25/2019     07/25/2019    CO2 27 07/25/2019    BUN 19 07/25/2019 CREATININE 0.7 07/25/2019    CALCIUM 9.3 07/25/2019    GLUCOSE 144 (H) 07/25/2019        Lab Results   Component Value Date    CHOL 212 (H) 07/25/2019    CHOL 204 (H) 02/01/2018    CHOL 163 07/18/2016     Lab Results   Component Value Date    TRIG 96 07/25/2019    TRIG 88 02/01/2018    TRIG 125 07/18/2016     Lab Results   Component Value Date    HDL 48 07/25/2019    HDL 65 02/01/2018    HDL 47 07/18/2016     Lab Results   Component Value Date    LDLCALC 145 07/25/2019    LDLCALC 121 02/01/2018    LDLCALC 91 07/18/2016     Lab Results   Component Value Date    LABVLDL 19 06/22/2015    VLDL 25 07/18/2016     Lab Results   Component Value Date    CHOLHDLRATIO 4.8 06/22/2015       No results found for: Anders Phelan    No results found for: TSH   No results found for: [id number]   No results found for: MG   Lab Results   Component Value Date    ALT 17 07/25/2019    AST 22 07/25/2019    ALKPHOS 112 07/25/2019    BILITOT 0.3 07/25/2019        Assessment/Plan:     Abril Sanchez was seen today for diabetes. Diagnoses and all orders for this visit:    Type 2 diabetes mellitus without complication, without long-term current use of insulin (HCC)  -     TSH without Reflex; Future  -     Vitamin B12; Future  -     Lipid Panel; Future  -     Microalbumin / Creatinine Urine Ratio; Future  -     Hemoglobin A1C; Future  -     Comprehensive Metabolic Panel; Future  -     CBC Auto Differential; Future  -     HM DIABETES FOOT EXAM  -     Handicap Placard MISC; by Does not apply route Chronic imbalance and pain due to severe osteoarthritis    SVT (supraventricular tachycardia) (HCC)  -     atenolol (TENORMIN) 50 MG tablet; Take 1 tablet by mouth daily  -     TSH without Reflex; Future  -     Vitamin B12; Future  -     Lipid Panel; Future  -     Microalbumin / Creatinine Urine Ratio; Future  -     Hemoglobin A1C; Future  -     Comprehensive Metabolic Panel;  Future  -     CBC Auto Differential; Future  -     Handicap Placard MISC; by Does not apply route Chronic imbalance and pain due to severe osteoarthritis    Arthritis  -     traMADol (ULTRAM) 50 MG tablet; Take 1 tablet by mouth every 6 hours as needed for Pain for up to 90 days.  -     Handicap Placard MISC; by Does not apply route Chronic imbalance and pain due to severe osteoarthritis    Elevated blood pressure reading    Diabetic polyneuropathy associated with type 2 diabetes mellitus (HCC)  -     TSH without Reflex; Future  -     Vitamin B12; Future  -     Microalbumin / Creatinine Urine Ratio; Future  -     Hemoglobin A1C; Future  -     Comprehensive Metabolic Panel; Future  -     CBC Auto Differential; Future  -     External Referral To Podiatry    Bunion  -     External Referral To Podiatry    [place], bilateral  -     External Referral To Podiatry    Diabetic ulcer of toe of left foot associated with type 2 diabetes mellitus, limited to breakdown of skin (HCC)  -     TSH without Reflex; Future  -     Vitamin B12; Future  -     Lipid Panel; Future  -     Microalbumin / Creatinine Urine Ratio; Future  -     Hemoglobin A1C; Future  -     Comprehensive Metabolic Panel; Future  -     CBC Auto Differential; Future  -     External Referral To Podiatry    Pre-ulcerative calluses  -     External Referral To Podiatry      Most concerning is her shallow ulcer noted on left foot. She will call her podiatrist to get in as soon as possible. She will call here if she can't get in. Discussed use of Tramadol and benzo  She will call for refill of xanax, enough till Jan visit. 1/2 tablet may be sufficient. Also use more Tylenol at higher doses than 500 mg. (650 or 1000 mg)  Encouraged trying to \"power down\" at night instead of xanax. Eye exam recommended. Recommend custom deep cushion diabetic shoes with custom inserts for her feet. Bunions/hammertoes. Neuropathy. Pre-ulcerative callous and callous with shallow ulcers on left foot.       Return in about 4 months (around 1/14/2021) for follow up/AWV. Future Appointments   Date Time Provider Michael Peacei   1/15/2021  9:00 AM Karishma Soares MD [facility] [place]          This office note has been dictated. Effort was made to review for errors but some may have been missed. Please contact Miley Aggarwal of note for clarification if needed.        Electronically signed by Karishma Soares MD on 9/5/2020 at 6:51 AM

## 2020-09-11 ENCOUNTER — HOSPITAL ENCOUNTER (OUTPATIENT)
Age: 70
Discharge: HOME | End: 2020-09-11
Payer: MEDICARE

## 2020-09-11 DIAGNOSIS — I47.1 SVT (SUPRAVENTRICULAR TACHYCARDIA) (HCC): Chronic | ICD-10-CM

## 2020-09-11 DIAGNOSIS — E11.42 DIABETIC POLYNEUROPATHY ASSOCIATED WITH TYPE 2 DIABETES MELLITUS (HCC): ICD-10-CM

## 2020-09-11 DIAGNOSIS — L97.521 DIABETIC ULCER OF TOE OF LEFT FOOT ASSOCIATED WITH TYPE 2 DIABETES MELLITUS, LIMITED TO BREAKDOWN OF SKIN (HCC): ICD-10-CM

## 2020-09-11 DIAGNOSIS — E11.621 DIABETIC ULCER OF TOE OF LEFT FOOT ASSOCIATED WITH TYPE 2 DIABETES MELLITUS, LIMITED TO BREAKDOWN OF SKIN (HCC): ICD-10-CM

## 2020-09-11 DIAGNOSIS — E11.9 TYPE 2 DIABETES MELLITUS WITHOUT COMPLICATION, WITHOUT LONG-TERM CURRENT USE OF INSULIN (HCC): Chronic | ICD-10-CM

## 2020-09-11 LAB
ALBUMIN SERPL-MCNC: 4.4 G/DL (ref 3.5–5.1)
ALP BLD-CCNC: 90 U/L (ref 38–126)
ALT SERPL-CCNC: 18 U/L (ref 11–66)
ANION GAP SERPL CALCULATED.3IONS-SCNC: 9 MEQ/L (ref 8–16)
AST SERPL-CCNC: 19 U/L (ref 5–40)
AVERAGE GLUCOSE: 132 MG/DL (ref 70–126)
BASOPHILS # BLD: 0.8 %
BASOPHILS ABSOLUTE: 0 THOU/MM3 (ref 0–0.1)
BILIRUB SERPL-MCNC: 0.2 MG/DL (ref 0.3–1.2)
BUN BLDV-MCNC: 21 MG/DL (ref 7–22)
CALCIUM SERPL-MCNC: 9.4 MG/DL (ref 8.5–10.5)
CHLORIDE BLD-SCNC: 104 MEQ/L (ref 98–111)
CHOLESTEROL, TOTAL: 231 MG/DL (ref 100–199)
CO2: 26 MEQ/L (ref 23–33)
CREAT SERPL-MCNC: 0.8 MG/DL (ref 0.4–1.2)
CREATININE, URINE: 76.1 MG/DL
EOSINOPHIL # BLD: 2.8 %
EOSINOPHILS ABSOLUTE: 0.1 THOU/MM3 (ref 0–0.4)
ERYTHROCYTE [DISTWIDTH] IN BLOOD BY AUTOMATED COUNT: 12.8 % (ref 11.5–14.5)
ERYTHROCYTE [DISTWIDTH] IN BLOOD BY AUTOMATED COUNT: 46 FL (ref 35–45)
GFR SERPL CREATININE-BSD FRML MDRD: 71 ML/MIN/1.73M2
GLUCOSE BLD-MCNC: 167 MG/DL (ref 70–108)
HBA1C MFR BLD: 6.4 % (ref 4.4–6.4)
HCT VFR BLD CALC: 42.9 % (ref 37–47)
HDLC SERPL-MCNC: 46 MG/DL
HEMOGLOBIN: 13.7 GM/DL (ref 12–16)
IMMATURE GRANS (ABS): 0 THOU/MM3 (ref 0–0.07)
IMMATURE GRANULOCYTES: 0 %
LDL CHOLESTEROL CALCULATED: 164 MG/DL
LYMPHOCYTES # BLD: 30.1 %
LYMPHOCYTES ABSOLUTE: 1.2 THOU/MM3 (ref 1–4.8)
MCH RBC QN AUTO: 31.3 PG (ref 26–33)
MCHC RBC AUTO-ENTMCNC: 31.9 GM/DL (ref 32.2–35.5)
MCV RBC AUTO: 97.9 FL (ref 81–99)
MICROALBUMIN UR-MCNC: < 1.2 MG/DL
MICROALBUMIN/CREAT UR-RTO: 16 MG/G (ref 0–30)
MONOCYTES # BLD: 6.7 %
MONOCYTES ABSOLUTE: 0.3 THOU/MM3 (ref 0.4–1.3)
NUCLEATED RED BLOOD CELLS: 0 /100 WBC
PLATELET # BLD: 199 THOU/MM3 (ref 130–400)
PMV BLD AUTO: 9.9 FL (ref 9.4–12.4)
POTASSIUM SERPL-SCNC: 5.2 MEQ/L (ref 3.5–5.2)
RBC # BLD: 4.38 MILL/MM3 (ref 4.2–5.4)
SEG NEUTROPHILS: 59.6 %
SEGMENTED NEUTROPHILS ABSOLUTE COUNT: 2.3 THOU/MM3 (ref 1.8–7.7)
SODIUM BLD-SCNC: 139 MEQ/L (ref 135–145)
TOTAL PROTEIN: 7 G/DL (ref 6.1–8)
TRIGL SERPL-MCNC: 103 MG/DL (ref 0–199)
TSH SERPL DL<=0.05 MIU/L-ACNC: 2.46 UIU/ML (ref 0.4–4.2)
VITAMIN B-12: 433 PG/ML (ref 211–911)
WBC # BLD: 3.9 THOU/MM3 (ref 4.8–10.8)

## 2020-09-11 PROCEDURE — 84443 ASSAY THYROID STIM HORMONE: CPT

## 2020-09-11 PROCEDURE — 82043 UR ALBUMIN QUANTITATIVE: CPT

## 2020-09-11 PROCEDURE — 82607 VITAMIN B-12: CPT

## 2020-09-11 PROCEDURE — 83036 HEMOGLOBIN GLYCOSYLATED A1C: CPT

## 2020-09-11 PROCEDURE — 36415 COLL VENOUS BLD VENIPUNCTURE: CPT

## 2020-09-11 PROCEDURE — 85025 COMPLETE CBC W/AUTO DIFF WBC: CPT

## 2020-09-11 PROCEDURE — 80061 LIPID PANEL: CPT

## 2020-09-11 PROCEDURE — 80053 COMPREHEN METABOLIC PANEL: CPT

## 2020-10-22 ENCOUNTER — NURSE ONLY (OUTPATIENT)
Dept: FAMILY MEDICINE CLINIC | Age: 70
End: 2020-10-22
Payer: MEDICARE

## 2020-10-22 PROCEDURE — G0008 ADMIN INFLUENZA VIRUS VAC: HCPCS | Performed by: FAMILY MEDICINE

## 2020-10-22 PROCEDURE — 90694 VACC AIIV4 NO PRSRV 0.5ML IM: CPT | Performed by: FAMILY MEDICINE

## 2020-10-22 NOTE — PROGRESS NOTES
After obtaining consent, and per orders of Titus Salvador MD, Immunization(s) given during visit:    Immunizations Administered     Name Date Dose Route    Influenza, Quadv, adjuvanted, 65 yrs +, IM, PF (Fluad) 10/22/2020 0.5 mL Intramuscular    Site: Deltoid- Left    Lot: [id number]    NDC: 11898-578-37

## 2020-12-02 RX ORDER — ALPRAZOLAM 0.5 MG/1
TABLET ORAL
Qty: 60 TABLET | Refills: 0 | OUTPATIENT
Start: 2020-12-02 | End: 2021-03-04

## 2020-12-02 NOTE — TELEPHONE ENCOUNTER
Boris Deal called requesting a refill on the following medications:  Requested Prescriptions     Pending Prescriptions Disp Refills    ALPRAZolam (XANAX) 0.5 MG tablet 60 tablet 0     Sig: Take HALF tablet by mouth 1-2 times daily as needed for anxiety not helped with other methods     Pharmacy verified: yes  . pv      Date of last visit:   Date of next visit (if applicable):01/05/2021

## 2020-12-03 ENCOUNTER — TELEPHONE (OUTPATIENT)
Dept: FAMILY MEDICINE CLINIC | Age: 70
End: 2020-12-03

## 2020-12-03 NOTE — TELEPHONE ENCOUNTER
Fausto Turner calls concerned she is just about out of Xanax before the allotted time given by Dr. Bonnie Marquez. Encouraged her to make an appointment, appointment made.

## 2020-12-04 RX ORDER — ALPRAZOLAM 0.5 MG/1
TABLET ORAL
Qty: 10 TABLET | Refills: 0 | Status: SHIPPED | OUTPATIENT
Start: 2020-12-04 | End: 2020-12-07 | Stop reason: SDUPTHER

## 2020-12-07 ENCOUNTER — OFFICE VISIT (OUTPATIENT)
Dept: FAMILY MEDICINE CLINIC | Age: 70
End: 2020-12-07
Payer: MEDICARE

## 2020-12-07 VITALS
WEIGHT: 183.2 LBS | SYSTOLIC BLOOD PRESSURE: 142 MMHG | TEMPERATURE: 97 F | DIASTOLIC BLOOD PRESSURE: 84 MMHG | BODY MASS INDEX: 28.75 KG/M2 | HEIGHT: 67 IN | HEART RATE: 68 BPM

## 2020-12-07 PROBLEM — E78.00 PURE HYPERCHOLESTEROLEMIA: Status: ACTIVE | Noted: 2020-12-07

## 2020-12-07 PROCEDURE — 1090F PRES/ABSN URINE INCON ASSESS: CPT | Performed by: FAMILY MEDICINE

## 2020-12-07 PROCEDURE — 4040F PNEUMOC VAC/ADMIN/RCVD: CPT | Performed by: FAMILY MEDICINE

## 2020-12-07 PROCEDURE — G8400 PT W/DXA NO RESULTS DOC: HCPCS | Performed by: FAMILY MEDICINE

## 2020-12-07 PROCEDURE — 99214 OFFICE O/P EST MOD 30 MIN: CPT | Performed by: FAMILY MEDICINE

## 2020-12-07 PROCEDURE — 1036F TOBACCO NON-USER: CPT | Performed by: FAMILY MEDICINE

## 2020-12-07 PROCEDURE — 1123F ACP DISCUSS/DSCN MKR DOCD: CPT | Performed by: FAMILY MEDICINE

## 2020-12-07 PROCEDURE — G8417 CALC BMI ABV UP PARAM F/U: HCPCS | Performed by: FAMILY MEDICINE

## 2020-12-07 PROCEDURE — G8427 DOCREV CUR MEDS BY ELIG CLIN: HCPCS | Performed by: FAMILY MEDICINE

## 2020-12-07 PROCEDURE — 3017F COLORECTAL CA SCREEN DOC REV: CPT | Performed by: FAMILY MEDICINE

## 2020-12-07 PROCEDURE — G8484 FLU IMMUNIZE NO ADMIN: HCPCS | Performed by: FAMILY MEDICINE

## 2020-12-07 RX ORDER — ALPRAZOLAM 0.5 MG/1
TABLET ORAL
Qty: 60 TABLET | Refills: 0 | Status: SHIPPED | OUTPATIENT
Start: 2020-12-07 | End: 2021-01-15

## 2020-12-07 RX ORDER — TRAZODONE HYDROCHLORIDE 50 MG/1
50 TABLET ORAL NIGHTLY PRN
Qty: 30 TABLET | Refills: 5 | Status: SHIPPED | OUTPATIENT
Start: 2020-12-07 | End: 2021-06-02

## 2020-12-07 RX ORDER — SIMVASTATIN 10 MG
10 TABLET ORAL NIGHTLY
Qty: 30 TABLET | Refills: 5 | Status: SHIPPED | OUTPATIENT
Start: 2020-12-07 | End: 2021-01-15 | Stop reason: SDUPTHER

## 2020-12-07 RX ORDER — TRAMADOL HYDROCHLORIDE 50 MG/1
50 TABLET ORAL EVERY 6 HOURS PRN
Qty: 100 TABLET | Refills: 1 | Status: SHIPPED | OUTPATIENT
Start: 2020-12-07 | End: 2021-03-22 | Stop reason: SDUPTHER

## 2020-12-07 NOTE — PATIENT INSTRUCTIONS
Staff -- repeat blood pressure    Recommend checking blood pressure outside of office to make sure controlled and protective of body organs.   Goal < 140/90

## 2020-12-07 NOTE — PROGRESS NOTES
[address] [place] [address] [address], [address], [place]  Phone:  [phone]  EEA:[phone]       Name: Nik Hair  : [id number]    Chief Complaint   Patient presents with    Anxiety    Diabetes    Medication Refill       HPI:     Nik Hair is a 72 y.o. female who presents today for     HPI    Anxiety -- chronic use of xanax, 20 years. Using 1-2 tablets during day. Acknowledged habit forming. Not wanting to increase dose. Missing walking in am.   Reading. COVID19 stress. Constantly worried --  with [address] now. Uses xanax at night to \"turn brain off\". Even with xanax, has TV/radio on. A lot of stress. Arthritis -- various joints. Hands have deformity. Tramadol uses -- 2 a day. celebrex 200 mg daily  Wakes up with pain    Pure hypercholesterolemia and SVT -- stable. Medication refills needed. Current Outpatient Medications:     traMADol (ULTRAM) 50 MG tablet, Take 1 tablet by mouth every 6 hours as needed for Pain for up to 90 days. , Disp: 100 tablet, Rfl: 1    traZODone (DESYREL) 50 MG tablet, Take 1 tablet by mouth nightly as needed for Sleep, Disp: 30 tablet, Rfl: 5    simvastatin (ZOCOR) 10 MG tablet, Take 1 tablet by mouth nightly, Disp: 30 tablet, Rfl: 5    ALPRAZolam (XANAX) 0.5 MG tablet, Take HALF tablet by mouth 1-2 times daily as needed for anxiety not helped with other methods, Disp: 60 tablet, Rfl: 0    atenolol (TENORMIN) 50 MG tablet, Take 1 tablet by mouth daily, Disp: 90 tablet, Rfl: 3    celecoxib (CELEBREX) 200 MG capsule, Take 1 capsule by mouth 2 times daily, Disp: 180 capsule, Rfl: 3    therapeutic multivitamin-minerals (THERAGRAN-M) tablet, Take 1 tablet by mouth daily. , Disp: , Rfl:     Handicap Placard MISC, by Does not apply route Chronic imbalance and pain due to severe osteoarthritis, Disp: 1 each, Rfl: 0    No Known Allergies    Review of Systems   Constitutional: Negative for fatigue and fever. Respiratory: Negative for shortness of breath. Cardiovascular: Negative for chest pain and leg swelling. Psychiatric/Behavioral: The patient is nervous/anxious. Objective:     BP (!) 142/84   Pulse 68   Temp 97 °F (36.1 °C) (Temporal)   Ht 5' 7\" (1.702 m)   Wt 183 lb 3.2 oz (83.1 kg)   BMI 28.69 kg/m²   BP Readings from Last 3 Encounters:   12/07/20 (!) 164/82   08/31/20 (!) 150/82   01/13/20 134/80       Physical Exam  Constitutional:       General: She is not in acute distress. Appearance: Normal appearance. She is not ill-appearing. Cardiovascular:      Rate and Rhythm: Normal rate and regular rhythm. Heart sounds: No murmur. Pulmonary:      Effort: Pulmonary effort is normal. No respiratory distress. Breath sounds: Normal breath sounds. No wheezing. Musculoskeletal:         General: No swelling. Neurological:      Mental Status: She is alert. Psychiatric:         Mood and Affect: Mood normal.       Lab Results   Component Value Date    CHOL 231 (H) 09/11/2020    CHOL 212 (H) 07/25/2019    CHOL 204 (H) 02/01/2018     Lab Results   Component Value Date    TRIG 103 09/11/2020    TRIG 96 07/25/2019    TRIG 88 02/01/2018     Lab Results   Component Value Date    HDL 46 09/11/2020    HDL 48 07/25/2019    HDL 65 02/01/2018     Lab Results   Component Value Date    LDLCALC 164 09/11/2020    LDLCALC 145 07/25/2019    LDLCALC 121 02/01/2018     Lab Results   Component Value Date    LABVLDL 19 06/22/2015    VLDL 25 07/18/2016     Lab Results   Component Value Date    CHOLHDLRATIO 4.8 06/22/2015         Assessment/Plan:     Sol Kapoor was seen today for anxiety, diabetes and medication refill. Diagnoses and all orders for this visit:    Essential hypertension    Arthritis  -     traMADol (ULTRAM) 50 MG tablet; Take 1 tablet by mouth every 6 hours as needed for Pain for up to 90 days.     Primary insomnia -     traZODone (DESYREL) 50 MG tablet; Take 1 tablet by mouth nightly as needed for Sleep  -     ALPRAZolam (XANAX) 0.5 MG tablet; Take HALF tablet by mouth 1-2 times daily as needed for anxiety not helped with other methods    Pure hypercholesterolemia  -     simvastatin (ZOCOR) 10 MG tablet; Take 1 tablet by mouth nightly    SVT (supraventricular tachycardia) (HCC)  -     ALPRAZolam (XANAX) 0.5 MG tablet; Take HALF tablet by mouth 1-2 times daily as needed for anxiety not helped with other methods    education given regarding use of xanax   Encouraged reduction of use of xanax  Start trazodone to address sleep issue   Sleep hygiene encouraged  Tramadol use with xanax discouraged, she will separate them by several hours when has to use them. Repeat blood pressure better but not to goal  She will check BP at home. Encouraged healthy activity and diet    Return for 1/15/2020. Future Appointments   Date Time Provider Michael Kim   1/15/2021  9:00 AM Miguel Angel Myers MD [facility] [place]          This office note has been dictated. Effort was made to review for errors but some may have been missed. Please contact Sanford Rich of note for clarification if needed.        Electronically signed by Miguel Angel Myers MD on 12/7/2020 at 10:59 AM

## 2020-12-12 ASSESSMENT — ENCOUNTER SYMPTOMS: SHORTNESS OF BREATH: 0

## 2021-01-08 ASSESSMENT — PATIENT HEALTH QUESTIONNAIRE - PHQ9
SUM OF ALL RESPONSES TO PHQ9 QUESTIONS 1 & 2: 1
1. LITTLE INTEREST OR PLEASURE IN DOING THINGS: 0
SUM OF ALL RESPONSES TO PHQ QUESTIONS 1-9: 1
2. FEELING DOWN, DEPRESSED OR HOPELESS: 1
SUM OF ALL RESPONSES TO PHQ QUESTIONS 1-9: 1

## 2021-01-08 ASSESSMENT — LIFESTYLE VARIABLES
AUDIT TOTAL SCORE: INCOMPLETE
AUDIT-C TOTAL SCORE: INCOMPLETE
HOW OFTEN DO YOU HAVE A DRINK CONTAINING ALCOHOL: NEVER

## 2021-01-15 ENCOUNTER — OFFICE VISIT (OUTPATIENT)
Dept: FAMILY MEDICINE CLINIC | Age: 71
End: 2021-01-15
Payer: MEDICARE

## 2021-01-15 VITALS
TEMPERATURE: 97.8 F | HEIGHT: 67 IN | OXYGEN SATURATION: 98 % | SYSTOLIC BLOOD PRESSURE: 146 MMHG | HEART RATE: 68 BPM | BODY MASS INDEX: 28.56 KG/M2 | WEIGHT: 182 LBS | DIASTOLIC BLOOD PRESSURE: 90 MMHG

## 2021-01-15 DIAGNOSIS — M19.90 ARTHRITIS: Chronic | ICD-10-CM

## 2021-01-15 DIAGNOSIS — I10 ESSENTIAL HYPERTENSION: ICD-10-CM

## 2021-01-15 DIAGNOSIS — E11.9 TYPE 2 DIABETES MELLITUS WITHOUT COMPLICATION, WITHOUT LONG-TERM CURRENT USE OF INSULIN (HCC): ICD-10-CM

## 2021-01-15 DIAGNOSIS — Z12.11 SCREEN FOR COLON CANCER: ICD-10-CM

## 2021-01-15 DIAGNOSIS — E11.42 DIABETIC POLYNEUROPATHY ASSOCIATED WITH TYPE 2 DIABETES MELLITUS (HCC): ICD-10-CM

## 2021-01-15 DIAGNOSIS — F51.01 PRIMARY INSOMNIA: ICD-10-CM

## 2021-01-15 DIAGNOSIS — Z78.0 POSTMENOPAUSAL: ICD-10-CM

## 2021-01-15 DIAGNOSIS — Z00.00 ROUTINE GENERAL MEDICAL EXAMINATION AT A HEALTH CARE FACILITY: Primary | ICD-10-CM

## 2021-01-15 DIAGNOSIS — E78.00 PURE HYPERCHOLESTEROLEMIA: ICD-10-CM

## 2021-01-15 DIAGNOSIS — I47.1 SVT (SUPRAVENTRICULAR TACHYCARDIA) (HCC): ICD-10-CM

## 2021-01-15 PROBLEM — E11.621 DIABETIC ULCER OF TOE OF LEFT FOOT ASSOCIATED WITH TYPE 2 DIABETES MELLITUS, LIMITED TO BREAKDOWN OF SKIN (HCC): Status: RESOLVED | Noted: 2020-08-31 | Resolved: 2021-01-15

## 2021-01-15 PROBLEM — L97.521 DIABETIC ULCER OF TOE OF LEFT FOOT ASSOCIATED WITH TYPE 2 DIABETES MELLITUS, LIMITED TO BREAKDOWN OF SKIN (HCC): Status: RESOLVED | Noted: 2020-08-31 | Resolved: 2021-01-15

## 2021-01-15 PROCEDURE — G8484 FLU IMMUNIZE NO ADMIN: HCPCS | Performed by: FAMILY MEDICINE

## 2021-01-15 PROCEDURE — 3046F HEMOGLOBIN A1C LEVEL >9.0%: CPT | Performed by: FAMILY MEDICINE

## 2021-01-15 PROCEDURE — 99213 OFFICE O/P EST LOW 20 MIN: CPT | Performed by: FAMILY MEDICINE

## 2021-01-15 PROCEDURE — 3017F COLORECTAL CA SCREEN DOC REV: CPT | Performed by: FAMILY MEDICINE

## 2021-01-15 PROCEDURE — 1123F ACP DISCUSS/DSCN MKR DOCD: CPT | Performed by: FAMILY MEDICINE

## 2021-01-15 PROCEDURE — 4040F PNEUMOC VAC/ADMIN/RCVD: CPT | Performed by: FAMILY MEDICINE

## 2021-01-15 PROCEDURE — G0439 PPPS, SUBSEQ VISIT: HCPCS | Performed by: FAMILY MEDICINE

## 2021-01-15 RX ORDER — LISINOPRIL 20 MG/1
20 TABLET ORAL NIGHTLY
Qty: 90 TABLET | Refills: 3 | Status: SHIPPED | OUTPATIENT
Start: 2021-01-15 | End: 2021-04-16

## 2021-01-15 RX ORDER — SIMVASTATIN 10 MG
10 TABLET ORAL NIGHTLY
Qty: 90 TABLET | Refills: 3 | Status: SHIPPED | OUTPATIENT
Start: 2021-01-15 | End: 2021-10-14 | Stop reason: SDUPTHER

## 2021-01-15 RX ORDER — CELECOXIB 200 MG/1
200 CAPSULE ORAL 2 TIMES DAILY
Qty: 180 CAPSULE | Refills: 3 | Status: SHIPPED | OUTPATIENT
Start: 2021-01-15 | End: 2021-10-14 | Stop reason: SDUPTHER

## 2021-01-15 ASSESSMENT — LIFESTYLE VARIABLES: HOW OFTEN DO YOU HAVE A DRINK CONTAINING ALCOHOL: 0

## 2021-01-15 ASSESSMENT — PATIENT HEALTH QUESTIONNAIRE - PHQ9
SUM OF ALL RESPONSES TO PHQ QUESTIONS 1-9: 0
1. LITTLE INTEREST OR PLEASURE IN DOING THINGS: 0

## 2021-01-15 NOTE — PROGRESS NOTES
Medicare Annual Wellness Visit  Name: Surya James Date: 1/15/2021   MRN: [id number] Sex: Female   Age: 72 y.o. Ethnicity: Non-/Non    : 1950 Race: Sukhdeep Nair is here for Medicare AWV    Screenings for behavioral, psychosocial and functional/safety risks, and cognitive dysfunction are all negative except as indicated below. These results, as well as other patient data from the [address] form, are documented in Flowsheets linked to this Encounter. BP is high. Log from home shows BP is high-- sheet scanned. No diet changes. Issue is at night -- snack. Tramadol use BID currently for severe arthritis. celebrex 200 mg BID ongoing use. Anxiety -- present still,  in nursing home. Trazadone helping. Xanax use has stopped. Restless legs since start of medications, notices about an hour after use of trazodone. No swelling in legs. She is able to fall asleep. No Known Allergies      Prior to Visit Medications    Medication Sig Taking? Authorizing Provider   celecoxib (CELEBREX) 200 MG capsule Take 1 capsule by mouth 2 times daily Yes Vadim Lantigua MD   Handicap Placard MISC by Does not apply route EXPIRES 5 YEARS Yes Vadim Lantigua MD   lisinopril (PRINIVIL;ZESTRIL) 20 MG tablet Take 1 tablet by mouth nightly Yes Vadim Lantigua MD   simvastatin (ZOCOR) 10 MG tablet Take 1 tablet by mouth nightly Yes Vadim Lantigua MD   traMADol (ULTRAM) 50 MG tablet Take 1 tablet by mouth every 6 hours as needed for Pain for up to 90 days. Yes Vadim Lantigua MD   traZODone (DESYREL) 50 MG tablet Take 1 tablet by mouth nightly as needed for Sleep Yes Vadim Lantigua MD   atenolol (TENORMIN) 50 MG tablet Take 1 tablet by mouth daily Yes Vadim Lantigua MD   therapeutic multivitamin-minerals (THERAGRAN-M) tablet Take 1 tablet by mouth daily.  Yes Historical Provider, MD   conjugated estrogens (PREMARIN) 0.625 MG/GM vaginal cream Place vaginally daily. As needed. Idalmis Loza MD         Past Medical History:   Diagnosis Date    Arthritis     Diabetic ulcer of toe of left foot associated with type 2 diabetes mellitus, limited to breakdown of skin ([address] 8/31/2020    Type II or unspecified type diabetes mellitus without mention of complication, not stated as uncontrolled        Past Surgical History:   Procedure Laterality Date    JOINT REPLACEMENT      BOTH Hips    JOINT REPLACEMENT      BOTH knees         Family History   Problem Relation Age of Onset    Arthritis Mother     Heart Disease Father     High Blood Pressure Father     Diabetes Father        CareTeam (Including outside providers/suppliers regularly involved in providing care):   Patient Care Team:  Carmen Ruelas MD as PCP - General (Family Medicine)  Carmen Ruelas MD as PCP - [facility] [address] Provider    Wt Readings from Last 3 Encounters:   01/15/21 182 lb (82.6 kg)   12/07/20 183 lb 3.2 oz (83.1 kg)   08/31/20 187 lb 9.6 oz (85.1 kg)     Vitals:    01/15/21 0900 01/15/21 0936   BP: (!) 160/92 (!) 146/90   Site: Left Upper Arm    Position: Sitting    Cuff Size: Large Adult    Pulse: 68    Temp: 97.8 °F (36.6 °C)    SpO2: 98%    Weight: 182 lb (82.6 kg)    Height: 5' 7\" (1.702 m)      Body mass index is 28.51 kg/m². Based upon direct observation of the patient, evaluation of cognition reveals recent and remote memory intact. General Appearance: alert and oriented to person, place and time, well-developed and well-nourished, in no acute distress  Pulmonary/Chest: clear to auscultation bilaterally- no wheezes, rales or rhonchi, normal air movement, no respiratory distress  Cardiovascular: normal rate and normal S1 and S2  Extremities: significant OA changes with slight contracture of some fingers. no cyanosis and no clubbing. Mild puffyness lower legs, spider veins.      Patient's complete Health Risk Assessment and screening values have been reviewed and are found in [facility]. The following problems were reviewed today and where indicated follow up appointments were made and/or referrals ordered. Positive Risk Factor Screenings with Interventions:            General Health and ACP:  General  In general, how would you say your health is?: Very Good  In the past 7 days, have you experienced any of the following?  New or Increased Pain, New or Increased Fatigue, Loneliness, Social Isolation, Stress or Anger?: None of These  Do you get the social and emotional support that you need?: Yes  Do you have a Living Will?: Yes  Advance Directives     Power of  Living Will ACP-Advance Directive ACP-Power of     Not on File Not on File Not on File Not on File      General Health Risk Interventions:  · no interventiosn needed     Hearing/Vision:  No exam data present  Hearing/Vision  Do you or your family notice any trouble with your hearing?: No  Do you have difficulty driving, watching TV, or doing any of your daily activities because of your eyesight?: No  Have you had an eye exam within the past year?: (!) No  Hearing/Vision Interventions:  · Vision concerns:  patient encouraged to make appointment with his/her eye specialist      Personalized Preventive Plan   Current Health Maintenance Status  Immunization History   Administered Date(s) Administered    Influenza, Quadv, IM, PF (6 mo and older Fluzone, Flulaval, Fluarix, and 3 yrs and older Afluria) 01/13/2020    Influenza, Quadv, adjuvanted, 65 yrs +, IM, PF (Fluad) 10/22/2020    Pneumococcal Polysaccharide ([id number]) 01/13/2020    Tdap (Boostrix, Adacel) 08/11/2013        Health Maintenance   Topic Date Due    Shingles Vaccine (1 of 2) 04/16/2001    DEXA (modify frequency per FRAX score)  04/16/2006    Diabetic retinal exam  08/23/2017    Annual Wellness Visit (AWV)  05/29/2019    Colon Cancer Screen FIT/FOBT  07/25/2020    Diabetic foot exam  08/31/2021    A1C test (Diabetic or Prediabetic)  09/11/2021    Diabetic microalbuminuria test  09/11/2021    Lipid screen  09/11/2021    Potassium monitoring  09/11/2021    Creatinine monitoring  09/11/2021    Breast cancer screen  10/28/2021    DTaP/Tdap/Td vaccine (2 - Td) 08/11/2023    Flu vaccine  Completed    Pneumococcal 65+ years Vaccine  Completed    Hepatitis A vaccine  Aged Out    Hib vaccine  Aged Out    Meningococcal (ACWY) vaccine  Aged Out    Hepatitis C screen  Discontinued     Recommendations for [email or web address] Due: see orders and patient instructions/AVS.  . Recommended screening schedule for the next 5-10 years is provided to the patient in written form: see Patient Scott Burns was seen today for medicare awv. Diagnoses and all orders for this visit:    Routine general medical examination at a health care facility    Arthritis  -     celecoxib (CELEBREX) 200 MG capsule; Take 1 capsule by mouth 2 times daily  -     Handicap Placard MISC; by Does not apply route EXPIRES 5 YEARS  -     Comprehensive Metabolic Panel; Future  -     Lipid Panel; Future  -     Hemoglobin A1C; Future    Essential hypertension  -     lisinopril (PRINIVIL;ZESTRIL) 20 MG tablet; Take 1 tablet by mouth nightly  -     Comprehensive Metabolic Panel; Future  -     Lipid Panel; Future    Diabetic polyneuropathy associated with type 2 diabetes mellitus ([facility] [address]  -     Comprehensive Metabolic Panel; Future  -     Lipid Panel; Future  -     Hemoglobin A1C; Future    SVT (supraventricular tachycardia) (HCC)  -     Comprehensive Metabolic Panel; Future  -     Lipid Panel; Future    Postmenopausal  -     DEXA BONE DENSITY AXIAL SKELETON; Future    Pure hypercholesterolemia  -     simvastatin (ZOCOR) 10 MG tablet; Take 1 tablet by mouth nightly  -     Comprehensive Metabolic Panel; Future  -     Lipid Panel; Future    Primary insomnia  -     Comprehensive Metabolic Panel;  Future    Type 2 diabetes mellitus without complication, without long-term current use of insulin (HCC)  -     Comprehensive Metabolic Panel; Future  -     Lipid Panel; Future  -     Hemoglobin A1C; Future    Screen for colon cancer  -     POCT Fecal Immunochemical Test (FIT)           Unstable HTN. Lisinopril -- Possible side effects discussed and patient voiced understanding. Will see how leg restlessness changes with better BP. Encouraged gentle stretches. Lab work due prior to next visit  Tramadol refill not due yet -- off cycle with visits.

## 2021-01-15 NOTE — PATIENT INSTRUCTIONS
To protect your eyes from damage due to diabetes, please schedule an appointment with your ophthalmologist. Please let us know if you need help doing this. Personalized Preventive Plan for Pritesh Shahid - 1/15/2021  Medicare offers a range of preventive health benefits. Some of the tests and screenings are paid in full while other may be subject to a deductible, co-insurance, and/or copay. Some of these benefits include a comprehensive review of your medical history including lifestyle, illnesses that may run in your family, and various assessments and screenings as appropriate. After reviewing your medical record and screening and assessments performed today your provider may have ordered immunizations, labs, imaging, and/or referrals for you. A list of these orders (if applicable) as well as your Preventive Care list are included within your After Visit Summary for your review. Other Preventive Recommendations:    · A preventive eye exam performed by an eye specialist is recommended every 1-2 years to screen for glaucoma; cataracts, macular degeneration, and other eye disorders. · A preventive dental visit is recommended every 6 months. · Try to get at least 150 minutes of exercise per week or 10,000 steps per day on a pedometer . · Order or download the FREE \"Exercise & Physical Activity: Your Everyday Guide\" from [employer]. Call 8-244.170.4063 or search [employer] online. · You need 6105-2103 mg of calcium and 3246-5803 IU of vitamin D per day. It is possible to meet your calcium requirement with diet alone, but a vitamin D supplement is usually necessary to meet this goal.  · When exposed to the sun, use a sunscreen that protects against both UVA and UVB radiation with an SPF of 30 or greater. Reapply every 2 to 3 hours or after sweating, drying off with a towel, or swimming. · Always wear a seat belt when traveling in a car.  Always wear a helmet when riding a bicycle or motorcycle. Smart Snacking! When hunger strikes, be ready to strike back with a snack attack! Before you grab just anything off the shelf, make sure your snack choice is the right one. Try these snack ideas when you have that certain craving! Crunchy:  Vegetable Sticks (carrot, celery, cucumber, bell pepper, zucchini)  Broccoli or Cauliflower Knapp  Fruit Slices (apples, pears)  Unsalted Rice Cakes  Unsalted Popcorn    Sweet:  Fresh Fruits (radha, apple, banana, frozen grapes, pineapple)  Palm full of Dried Fruits (no sugar added)  Unsweetened Canned Fruit  Plain Yogurt or Cottage Cheese with Fruit  An ounce of Dark Chocolate    Salty:  1/4 Cup [employer] Seeds  One Henao [place] full of Lightly Salted Nuts  Palm full of Olives  Hummus with Veggie Sticks  1 Ounce Low Fat Cheese  Unbuttered Popcorn (try making your own seasoning blend)    Thirsty:  Water! Club Soda with Fresh [place] and [employer] or Edwar [place] with Half 100% Fruit or Veggie Juice  Unsweetened Green Tea    Ask your snack these questions to be sure it has your best interests at heart:    Is it... Darletta Ray Singha Ray . baked? ... made with whole grains? ... low sodium? ... reduced fat? ... just one serving? Good snacks say: YES! Does it have. .. Darletta Ray .. no sugar added? ... no added salt? ... zero trans fat? ... about 100 calories? ... less than 10 grams of sugar? ... more than 5 grams of fiber? ... less than 1 gram of saturated fat? Good snacks say: YES! Was it. .. Darletta Ray .. fried? ... \"flavor blasted\"? ... stuffed or loaded? ... covered in caramel? ... dipped in chocolate? Good snacks say: NO!

## 2021-01-22 ENCOUNTER — HOSPITAL ENCOUNTER (OUTPATIENT)
Dept: WOMENS IMAGING | Age: 71
Discharge: HOME | End: 2021-01-22
Payer: MEDICARE

## 2021-01-22 DIAGNOSIS — Z78.0 POSTMENOPAUSAL: ICD-10-CM

## 2021-01-22 PROCEDURE — 77080 DXA BONE DENSITY AXIAL: CPT

## 2021-02-04 LAB
CONTROL: PRESENT
HEMOCCULT STL QL: NEGATIVE

## 2021-04-13 ENCOUNTER — HOSPITAL ENCOUNTER (OUTPATIENT)
Age: 71
Discharge: HOME | End: 2021-04-13
Payer: MEDICARE

## 2021-04-13 DIAGNOSIS — M19.90 ARTHRITIS: Chronic | ICD-10-CM

## 2021-04-13 DIAGNOSIS — F51.01 PRIMARY INSOMNIA: ICD-10-CM

## 2021-04-13 DIAGNOSIS — I10 ESSENTIAL HYPERTENSION: ICD-10-CM

## 2021-04-13 DIAGNOSIS — E11.9 TYPE 2 DIABETES MELLITUS WITHOUT COMPLICATION, WITHOUT LONG-TERM CURRENT USE OF INSULIN (HCC): ICD-10-CM

## 2021-04-13 DIAGNOSIS — I47.1 SVT (SUPRAVENTRICULAR TACHYCARDIA) (HCC): ICD-10-CM

## 2021-04-13 DIAGNOSIS — E11.42 DIABETIC POLYNEUROPATHY ASSOCIATED WITH TYPE 2 DIABETES MELLITUS (HCC): ICD-10-CM

## 2021-04-13 DIAGNOSIS — E78.00 PURE HYPERCHOLESTEROLEMIA: ICD-10-CM

## 2021-04-13 LAB
ALBUMIN SERPL-MCNC: 4.5 G/DL (ref 3.5–5.1)
ALP BLD-CCNC: 101 U/L (ref 38–126)
ALT SERPL-CCNC: 16 U/L (ref 11–66)
ANION GAP SERPL CALCULATED.3IONS-SCNC: 9 MEQ/L (ref 8–16)
AST SERPL-CCNC: 23 U/L (ref 5–40)
AVERAGE GLUCOSE: 129 MG/DL (ref 70–126)
BILIRUB SERPL-MCNC: 0.3 MG/DL (ref 0.3–1.2)
BUN BLDV-MCNC: 14 MG/DL (ref 7–22)
CALCIUM SERPL-MCNC: 9.1 MG/DL (ref 8.5–10.5)
CHLORIDE BLD-SCNC: 104 MEQ/L (ref 98–111)
CHOLESTEROL, TOTAL: 165 MG/DL (ref 100–199)
CO2: 27 MEQ/L (ref 23–33)
CREAT SERPL-MCNC: 0.7 MG/DL (ref 0.4–1.2)
GFR SERPL CREATININE-BSD FRML MDRD: 83 ML/MIN/1.73M2
GLUCOSE BLD-MCNC: 164 MG/DL (ref 70–108)
HBA1C MFR BLD: 6.3 % (ref 4.4–6.4)
HDLC SERPL-MCNC: 46 MG/DL
LDL CHOLESTEROL CALCULATED: 98 MG/DL
POTASSIUM SERPL-SCNC: 4.6 MEQ/L (ref 3.5–5.2)
SODIUM BLD-SCNC: 140 MEQ/L (ref 135–145)
TOTAL PROTEIN: 6.7 G/DL (ref 6.1–8)
TRIGL SERPL-MCNC: 107 MG/DL (ref 0–199)

## 2021-04-13 PROCEDURE — 83036 HEMOGLOBIN GLYCOSYLATED A1C: CPT

## 2021-04-13 PROCEDURE — 80053 COMPREHEN METABOLIC PANEL: CPT

## 2021-04-13 PROCEDURE — 36415 COLL VENOUS BLD VENIPUNCTURE: CPT

## 2021-04-13 PROCEDURE — 80061 LIPID PANEL: CPT

## 2021-04-16 ENCOUNTER — OFFICE VISIT (OUTPATIENT)
Dept: FAMILY MEDICINE CLINIC | Age: 71
End: 2021-04-16
Payer: MEDICARE

## 2021-04-16 VITALS
HEART RATE: 60 BPM | SYSTOLIC BLOOD PRESSURE: 160 MMHG | DIASTOLIC BLOOD PRESSURE: 80 MMHG | BODY MASS INDEX: 29.29 KG/M2 | TEMPERATURE: 96.4 F | WEIGHT: 187 LBS

## 2021-04-16 DIAGNOSIS — I47.1 SVT (SUPRAVENTRICULAR TACHYCARDIA) (HCC): Chronic | ICD-10-CM

## 2021-04-16 DIAGNOSIS — M19.90 ARTHRITIS: Chronic | ICD-10-CM

## 2021-04-16 DIAGNOSIS — E78.00 PURE HYPERCHOLESTEROLEMIA: ICD-10-CM

## 2021-04-16 DIAGNOSIS — I10 ESSENTIAL HYPERTENSION: ICD-10-CM

## 2021-04-16 DIAGNOSIS — E11.9 TYPE 2 DIABETES MELLITUS WITHOUT COMPLICATION, WITHOUT LONG-TERM CURRENT USE OF INSULIN (HCC): Primary | ICD-10-CM

## 2021-04-16 PROCEDURE — 3044F HG A1C LEVEL LT 7.0%: CPT | Performed by: FAMILY MEDICINE

## 2021-04-16 PROCEDURE — G8399 PT W/DXA RESULTS DOCUMENT: HCPCS | Performed by: FAMILY MEDICINE

## 2021-04-16 PROCEDURE — 2022F DILAT RTA XM EVC RTNOPTHY: CPT | Performed by: FAMILY MEDICINE

## 2021-04-16 PROCEDURE — G8427 DOCREV CUR MEDS BY ELIG CLIN: HCPCS | Performed by: FAMILY MEDICINE

## 2021-04-16 PROCEDURE — G8417 CALC BMI ABV UP PARAM F/U: HCPCS | Performed by: FAMILY MEDICINE

## 2021-04-16 PROCEDURE — 1036F TOBACCO NON-USER: CPT | Performed by: FAMILY MEDICINE

## 2021-04-16 PROCEDURE — 4040F PNEUMOC VAC/ADMIN/RCVD: CPT | Performed by: FAMILY MEDICINE

## 2021-04-16 PROCEDURE — 1090F PRES/ABSN URINE INCON ASSESS: CPT | Performed by: FAMILY MEDICINE

## 2021-04-16 PROCEDURE — 1123F ACP DISCUSS/DSCN MKR DOCD: CPT | Performed by: FAMILY MEDICINE

## 2021-04-16 PROCEDURE — 99214 OFFICE O/P EST MOD 30 MIN: CPT | Performed by: FAMILY MEDICINE

## 2021-04-16 PROCEDURE — 3017F COLORECTAL CA SCREEN DOC REV: CPT | Performed by: FAMILY MEDICINE

## 2021-04-16 RX ORDER — ATENOLOL 50 MG/1
50 TABLET ORAL DAILY
Qty: 90 TABLET | Refills: 3 | Status: CANCELLED | OUTPATIENT
Start: 2021-04-16 | End: 2021-10-12

## 2021-04-16 RX ORDER — CELECOXIB 200 MG/1
200 CAPSULE ORAL 2 TIMES DAILY
Qty: 180 CAPSULE | Refills: 3 | Status: CANCELLED | OUTPATIENT
Start: 2021-04-16 | End: 2021-10-12

## 2021-04-16 RX ORDER — LISINOPRIL AND HYDROCHLOROTHIAZIDE 20; 12.5 MG/1; MG/1
1 TABLET ORAL DAILY
Qty: 90 TABLET | Refills: 3 | Status: SHIPPED | OUTPATIENT
Start: 2021-04-16 | End: 2021-10-14 | Stop reason: SDUPTHER

## 2021-04-16 ASSESSMENT — ENCOUNTER SYMPTOMS: SHORTNESS OF BREATH: 0

## 2021-04-16 NOTE — PATIENT INSTRUCTIONS
Staff -- please repeat blood pressure for this patient before patient leaves the office. If blood pressure not <140/90, then please arrange follow up in 1 month with nurse visit. Thank you    Sodium intake is recommended to stay 2.4 grams per day (2400 mg). To protect your eyes from damage due to diabetes, please schedule an appointment with your ophthalmologist. Please let us know if you need help doing this.

## 2021-04-16 NOTE — PROGRESS NOTES
Juan M Jordan (:  1950) is a 80 y.o. female,Established patient, here for evaluation of the following chief complaint(s):  3 Month Follow-Up, Diabetes, and Hypertension      ASSESSMENT/PLAN:  1. Type 2 diabetes mellitus without complication, without long-term current use of insulin (HCC)  -     lisinopril-hydroCHLOROthiazide (PRINZIDE;ZESTORETIC) 20-12.5 MG per tablet; Take 1 tablet by mouth daily, Disp-90 tablet, R-3Normal  -     Comprehensive Metabolic Panel; Future  -     Hemoglobin A1C; Future  -     Microalbumin / Creatinine Urine Ratio; Future  -     Lipid Panel; Future  2. Arthritis  3. SVT (supraventricular tachycardia) (HCC)  -     Comprehensive Metabolic Panel; Future  -     Hemoglobin A1C; Future  -     Microalbumin / Creatinine Urine Ratio; Future  -     Lipid Panel; Future  4. Essential hypertension  -     lisinopril-hydroCHLOROthiazide (PRINZIDE;ZESTORETIC) 20-12.5 MG per tablet; Take 1 tablet by mouth daily, Disp-90 tablet, R-3Normal  -     Comprehensive Metabolic Panel; Future  -     Hemoglobin A1C; Future  -     Microalbumin / Creatinine Urine Ratio; Future  -     Lipid Panel; Future  5. Pure hypercholesterolemia  -     Comprehensive Metabolic Panel; Future  -     Hemoglobin A1C; Future  -     Microalbumin / Creatinine Urine Ratio; Future  -     Lipid Panel; Future    BP not controlled. Will add HCTZ. Will need BP and lab follow up. Encouraged low sodium diet, continued good exercise and sugar controlled. Return for 1 mo Nurse BP visit. 6 mo for labs, DM.    SUBJECTIVE/OBJECTIVE:  HPI     Checking BP at home and they are high 140s-160s/ 70s-90s. Feeling well otherwise. Staying active by walking. No low sugars. Some fatigue. Manageable. Sleep is okay. Tolerating medications. 3/22/2021 -- Tramadol 100 tablets. Using for arthritic issues. Labs reviewed with her in detail  Review of Systems   Constitutional: Negative for fatigue and fever.    Respiratory: Negative for shortness of breath. Cardiovascular: Negative for chest pain and leg swelling. Physical Exam  Constitutional:       General: She is not in acute distress. Appearance: Normal appearance. She is not ill-appearing. Cardiovascular:      Rate and Rhythm: Normal rate and regular rhythm. Heart sounds: No murmur. Pulmonary:      Effort: Pulmonary effort is normal. No respiratory distress. Breath sounds: Normal breath sounds. No wheezing. Musculoskeletal:         General: No swelling. Neurological:      Mental Status: She is alert.    Psychiatric:         Mood and Affect: Mood normal.         Diabetes labs reviewed:  Lab Results   Component Value Date    LABA1C 6.3 04/13/2021     No results found for: EAG  Lab Results   Component Value Date    LABA1C 6.3 04/13/2021    LABA1C 6.4 09/11/2020    LABA1C 6.3 01/13/2020       Lab Results   Component Value Date     04/13/2021    K 4.6 04/13/2021     04/13/2021    CO2 27 04/13/2021    BUN 14 04/13/2021    CREATININE 0.7 04/13/2021    CALCIUM 9.1 04/13/2021    GLUCOSE 164 (H) 04/13/2021        Lab Results   Component Value Date    CHOL 165 04/13/2021    CHOL 231 (H) 09/11/2020    CHOL 212 (H) 07/25/2019     Lab Results   Component Value Date    TRIG 107 04/13/2021    TRIG 103 09/11/2020    TRIG 96 07/25/2019     Lab Results   Component Value Date    HDL 46 04/13/2021    HDL 46 09/11/2020    HDL 48 07/25/2019     Lab Results   Component Value Date    LDLCALC 98 04/13/2021    LDLCALC 164 09/11/2020    LDLCALC 145 07/25/2019     Lab Results   Component Value Date    LABVLDL 19 06/22/2015    VLDL 25 07/18/2016     Lab Results   Component Value Date    CHOLHDLRATIO 4.8 06/22/2015       Lab Results   Component Value Date    LABMICR < 1.20 09/11/2020       Lab Results   Component Value Date    TSH 2.460 09/11/2020      Lab Results   Component Value Date    MKICKAGI33 596 09/11/2020      No results found for: MG   Lab Results   Component Value Date

## 2021-05-14 ENCOUNTER — OFFICE VISIT (OUTPATIENT)
Dept: FAMILY MEDICINE CLINIC | Age: 71
End: 2021-05-14
Payer: MEDICARE

## 2021-05-14 VITALS
HEART RATE: 66 BPM | DIASTOLIC BLOOD PRESSURE: 76 MMHG | OXYGEN SATURATION: 98 % | WEIGHT: 185 LBS | TEMPERATURE: 98.1 F | BODY MASS INDEX: 28.98 KG/M2 | SYSTOLIC BLOOD PRESSURE: 132 MMHG

## 2021-05-14 DIAGNOSIS — H61.23 BILATERAL IMPACTED CERUMEN: Primary | ICD-10-CM

## 2021-05-14 DIAGNOSIS — M19.90 ARTHRITIS: Chronic | ICD-10-CM

## 2021-05-14 DIAGNOSIS — H91.93 DECREASED HEARING OF BOTH EARS: ICD-10-CM

## 2021-05-14 DIAGNOSIS — I10 ESSENTIAL HYPERTENSION: ICD-10-CM

## 2021-05-14 PROCEDURE — 99213 OFFICE O/P EST LOW 20 MIN: CPT | Performed by: FAMILY MEDICINE

## 2021-05-14 PROCEDURE — 1090F PRES/ABSN URINE INCON ASSESS: CPT | Performed by: FAMILY MEDICINE

## 2021-05-14 PROCEDURE — 1123F ACP DISCUSS/DSCN MKR DOCD: CPT | Performed by: FAMILY MEDICINE

## 2021-05-14 PROCEDURE — G8399 PT W/DXA RESULTS DOCUMENT: HCPCS | Performed by: FAMILY MEDICINE

## 2021-05-14 PROCEDURE — G8427 DOCREV CUR MEDS BY ELIG CLIN: HCPCS | Performed by: FAMILY MEDICINE

## 2021-05-14 PROCEDURE — 3017F COLORECTAL CA SCREEN DOC REV: CPT | Performed by: FAMILY MEDICINE

## 2021-05-14 PROCEDURE — 4040F PNEUMOC VAC/ADMIN/RCVD: CPT | Performed by: FAMILY MEDICINE

## 2021-05-14 PROCEDURE — 1036F TOBACCO NON-USER: CPT | Performed by: FAMILY MEDICINE

## 2021-05-14 PROCEDURE — G8417 CALC BMI ABV UP PARAM F/U: HCPCS | Performed by: FAMILY MEDICINE

## 2021-05-14 PROCEDURE — 69209 REMOVE IMPACTED EAR WAX UNI: CPT | Performed by: FAMILY MEDICINE

## 2021-05-14 RX ORDER — TRAMADOL HYDROCHLORIDE 50 MG/1
50 TABLET ORAL EVERY 6 HOURS PRN
Qty: 60 TABLET | Refills: 4 | Status: SHIPPED | OUTPATIENT
Start: 2021-05-14 | End: 2021-10-14 | Stop reason: SDUPTHER

## 2021-05-14 ASSESSMENT — ENCOUNTER SYMPTOMS: SHORTNESS OF BREATH: 0

## 2021-05-14 NOTE — PROGRESS NOTES
[address] [place] [address] [address], [address], [place]  Phone:  [phone]  ISK:[phone]       Name: Mary Golden  :     Chief Complaint   Patient presents with    Cerumen Impaction     needs ears cleaned before hearing test       HPI:     Mary Golden is a 80 y.o. female who presents today for     HPI    Patient her hearing decreased and was evaluated and wax was noted in her ears bilaterally. She had no pain or drainage. She has noted trouble in the past with wax. Also she is needing a tramadol refill. She uses for arthritis that is not helped sufficiently by the Celebrex 200 mg twice a day. Tramadol is used up to 2 times a day but has been more consistently. Her last refill was in 2021, and she is currently out. She denies adverse effects from this medication. They are helping her function to care for self and her home. Blood pressure is looking well. She had an appointment for May 21 for blood pressure recheck. This is looking well here she is tolerating the medication quite nicely. Current Outpatient Medications:     traMADol (ULTRAM) 50 MG tablet, Take 1 tablet by mouth every 6 hours as needed for Pain for up to 90 days. , Disp: 60 tablet, Rfl: 4    lisinopril-hydroCHLOROthiazide (PRINZIDE;ZESTORETIC) 20-12.5 MG per tablet, Take 1 tablet by mouth daily, Disp: 90 tablet, Rfl: 3    celecoxib (CELEBREX) 200 MG capsule, Take 1 capsule by mouth 2 times daily, Disp: 180 capsule, Rfl: 3    Handicap Placard MISC, by Does not apply route EXPIRES 5 YEARS, Disp: 1 each, Rfl: 0    simvastatin (ZOCOR) 10 MG tablet, Take 1 tablet by mouth nightly, Disp: 90 tablet, Rfl: 3    traZODone (DESYREL) 50 MG tablet, Take 1 tablet by mouth nightly as needed for Sleep, Disp: 30 tablet, Rfl: 5    atenolol (TENORMIN) 50 MG tablet, Take 1 tablet by mouth daily, Disp: 90 tablet, Rfl: 3    therapeutic multivitamin-minerals (THERAGRAN-M) tablet, Take 1

## 2021-10-12 LAB
ALBUMIN SERPL-MCNC: 4.1 G/DL
ALP BLD-CCNC: 94 U/L
ALT SERPL-CCNC: 21 U/L
ANION GAP SERPL CALCULATED.3IONS-SCNC: 7 MMOL/L
AST SERPL-CCNC: 21 U/L
AVERAGE GLUCOSE: NORMAL
BILIRUB SERPL-MCNC: 0.5 MG/DL (ref 0.1–1.4)
BUN BLDV-MCNC: 14 MG/DL
CALCIUM SERPL-MCNC: 9 MG/DL
CHLORIDE BLD-SCNC: 105 MMOL/L
CHOLESTEROL, TOTAL: 173 MG/DL
CHOLESTEROL/HDL RATIO: NORMAL
CO2: 105 MMOL/L
CREAT SERPL-MCNC: 0.76 MG/DL
GFR CALCULATED: NORMAL
GLUCOSE BLD-MCNC: 156 MG/DL
HBA1C MFR BLD: 6.7 %
HDLC SERPL-MCNC: 51 MG/DL (ref 35–70)
HEMOGLOBIN: NORMAL
LDL CHOLESTEROL CALCULATED: 100 MG/DL (ref 0–160)
NONHDLC SERPL-MCNC: NORMAL MG/DL
POTASSIUM SERPL-SCNC: 4.4 MMOL/L
SODIUM BLD-SCNC: 142 MMOL/L
TOTAL PROTEIN: 6.3
TRIGL SERPL-MCNC: 108 MG/DL
VLDLC SERPL CALC-MCNC: 22 MG/DL

## 2021-10-14 ENCOUNTER — OFFICE VISIT (OUTPATIENT)
Dept: FAMILY MEDICINE CLINIC | Age: 71
End: 2021-10-14
Payer: MEDICARE

## 2021-10-14 VITALS
TEMPERATURE: 97 F | HEIGHT: 67 IN | BODY MASS INDEX: 29.35 KG/M2 | OXYGEN SATURATION: 98 % | HEART RATE: 55 BPM | WEIGHT: 187 LBS | SYSTOLIC BLOOD PRESSURE: 166 MMHG | DIASTOLIC BLOOD PRESSURE: 86 MMHG

## 2021-10-14 DIAGNOSIS — E11.9 TYPE 2 DIABETES MELLITUS WITHOUT COMPLICATION, WITHOUT LONG-TERM CURRENT USE OF INSULIN (HCC): Primary | ICD-10-CM

## 2021-10-14 DIAGNOSIS — F51.01 PRIMARY INSOMNIA: ICD-10-CM

## 2021-10-14 DIAGNOSIS — I10 ESSENTIAL HYPERTENSION: ICD-10-CM

## 2021-10-14 DIAGNOSIS — E78.00 PURE HYPERCHOLESTEROLEMIA: Chronic | ICD-10-CM

## 2021-10-14 DIAGNOSIS — I47.1 SVT (SUPRAVENTRICULAR TACHYCARDIA) (HCC): Chronic | ICD-10-CM

## 2021-10-14 DIAGNOSIS — M19.90 ARTHRITIS: Chronic | ICD-10-CM

## 2021-10-14 PROCEDURE — 3017F COLORECTAL CA SCREEN DOC REV: CPT | Performed by: FAMILY MEDICINE

## 2021-10-14 PROCEDURE — 4040F PNEUMOC VAC/ADMIN/RCVD: CPT | Performed by: FAMILY MEDICINE

## 2021-10-14 PROCEDURE — 1090F PRES/ABSN URINE INCON ASSESS: CPT | Performed by: FAMILY MEDICINE

## 2021-10-14 PROCEDURE — 99214 OFFICE O/P EST MOD 30 MIN: CPT | Performed by: FAMILY MEDICINE

## 2021-10-14 PROCEDURE — G8427 DOCREV CUR MEDS BY ELIG CLIN: HCPCS | Performed by: FAMILY MEDICINE

## 2021-10-14 PROCEDURE — 1123F ACP DISCUSS/DSCN MKR DOCD: CPT | Performed by: FAMILY MEDICINE

## 2021-10-14 PROCEDURE — 1036F TOBACCO NON-USER: CPT | Performed by: FAMILY MEDICINE

## 2021-10-14 PROCEDURE — G8484 FLU IMMUNIZE NO ADMIN: HCPCS | Performed by: FAMILY MEDICINE

## 2021-10-14 PROCEDURE — 2022F DILAT RTA XM EVC RTNOPTHY: CPT | Performed by: FAMILY MEDICINE

## 2021-10-14 PROCEDURE — G8399 PT W/DXA RESULTS DOCUMENT: HCPCS | Performed by: FAMILY MEDICINE

## 2021-10-14 PROCEDURE — 3044F HG A1C LEVEL LT 7.0%: CPT | Performed by: FAMILY MEDICINE

## 2021-10-14 PROCEDURE — G8417 CALC BMI ABV UP PARAM F/U: HCPCS | Performed by: FAMILY MEDICINE

## 2021-10-14 RX ORDER — TRAZODONE HYDROCHLORIDE 50 MG/1
50 TABLET ORAL NIGHTLY PRN
Qty: 30 TABLET | Refills: 5 | Status: SHIPPED | OUTPATIENT
Start: 2021-10-14 | End: 2022-04-19 | Stop reason: SDUPTHER

## 2021-10-14 RX ORDER — CELECOXIB 200 MG/1
200 CAPSULE ORAL 2 TIMES DAILY
Qty: 180 CAPSULE | Refills: 3 | Status: SHIPPED | OUTPATIENT
Start: 2021-10-14 | End: 2022-04-19 | Stop reason: SDUPTHER

## 2021-10-14 RX ORDER — LISINOPRIL AND HYDROCHLOROTHIAZIDE 20; 12.5 MG/1; MG/1
1 TABLET ORAL DAILY
Qty: 90 TABLET | Refills: 3 | Status: SHIPPED | OUTPATIENT
Start: 2021-10-14 | End: 2021-11-16

## 2021-10-14 RX ORDER — LISINOPRIL 20 MG/1
TABLET ORAL
COMMUNITY
Start: 2021-10-01 | End: 2021-10-14

## 2021-10-14 RX ORDER — ATENOLOL 50 MG/1
50 TABLET ORAL DAILY
Qty: 90 TABLET | Refills: 3 | Status: SHIPPED | OUTPATIENT
Start: 2021-10-14 | End: 2022-04-19 | Stop reason: SDUPTHER

## 2021-10-14 RX ORDER — TRAMADOL HYDROCHLORIDE 50 MG/1
50 TABLET ORAL EVERY 6 HOURS PRN
Qty: 60 TABLET | Refills: 5 | Status: SHIPPED | OUTPATIENT
Start: 2021-10-14 | End: 2022-04-19 | Stop reason: SDUPTHER

## 2021-10-14 RX ORDER — SIMVASTATIN 10 MG
10 TABLET ORAL NIGHTLY
Qty: 90 TABLET | Refills: 3 | Status: SHIPPED | OUTPATIENT
Start: 2021-10-14 | End: 2022-10-19 | Stop reason: SDUPTHER

## 2021-10-14 ASSESSMENT — ENCOUNTER SYMPTOMS: SHORTNESS OF BREATH: 0

## 2021-10-14 NOTE — PATIENT INSTRUCTIONS
Staff -- please repeat blood pressure for this patient before patient leaves the office. If blood pressure not <140/90, then please arrange follow up in 1 month with nurse visit. Thank you    Staff -- please obtain labs from [address] [place] [address] Pathology labs done on Monday    Check at home which lisinopril you are taking  -- lisinopril vs lisinopril-hctz. Smart Snacking! When hunger strikes, be ready to strike back with a snack attack! Before you grab just anything off the shelf, make sure your snack choice is the right one. Try these snack ideas when you have that certain craving! Crunchy:  Vegetable Sticks (carrot, celery, cucumber, bell pepper, zucchini)  Broccoli or Cauliflower Knapp  Fruit Slices (apples, pears)  Unsalted Rice Cakes  Unsalted Popcorn    Sweet:  Fresh Fruits (radha, apple, banana, frozen grapes, pineapple)  Palm full of Dried Fruits (no sugar added)  Unsweetened Canned Fruit  Plain Yogurt or Cottage Cheese with Fruit  An ounce of Dark Chocolate    Salty:  1/4 Cup [employer] Seeds  One Henao [place] full of Lightly Salted Nuts  Palm full of Olives  Hummus with Veggie Sticks  1 Ounce Low Fat Cheese  Unbuttered Popcorn (try making your own seasoning blend)    Thirsty:  Water! Club Soda with Fresh [place] and [employer] or Edwar [place] with Half 100% Fruit or Veggie Juice  Unsweetened Green Tea    Ask your snack these questions to be sure it has your best interests at heart:    Is it... Karlie Gomez . baked? ... made with whole grains? ... low sodium? ... reduced fat? ... just one serving? Good snacks say: YES! Does it have. .. Karlie Gomez .. no sugar added? ... no added salt? ... zero trans fat? ... about 100 calories? ... less than 10 grams of sugar? ... more than 5 grams of fiber? ... less than 1 gram of saturated fat? Good snacks say: YES! Was it. .. Karlie Gomez .. fried? ... \"flavor blasted\"? ... stuffed or loaded? ... covered in caramel? ... dipped in chocolate?     Good snacks say: NO!      Smart Snacking! When hunger strikes, be ready to strike back with a snack attack! Before you grab just anything off the shelf, make sure your snack choice is the right one. Try these snack ideas when you have that certain craving! Crunchy:  Vegetable Sticks (carrot, celery, cucumber, bell pepper, zucchini)  Broccoli or Cauliflower Knapp  Fruit Slices (apples, pears)  Unsalted Rice Cakes  Unsalted Popcorn    Sweet:  Fresh Fruits (radha, apple, banana, frozen grapes, pineapple)  Palm full of Dried Fruits (no sugar added)  Unsweetened Canned Fruit  Plain Yogurt or Cottage Cheese with Fruit  An ounce of Dark Chocolate    Salty:  1/4 Cup [employer] Seeds  One Henao [place] full of Lightly Salted Nuts  Palm full of Olives  Hummus with Veggie Sticks  1 Ounce Low Fat Cheese  Unbuttered Popcorn (try making your own seasoning blend)    Thirsty:  Water! Club Soda with Fresh [place] and [employer] or Edwar Aldridge with Half 100% Fruit or Veggie Juice  Unsweetened Green Tea    Ask your snack these questions to be sure it has your best interests at heart:    Is it... Mago Red Mago Red . baked? ... made with whole grains? ... low sodium? ... reduced fat? ... just one serving? Good snacks say: YES! Does it have. .. Mago Red .. no sugar added? ... no added salt? ... zero trans fat? ... about 100 calories? ... less than 10 grams of sugar? ... more than 5 grams of fiber? ... less than 1 gram of saturated fat? Good snacks say: YES! Was it. .. Mago Red .. fried? ... \"flavor blasted\"? ... stuffed or loaded? ... covered in caramel? ... dipped in chocolate? Good snacks say: NO!

## 2021-10-14 NOTE — PROGRESS NOTES
Benita Arthur (:  1950) is a 80 y.o. female,Established patient, here for evaluation of the following chief complaint(s):  Follow-up and Diabetes         ASSESSMENT/PLAN:  1. Type 2 diabetes mellitus without complication, without long-term current use of insulin (HCC)  -     lisinopril-hydroCHLOROthiazide (PRINZIDE;ZESTORETIC) 20-12.5 MG per tablet; Take 1 tablet by mouth daily, Disp-90 tablet, R-3Normal  -      DIABETES FOOT EXAM  2. Arthritis  -     traMADol (ULTRAM) 50 MG tablet; Take 1 tablet by mouth every 6 hours as needed for Pain for up to 90 days. , Disp-60 tablet, R-5Normal  -     celecoxib (CELEBREX) 200 MG capsule; Take 1 capsule by mouth 2 times daily, Disp-180 capsule, R-3Normal  3. SVT (supraventricular tachycardia) (HCC)  -     atenolol (TENORMIN) 50 MG tablet; Take 1 tablet by mouth daily, Disp-90 tablet, R-3Normal  4. Primary insomnia  -     traZODone (DESYREL) 50 MG tablet; Take 1 tablet by mouth nightly as needed for Sleep, Disp-30 tablet, R-5Normal  5. Essential hypertension  -     lisinopril-hydroCHLOROthiazide (PRINZIDE;ZESTORETIC) 20-12.5 MG per tablet; Take 1 tablet by mouth daily, Disp-90 tablet, R-3Normal  6. Pure hypercholesterolemia  -     simvastatin (ZOCOR) 10 MG tablet; Take 1 tablet by mouth nightly, Disp-90 tablet, R-3Normal    Continue with current medications but needs to clarify which lisinopril she is on. Her BP is too high and thus I will adjust depending on what she is on. Encouraged use of inserts and toe skin protectors/separators to prevent callous. Will obtain labs from [facility] for healthy snacks. Return in about 6 months (around 2022). Subjective   SUBJECTIVE/OBJECTIVE:  HPI     DM/HTN/high cholesterol -- BP not well controlled, confusion on which lisinopril she is on, w/ or w/o HCTZ. Insomnia -- doing well with medication  SVT -- behaving  OA various joints --  celebrex + tramadol 50 mg as needed, uses 60 tab per month. Diet -- overall okay, but more rough time with sugar intake. Treats. Cravings. Exercise -- walking daily  Sleep --  7-8 hours. Tolerating medications well     Foot doctor a month ago. Changing toe position due to OA    Wt Readings from Last 3 Encounters:   10/14/21 187 lb (84.8 kg)   05/14/21 185 lb (83.9 kg)   04/16/21 187 lb (84.8 kg)     BP Readings from Last 10 Encounters:   10/14/21 (!) 166/86   05/14/21 132/76   04/16/21 (!) 160/80   01/15/21 (!) 146/90   12/07/20 (!) 142/84   08/31/20 (!) 150/82   01/13/20 134/80   07/15/19 132/88   01/17/19 136/88   07/19/18 138/80     Visual inspection:  Deformity/amputation: absent  Skin lesions/pre-ulcerative calluses: callous at plantar aspect of 3rd and 4th toe.   -- hallux valgus bilaterally  Edema: right- negative, left- negative    Sensory exam:  Monofilament sensation: normal  (minimum of 5 random plantar locations tested, avoiding callused areas - > 1 area with absence of sensation is + for neuropathy)    Plus at least one of the following:  Pulses: normal,   Pinprick: Intact  Proprioception: absent  Vibration (128 Hz): Absent      Review of Systems   Constitutional: Negative for fatigue and fever. Respiratory: Negative for shortness of breath. Cardiovascular: Negative for chest pain and leg swelling. Objective   Physical Exam  Constitutional:       General: She is not in acute distress. Appearance: Normal appearance. She is not ill-appearing. Cardiovascular:      Rate and Rhythm: Normal rate and regular rhythm. Heart sounds: No murmur heard. Pulmonary:      Effort: Pulmonary effort is normal. No respiratory distress. Breath sounds: Normal breath sounds. No wheezing. Musculoskeletal:         General: No swelling. Neurological:      Mental Status: She is alert and oriented to person, place, and time. Psychiatric:         Mood and Affect: Mood normal.     bilateral knees with scars well healed.        Diabetes labs reviewed:  Lab Results   Component Value Date    LABA1C 6.3 04/13/2021     No results found for: EAG  Lab Results   Component Value Date    LABA1C 6.3 04/13/2021    LABA1C 6.4 09/11/2020    LABA1C 6.3 01/13/2020       Lab Results   Component Value Date     04/13/2021    K 4.6 04/13/2021     04/13/2021    CO2 27 04/13/2021    BUN 14 04/13/2021    CREATININE 0.7 04/13/2021    CALCIUM 9.1 04/13/2021    GLUCOSE 164 (H) 04/13/2021        Lab Results   Component Value Date    CHOL 165 04/13/2021    CHOL 231 (H) 09/11/2020    CHOL 212 (H) 07/25/2019     Lab Results   Component Value Date    TRIG 107 04/13/2021    TRIG 103 09/11/2020    TRIG 96 07/25/2019     Lab Results   Component Value Date    HDL 46 04/13/2021    HDL 46 09/11/2020    HDL 48 07/25/2019     Lab Results   Component Value Date    LDLCALC 98 04/13/2021    LDLCALC 164 09/11/2020    LDLCALC 145 07/25/2019     Lab Results   Component Value Date    LABVLDL 19 06/22/2015    VLDL 25 07/18/2016     Lab Results   Component Value Date    CHOLHDLRATIO 4.8 06/22/2015       Lab Results   Component Value Date    LABMICR < 1.20 09/11/2020       Lab Results   Component Value Date    TSH 2.460 09/11/2020      Lab Results   Component Value Date    XDMPOLXQ94 433 09/11/2020      No results found for: MG   Lab Results   Component Value Date    ALT 16 04/13/2021    AST 23 04/13/2021    ALKPHOS 101 04/13/2021    BILITOT 0.3 04/13/2021        Lab Results   Component Value Date    WBC 3.9 (L) 09/11/2020    HGB 13.7 09/11/2020    HCT 42.9 09/11/2020    MCV 97.9 09/11/2020     09/11/2020           An electronic signature was used to authenticate this note.     --Anurag Moss MD

## 2021-10-15 NOTE — TELEPHONE ENCOUNTER
Jessy Kaplan returns call advised to start Lisinopril- HCTZ , she verbalizes understanding
Left message for Margaretmary Osler to return call regarding medication
Please have her start the lisinopril-HCTZ that I sent to pharmacy. Thank you.
Tequila Harper calls and she has been taking Lisinopril 20 mg daily.  She never started on the Lisinopril - HTCZ
X Size Of Lesion In Cm (Optional): 0
Detail Level: Detailed

## 2021-11-15 ENCOUNTER — NURSE ONLY (OUTPATIENT)
Dept: FAMILY MEDICINE CLINIC | Age: 71
End: 2021-11-15

## 2021-11-15 VITALS — SYSTOLIC BLOOD PRESSURE: 148 MMHG | DIASTOLIC BLOOD PRESSURE: 76 MMHG

## 2021-11-15 DIAGNOSIS — I10 ESSENTIAL HYPERTENSION: Primary | ICD-10-CM

## 2021-11-15 DIAGNOSIS — E11.9 TYPE 2 DIABETES MELLITUS WITHOUT COMPLICATION, WITHOUT LONG-TERM CURRENT USE OF INSULIN (HCC): ICD-10-CM

## 2021-11-15 PROCEDURE — 99999 PR OFFICE/OUTPT VISIT,PROCEDURE ONLY: CPT | Performed by: FAMILY MEDICINE

## 2021-11-15 NOTE — PROGRESS NOTES
Patient has come is today for a blood pressure check. Readings taken 10 minutes apart and both were high. I advised patient I would let Dr Giovanni Wiggins know and would contact her if there are any further instructions.

## 2021-11-16 RX ORDER — LISINOPRIL AND HYDROCHLOROTHIAZIDE 20; 12.5 MG/1; MG/1
1 TABLET ORAL 2 TIMES DAILY
Qty: 180 TABLET | Refills: 3 | Status: SHIPPED | OUTPATIENT
Start: 2021-11-16 | End: 2022-10-19 | Stop reason: SDUPTHER

## 2021-11-17 NOTE — PROGRESS NOTES
Increase lisinopril-hctz 20/12.5 to BID -- sent to pharmacy. And continue atenolol 50 mg daily as previous. Healthy diet, good exercise levels.      BP Readings from Last 3 Encounters:   11/15/21 (!) 148/76   10/14/21 (!) 166/86   05/14/21 132/76

## 2021-11-18 ENCOUNTER — TELEPHONE (OUTPATIENT)
Dept: FAMILY MEDICINE CLINIC | Age: 71
End: 2021-11-18

## 2021-11-18 NOTE — PROGRESS NOTES
Bella Franks returns call Dr. Martínez Concho advice given , new instructions on Atenolol and Lisinopril/HTCZ

## 2022-01-05 ENCOUNTER — HOSPITAL ENCOUNTER (OUTPATIENT)
Dept: MAMMOGRAPHY | Age: 72
Discharge: HOME | End: 2022-01-05
Payer: MEDICARE

## 2022-01-05 DIAGNOSIS — Z12.39 BREAST SCREENING: ICD-10-CM

## 2022-01-05 PROCEDURE — 77063 BREAST TOMOSYNTHESIS BI: CPT

## 2022-01-07 ENCOUNTER — HOSPITAL ENCOUNTER (OUTPATIENT)
Dept: WOMENS IMAGING | Age: 72
Discharge: HOME | End: 2022-01-07
Payer: MEDICARE

## 2022-01-07 DIAGNOSIS — R92.2 BREAST DENSITY: ICD-10-CM

## 2022-01-07 PROCEDURE — G0279 TOMOSYNTHESIS, MAMMO: HCPCS

## 2022-01-07 PROCEDURE — 76642 ULTRASOUND BREAST LIMITED: CPT

## 2022-01-10 ENCOUNTER — HOSPITAL ENCOUNTER (OUTPATIENT)
Dept: WOMENS IMAGING | Age: 72
Discharge: HOME | End: 2022-01-10
Payer: MEDICARE

## 2022-01-10 DIAGNOSIS — N63.0 BREAST MASS: ICD-10-CM

## 2022-01-10 DIAGNOSIS — N63.23 LUMP IN LOWER OUTER QUADRANT OF LEFT BREAST: ICD-10-CM

## 2022-01-10 DIAGNOSIS — N63.13 MASS OF LOWER OUTER QUADRANT OF RIGHT BREAST: ICD-10-CM

## 2022-01-10 PROCEDURE — 88342 IMHCHEM/IMCYTCHM 1ST ANTB: CPT

## 2022-01-10 PROCEDURE — 2709999900 MAM US GUID NDL BIOPSY RIGHT

## 2022-01-10 PROCEDURE — 77065 DX MAMMO INCL CAD UNI: CPT

## 2022-01-10 PROCEDURE — 88377 M/PHMTRC ALYS ISHQUANT/SEMIQ: CPT

## 2022-01-10 PROCEDURE — 88305 TISSUE EXAM BY PATHOLOGIST: CPT

## 2022-01-10 PROCEDURE — 88360 TUMOR IMMUNOHISTOCHEM/MANUAL: CPT

## 2022-01-10 NOTE — PROGRESS NOTES
[facility] [address]  Pre-Biopsy Assessment      Patient Education    Written information about procedure Yes  right   Procedural steps explained Yes Ultrasound Biopsy   Post-op potential: bruising, hematoma, pain Yes    Self-care: activity, care of dressing Yes    Patient verbalized understanding Yes    Consent signed and witnessed Yes      Hormone Therapy Status: n/a    Recent Medication: N/A Last Dose: n/a                                     Hormone Replacement Therapy: no    Previous Breast Biopsy: no    Previous Diagnosis Cancer: no    Hysterectomy:no    Emotional Status: Calm    Language or Physical Barriers: n/a    Comments: n/a      Electronically signed by Jason Aparicio on 1/10/2022 at 9:09 AM

## 2022-01-10 NOTE — PROGRESS NOTES
Breast Biopsy Flowsheet/Post-Operative Care    Date of Procedure: 1/10/2022  Physician: Dr. Chari You  Technologist: Derrek Hager RT(R)(M)    Biopsy:ultrasound guided breast biopsy  Lesion type: Non-palpable  Breast: right    Clock face position: Site #1: 7:00         Primary Method of Detection: Mammogram      Microcalcification's: no   Distribution: N/A      Biopsy Method:   Sertera:    Site # 1    Gauge: 14    # of Passes: 4     Clip: Tribell      Pre-Op Assessment: (BI-RADS)   5.  Highly Suggestive of Malignancy    Patient Tolerated Procedure: good  Complications: n/a  Comments: n/a    Post Operative Care  Steri strips: Yes  Dressing: Gauze, Tape   Ice Applied to Site:  No  Evidence of Bleeding:  No    Pain Verbalized: No      Written Discharge Instructions: Yes  Condition at Discharge: good  Time of Discharge: 9877    Electronically signed by James Birch on 1/10/2022 at 9:55 AM

## 2022-01-11 ENCOUNTER — CLINICAL DOCUMENTATION (OUTPATIENT)
Dept: WOMENS IMAGING | Age: 72
End: 2022-01-11

## 2022-01-11 NOTE — PROGRESS NOTES
Contact Type: [facility]    Diagnosis:  Invasive ductal carcinoma      Contact Information: Arrived alone for biopsy results. Dr. Kathleen Swift discussed pathology and recommended integrated breast clinic. Encouraged Breast Clinic attendance. All cards given for surgeons and oncologists. Wants to see Dr(s): Undecided. Patient Expresses Need(s) For: Needing time to process the information. Requests Referral to Doctor(s) with appointment(s): Undecided    Additional Referral(s): Jaycee Hobson/Carolin Saldaña: Breast Navigators     Integrated breast cancer clinic appointment: Emerson Dye at this time. Artemio Hernandez will let Deirdre or Jaycee Norwood know tomorrow if she would like to attend clinic. Teaching Sheets provided: Cancer Type: Invasive Ductal Carcinoma, What is Breast Cancer, Tumor Markers, Needle Localization, [place] Lymph Node Surgery, Lumpectomy/Mastectomy Comparison, Radiation Therapy, Breast Cancer Navigation Packet, Nurse Navigation contact information, Breast Clinic appointment card and map. Currently on HRT: no    If yes, was patient instructed to stop immediately: N/A     Notes: Explained terms doctor and navigators will discuss at next appointments. Questions addressed and encouraged patient to ask Breast [facility]igators. Will receive call within 24 hrs. Referral emailed to Navigation. Answered yes on Genetic Risk Assessment: no     Additional information: Undecided on clinic at this time. Artemio Hernandez will let Deirdre or Jaycee Norwood know tomorrow if she would like to attend clinic.     Best contact number [phone] before 12:30 or after 2:30    Results Faxed to:  Dr. Lakisha Eckert

## 2022-01-12 ENCOUNTER — TELEPHONE (OUTPATIENT)
Dept: CASE MANAGEMENT | Age: 72
End: 2022-01-12

## 2022-01-12 ENCOUNTER — CLINICAL DOCUMENTATION (OUTPATIENT)
Dept: CASE MANAGEMENT | Age: 72
End: 2022-01-12

## 2022-01-12 NOTE — TELEPHONE ENCOUNTER
Name: Jesica José  : 1950  MRN: [id number]    Oncology Navigation Follow-Up Note    Contact Type:  Telephone    Notes: Coming today at 315 pm for initial breast cancer teaching with breast navigator. Verbalizes understanding of [facility] location and will bring education packet.     Electronically signed by Carolee Thomas RN on 2022 at 9:03 AM

## 2022-01-13 ENCOUNTER — TELEPHONE (OUTPATIENT)
Dept: CASE MANAGEMENT | Age: 72
End: 2022-01-13

## 2022-01-13 DIAGNOSIS — Z17.0 MALIGNANT NEOPLASM OF LOWER-OUTER QUADRANT OF RIGHT BREAST OF FEMALE, ESTROGEN RECEPTOR POSITIVE (HCC): Primary | ICD-10-CM

## 2022-01-13 DIAGNOSIS — C50.511 MALIGNANT NEOPLASM OF LOWER-OUTER QUADRANT OF RIGHT BREAST OF FEMALE, ESTROGEN RECEPTOR POSITIVE (HCC): Primary | ICD-10-CM

## 2022-01-13 NOTE — PROGRESS NOTES
Name: Kristi Billings  : 1950  MRN: [id number]    Oncology Navigation- Initial Note:    Intake-  Contact Type: Cancer Center-Initial breast cancer teaching    Diagnosis: Breast-malignant, right IDC, grade 1, receptors pending    Home Disposition: Lives with other who is able to assist, sister-Ellie Reid and [employer] listed as contacts. Patient needs and barriers to care: Coordination of Care, Knowledge deficit, Emotional Issues/ Fear/ Anxiety and Symptom Management-arthritis and multiple joint surgeries     Referral Source: Outside Provider    Receptive to Advanced Care Planning/ Palliative Care:  Deferred, stage pending    Interventions-   General Interventions: Arrived alone for initial breast cancer teaching with breast navigator. Offered breast clinic, patient declined. Genetics/Family history- Denies any cancers in family except 2 relatives with bone cancer. Education/Screenings:  yes - Breast Cancer Information Packet, Navigation packet, Breast Clinic, Pre-op: Lymphedema, exercises after breast surgery, pre-hab, breast MRI. Discused needle localization and magseed but explained the surgeon will determine which procedure will be ordered. Currently on HRT: no     Referrals: Oncology Rehab, Spiritual Care, Requesting Dr. Peterson Mode 2022 and Dr. Blanca Chen notified to call patient with 3 week post-op appt. Continuum of Care: Diagnosis/Active Treatment    Notes: Teaching completed and verbalizes understanding. Questions addressed. States had left shoulder replacement and bilateral hip replacements. Acknowledges she has decreased ROM right shoulder/arm when discussing radiation and benefits of Oncology Prehab/Rehab. Encouraged her to call anytime, has numbers. Will follow through continuum of care.     Electronically signed by Fabricio Kennedy RN on 2022 at 8:45 AM

## 2022-01-13 NOTE — TELEPHONE ENCOUNTER
Name: Marquis Walters  : 1950  MRN: [id number]    Oncology Navigation Follow-Up Note    Contact Type:  Telephone    Notes: Called receptor results to patient, ER+, MI+, HER2 - per pathology report. Completed teaching with her 2022 but results were still pending at that time. Verbalizes understanding of information and that will be recommended to take antiestrogen pill for her breast cancer. Seeing Rebel Schneider for surgical consult 2022.     Electronically signed by Isrrael Sunshine RN on 2022 at 8:40 AM

## 2022-01-14 ENCOUNTER — TELEPHONE (OUTPATIENT)
Dept: SURGERY | Age: 72
End: 2022-01-14

## 2022-01-14 ENCOUNTER — OFFICE VISIT (OUTPATIENT)
Dept: SURGERY | Age: 72
End: 2022-01-14
Payer: MEDICARE

## 2022-01-14 VITALS
RESPIRATION RATE: 18 BRPM | HEART RATE: 88 BPM | TEMPERATURE: 96.3 F | SYSTOLIC BLOOD PRESSURE: 130 MMHG | DIASTOLIC BLOOD PRESSURE: 88 MMHG | OXYGEN SATURATION: 92 % | WEIGHT: 190.8 LBS | BODY MASS INDEX: 29.95 KG/M2 | HEIGHT: 67 IN

## 2022-01-14 DIAGNOSIS — Z17.0 MALIGNANT NEOPLASM OF LOWER-OUTER QUADRANT OF RIGHT BREAST OF FEMALE, ESTROGEN RECEPTOR POSITIVE (HCC): Primary | ICD-10-CM

## 2022-01-14 DIAGNOSIS — Z01.818 PRE-OP TESTING: ICD-10-CM

## 2022-01-14 DIAGNOSIS — C50.511 MALIGNANT NEOPLASM OF LOWER-OUTER QUADRANT OF RIGHT BREAST OF FEMALE, ESTROGEN RECEPTOR POSITIVE (HCC): Primary | ICD-10-CM

## 2022-01-14 PROBLEM — C50.519 MALIGNANT NEOPLASM OF LOWER-OUTER QUADRANT OF BREAST OF FEMALE, ESTROGEN RECEPTOR POSITIVE (HCC): Status: ACTIVE | Noted: 2022-01-14

## 2022-01-14 PROCEDURE — G8427 DOCREV CUR MEDS BY ELIG CLIN: HCPCS | Performed by: SURGERY

## 2022-01-14 PROCEDURE — 4040F PNEUMOC VAC/ADMIN/RCVD: CPT | Performed by: SURGERY

## 2022-01-14 PROCEDURE — 1123F ACP DISCUSS/DSCN MKR DOCD: CPT | Performed by: SURGERY

## 2022-01-14 PROCEDURE — G8417 CALC BMI ABV UP PARAM F/U: HCPCS | Performed by: SURGERY

## 2022-01-14 PROCEDURE — 99205 OFFICE O/P NEW HI 60 MIN: CPT | Performed by: SURGERY

## 2022-01-14 PROCEDURE — 1090F PRES/ABSN URINE INCON ASSESS: CPT | Performed by: SURGERY

## 2022-01-14 PROCEDURE — 1036F TOBACCO NON-USER: CPT | Performed by: SURGERY

## 2022-01-14 PROCEDURE — G8399 PT W/DXA RESULTS DOCUMENT: HCPCS | Performed by: SURGERY

## 2022-01-14 PROCEDURE — 3017F COLORECTAL CA SCREEN DOC REV: CPT | Performed by: SURGERY

## 2022-01-14 PROCEDURE — G8484 FLU IMMUNIZE NO ADMIN: HCPCS | Performed by: SURGERY

## 2022-01-14 ASSESSMENT — ENCOUNTER SYMPTOMS
BLOOD IN STOOL: 0
APNEA: 0
CONSTIPATION: 0
EYE ITCHING: 0
EYE DISCHARGE: 0
CHOKING: 0
TROUBLE SWALLOWING: 0
CHEST TIGHTNESS: 0
NAUSEA: 0
BACK PAIN: 0
RECTAL PAIN: 0
ANAL BLEEDING: 0
VOMITING: 0
PHOTOPHOBIA: 0
COLOR CHANGE: 0
FACIAL SWELLING: 0
VOICE CHANGE: 0
ABDOMINAL DISTENTION: 0
EYE REDNESS: 0
COUGH: 0
EYE PAIN: 0
SINUS PRESSURE: 0
SHORTNESS OF BREATH: 0
ABDOMINAL PAIN: 0
RHINORRHEA: 0
STRIDOR: 0
DIARRHEA: 0
WHEEZING: 0
SORE THROAT: 0

## 2022-01-14 NOTE — PROGRESS NOTES
Subjective:      Patient ID: Vivi Pickett is a 80 y.o. female. Chief Complaint   Patient presents with    Surgical Consult     New patient-referred by [facility] breast invasive ductal carcinoma       HPI    Review of Systems   Constitutional: Negative for activity change, appetite change, chills, diaphoresis, fatigue, fever and unexpected weight change. HENT: Negative for congestion, dental problem, drooling, ear discharge, ear pain, facial swelling, hearing loss, mouth sores, nosebleeds, postnasal drip, rhinorrhea, sinus pressure, sneezing, sore throat, tinnitus, trouble swallowing and voice change. Eyes: Negative for photophobia, pain, discharge, redness, itching and visual disturbance. Respiratory: Negative for apnea, cough, choking, chest tightness, shortness of breath, wheezing and stridor. Cardiovascular: Negative for chest pain, palpitations and leg swelling. Gastrointestinal: Negative for abdominal distention, abdominal pain, anal bleeding, blood in stool, constipation, diarrhea, nausea, rectal pain and vomiting. Endocrine: Negative for cold intolerance, heat intolerance, polydipsia, polyphagia and polyuria. Genitourinary: Negative for decreased urine volume, difficulty urinating, dysuria, enuresis, flank pain, frequency, genital sores, hematuria and urgency. Musculoskeletal: Negative for arthralgias, back pain, gait problem, joint swelling, myalgias, neck pain and neck stiffness. Skin: Negative for color change, pallor, rash and wound. Allergic/Immunologic: Negative for environmental allergies, food allergies and immunocompromised state. Neurological: Negative for dizziness, tremors, seizures, syncope, facial asymmetry, speech difficulty, weakness, light-headedness, numbness and headaches. Hematological: Negative for adenopathy. Does not bruise/bleed easily.    Psychiatric/Behavioral: Negative for agitation, behavioral problems, confusion, decreased concentration, dysphoric mood, hallucinations, self-injury, sleep disturbance and suicidal ideas. The patient is not nervous/anxious and is not hyperactive.       /88 (Site: Right Upper Arm, Position: Sitting, Cuff Size: Medium Adult)   Pulse 88   Temp 96.3 °F (35.7 °C) (Temporal)   Resp 18   Ht 5' 7\" (1.702 m)   Wt 190 lb 12.8 oz (86.5 kg)   SpO2 92%   BMI 29.88 kg/m²     Objective:   Physical Exam    Assessment:            Plan:              Jorge Alvarado LPN

## 2022-01-14 NOTE — PROGRESS NOTES
Ora Villafuerte MD MD    General Surgery  New Patient Evaluation in Office  Pt Name: Marycruz Estrada  Date of Birth 1950   Today's Date: 1/17/2022  Medical Record Number: [id number]  Referring Provider: No ref. provider found  Primary Care Provider: Leory Spurling, MD  Chief Complaint   Patient presents with    Surgical Consult     New patient-referred by [facility] breast invasive ductal carcinoma     ASSESSMENT      Problem List Items Addressed This Visit     Malignant neoplasm of lower-outer quadrant of breast of female, estrogen receptor positive ([facility] [address] - Primary      Other Visit Diagnoses     Pre-op testing            Past Medical History:   Diagnosis Date    Arthritis     Borderline hyperlipidemia     Diabetic ulcer of toe of left foot associated with type 2 diabetes mellitus, limited to breakdown of skin ([facility] [address] 08/31/2020    Hypertension     Invasive ductal carcinoma of breast ([facility] [address] 01/2022    right breast    Type II or unspecified type diabetes mellitus without mention of complication, not stated as uncontrolled     prediabetes-no meds          Cancer Staging  Malignant neoplasm of lower-outer quadrant of breast of female, estrogen receptor positive ([facility] [address]  Staging form: Breast, AJCC 8th Edition  - Clinical stage from 1/14/2022: Stage IA (cT1a, cN0, cM0, G1, ER+, LA+, HER2-) - Signed by Ora Villafuerte MD on 1/14/2022      PLANS      1. Schedule [facility] for  1. Right lumpectomy with preoperative wire localization  2. Will need oncoytpe Dx if final path greater than 1cm in size   3. Referral to oncology for hormonal therapy   4. Referral to rad onc, although with age may be able to omit radiation   2. In the office, I had a discussion with the patient regarding the treatment options for invasive breast cancer. We discussed lumpectomy and radiation versus mastectomy.  We discussed the fact that there is no statistical difference in long term survival or recurrence between the two options. 3. Candidate for Lumpectomy YES/NO: Yes  4. Candidate for omission of sln bx YES/NO: Yes  5. Candidate for potential omission of radiation YES/NO: Yes  6. We had a long discussion in the office regarding treatment options for breast cancer. We discussed lumpectomy and radiation versus simple mastectomy with or without reconstruction. For lumpectomy, we discussed the conduct of the operation, types of anesthesia available, possible use of needle localization preop and expected postop recovery and cosmetic outcome. We also discussed the risk of recurrence after lumpectomy and radiation estimated to be 9-11% of which 50% of the recurrence is non-invasive breast cancer and the other 50% is invasive cancer. We covered radiation therapy, its typical course and average number of treatments required, as well as possible side effects. In addition, we discussed treatment if recurrence developed which is mastectomy. Finally we discussed the results of NSABP B-24 which showed a 50% reduction in local recurrence with the use of adjuvant tamoxifen taken for 5 years after radiation. We also covered simple mastectomy. The conduct of the operation, use of general anesthesia, the expected postop recovery and cosmetic outcome with and without reconstruction. The recurrence rate of 1% was estimated as the risk of recurrence. After this discussion, the patient's questions were answered and has decided to proceed with surgical intervention. 7. For lumpectomy, we covered the conduct of the operation, the possible use of needle localization preoperatively, the anesthetic options, the typical post op course and cosmetic outcome, and the complications including bleeding, infection, seroma, and reoperation for positive margins. 8. For mastectomy, we covered the conduct of the operation, the use of general anesthesia, the typical post op course and cosmetic outcome.  We also covered reconstruction options both immediate and delayed and referral was made if the patient desired reconstruction, or the use of a bra prosthesis. We also covered the possible complications including bleeding, infection, skin slough, seroma formation and others. 9. We also discussed staging and the importance of lymph node sampling. We discussed the use of sentinel lymph node biopsy versus standard axillary lymph node dissection. We discussed the complications of axillary lymph node dissection and how the information is used in treatment decisions and prognosis. We discussed how the sentinel lymph node is identified and its significance. We discussed the possibility of not finding the sentinel node as well as a 5% false negative rate. 10.  We discussed the ACOSOG  Z011 trial where even if sln is positive, no benefit for further axillary surgery has been shown in lumpectomy in patients getting radiation. 11. We also covered adjuvant chemotherapy and radiation therapy if they would be required. After these discussions, the patient's questions were answered and has elected to proceed with surgery. 12. Her need for genetic testing referral was calculated by questions asked during her mammograms and she was is not deemed to be a candidate for testing. For those who are deemed appropriate, referral to the genetic counseling service at [address] [place] [address] was made. [facility] meets the following criteria for further genetic risk evaluation based on NCCN guidelines:   Breast Cancer diagnosed age 48 years or younger.  Triple-negative (ER-, HI-, HER2-) breast cancer diagnosed age 61 years or younger.  Two breast cancer primaries   Breast cancer at any age and   o 3 or more close blood relatives with:  - Breast cancer diagnosis at age 48 years or younger; or  - Invasive ovarian cancer; or  - Male breast cancer; or  - Pancreatic cancer; or  - High-grade (Philadelphia score greater than or equal to 7) or metastatic prostate cancer.   o 2 or more close blood relatives with breast cancer at any age.  Lobular breast cancer.  Diffuse gastric cancer   Breast cancer, gastrointestinal cancer, or hamartomatous polyps, ovarian sex chord tumors, pancreatic cancer, testicular sertoli cell tumors, or childhood skin pigmentation. 14.   15.   16.    17. Status: outpatient  18. Planned anesthesia: MAC  19. She will undergo pre-operative clearance per anesthesia guidelines with risk factors listed under the past medical history diagnosis & problem list.  13.  Perioperative discontinuation of        ASA, Plavix, warfarin, Brillinta, Effient, Pradaxa, Eliquis and Xarelto. Continuation of 81 mg Aspirin is acceptable. 14.  Perioperative medical clearance is not         Orders Placed This Encounter:  No orders of the defined types were placed in this encounter. Renee Mckeon is a 80 y.o.  female seen in the office for evaluation of breast cancer. She was referred for evaluation and discussion of treatment options for carcinoma of the right LOQ. she is had surgical biopsy,   demonstrating a low grade invasive ductal cancer of the right breast. Estrogen receptor status is positive. Progesterone receptor status is positive. HER-2/nicole receptors negative . Prior to the biopsy she complained of no breast symptoms and denied nipple changes and nipple discharge.   Risk assessment:   Onset of mesnse at at 14  Onset of menopause at 52ish  Fist child at age of 26  No hormonal therapy     Past Medical History  Past Medical History:   Diagnosis Date    Arthritis     Borderline hyperlipidemia     Diabetic ulcer of toe of left foot associated with type 2 diabetes mellitus, limited to breakdown of skin ([facility] [address] 08/31/2020    Hypertension     Invasive ductal carcinoma of breast ([facility] [address] 01/2022    right breast    Type II or unspecified type diabetes mellitus without mention of complication, not stated as uncontrolled     prediabetes-no meds       Past Surgical History  Past Surgical History:   Procedure Laterality Date    JOINT REPLACEMENT Bilateral     sanko    JOINT REPLACEMENT Bilateral     sanko    SHELL US GUID NDL BIOPSY RIGHT Right 01/10/2022    SHELL US GUID NDL BIOPSY RIGHT 1/10/2022 BENITA [address]    SHOULDER ARTHROPLASTY Left     [place]      Family History  Family History   Problem Relation Age of Onset    Arthritis Mother     Heart Disease Father     High Blood Pressure Father     Diabetes Father     No Known Problems Maternal Grandmother     Cancer Maternal Grandfather         bone    No Known Problems Paternal Grandmother     No Known Problems Paternal Grandfather     Cancer Maternal Aunt         bone     Cancer-related family history includes Cancer in her maternal aunt and maternal grandfather. Medications  Outpatient Encounter Medications as of 1/14/2022   Medication Sig Dispense Refill    lisinopril-hydroCHLOROthiazide (PRINZIDE;ZESTORETIC) 20-12.5 MG per tablet Take 1 tablet by mouth 2 times daily 180 tablet 3    celecoxib (CELEBREX) 200 MG capsule Take 1 capsule by mouth 2 times daily 180 capsule 3    atenolol (TENORMIN) 50 MG tablet Take 1 tablet by mouth daily 90 tablet 3    traZODone (DESYREL) 50 MG tablet Take 1 tablet by mouth nightly as needed for Sleep 30 tablet 5    simvastatin (ZOCOR) 10 MG tablet Take 1 tablet by mouth nightly 90 tablet 3    Handicap Placard MISC by Does not apply route EXPIRES 5 YEARS 1 each 0    therapeutic multivitamin-minerals (THERAGRAN-M) tablet Take 1 tablet by mouth daily.  traMADol (ULTRAM) 50 MG tablet Take 1 tablet by mouth every 6 hours as needed for Pain for up to 90 days. 60 tablet 5    [DISCONTINUED] conjugated estrogens (PREMARIN) 0.625 MG/GM vaginal cream Place vaginally daily. As needed. 1 Tube 3     No facility-administered encounter medications on file as of 1/14/2022.      Current Outpatient Medications   Medication Sig Dispense Refill    lisinopril-hydroCHLOROthiazide (PRINZIDE;ZESTORETIC) 20-12.5 MG per tablet Take 1 tablet by mouth 2 times daily 180 tablet 3    celecoxib (CELEBREX) 200 MG capsule Take 1 capsule by mouth 2 times daily 180 capsule 3    atenolol (TENORMIN) 50 MG tablet Take 1 tablet by mouth daily 90 tablet 3    traZODone (DESYREL) 50 MG tablet Take 1 tablet by mouth nightly as needed for Sleep 30 tablet 5    simvastatin (ZOCOR) 10 MG tablet Take 1 tablet by mouth nightly 90 tablet 3    Handicap Placard MISC by Does not apply route EXPIRES 5 YEARS 1 each 0    therapeutic multivitamin-minerals (THERAGRAN-M) tablet Take 1 tablet by mouth daily.  traMADol (ULTRAM) 50 MG tablet Take 1 tablet by mouth every 6 hours as needed for Pain for up to 90 days. 60 tablet 5     No current facility-administered medications for this visit. Allergies  No Known Allergies    Social History  Social History     Socioeconomic History    Marital status:      Spouse name: Not on file    Number of children: Not on file    Years of education: Not on file    Highest education level: Not on file   Occupational History    Not on file   Tobacco Use    Smoking status: Never Smoker    Smokeless tobacco: Never Used   Substance and Sexual Activity    Alcohol use: Yes     Comment: very rare    Drug use: Not on file    Sexual activity: Not on file   Other Topics Concern    Not on file   Social History Narrative    Not on file     Social Determinants of Health     Financial Resource Strain:     Difficulty of Paying Living Expenses: Not on file   Food Insecurity:     Worried About Running Out of Food in the Last Year: Not on file    Arnold of Food in the Last Year: Not on file   Transportation Needs:     Lack of Transportation (Medical): Not on file    Lack of Transportation (Non-Medical):  Not on file   Physical Activity:     Days of Exercise per Week: Not on file    Minutes of Exercise per Session: Not on file   Stress:     Feeling of Stress : Not on file Social Connections:     Frequency of Communication with Friends and Family: Not on file    Frequency of Social Gatherings with Friends and Family: Not on file    Attends Zoroastrianism Services: Not on file    Active Member of [address] [place] [address] or Organizations: Not on file    Attends Club or Organization Meetings: Not on file    Marital Status: Not on file   Intimate Partner Violence:     Fear of Current or Ex-Partner: Not on file    Emotionally Abused: Not on file    Physically Abused: Not on file    Sexually Abused: Not on file   Housing Stability:     Unable to Pay for Housing in the Last Year: Not on file    Number of [place] in the Last Year: Not on file    Unstable Housing in the Last Year: Not on [employer]   Topic Date Due    Shingles Vaccine (1 of 2) Never done    Diabetic retinal exam  08/23/2017    Annual Wellness Visit (AWV)  01/16/2022    Depression Screen  01/15/2022    Colon Cancer Screen FIT/FOBT  02/04/2022    A1C test (Diabetic or Prediabetic)  10/12/2022    Diabetic microalbuminuria test  10/12/2022    Lipid screen  10/12/2022    Potassium monitoring  10/12/2022    Creatinine monitoring  10/12/2022    Diabetic foot exam  10/14/2022    Breast cancer screen  01/10/2023    DTaP/Tdap/Td vaccine (2 - Td or Tdap) 08/11/2023    DEXA (modify frequency per FRAX score)  Completed    Flu vaccine  Completed    Pneumococcal 65+ years Vaccine  Completed    COVID-19 Vaccine  Completed    Hepatitis A vaccine  Aged Out    Hib vaccine  Aged Out    Meningococcal (ACWY) vaccine  Aged Out    Hepatitis C screen  Discontinued       Review of Systems  Constitutional: Negative for activity change, appetite change, chills, diaphoresis, fatigue, fever and unexpected weight change.    HENT: Negative for congestion, dental problem, drooling, ear discharge, ear pain, facial swelling, hearing loss, mouth sores, nosebleeds, postnasal drip, rhinorrhea, sinus pressure, sneezing, sore throat, tinnitus, trouble swallowing and voice change. Eyes: Negative for photophobia, pain, discharge, redness, itching and visual disturbance. Respiratory: Negative for apnea, cough, choking, chest tightness, shortness of breath, wheezing and stridor. Cardiovascular: Negative for chest pain, palpitations and leg swelling. Gastrointestinal: Negative for abdominal distention, abdominal pain, anal bleeding, blood in stool, constipation, diarrhea, nausea, rectal pain and vomiting. Endocrine: Negative for cold intolerance, heat intolerance, polydipsia, polyphagia and polyuria. Genitourinary: Negative for decreased urine volume, difficulty urinating, dysuria, enuresis, flank pain, frequency, genital sores, hematuria and urgency. Musculoskeletal: Negative for arthralgias, back pain, gait problem, joint swelling, myalgias, neck pain and neck stiffness. Skin: Negative for color change, pallor, rash and wound. Allergic/Immunologic: Negative for environmental allergies, food allergies and immunocompromised state. Neurological: Negative for dizziness, tremors, seizures, syncope, facial asymmetry, speech difficulty, weakness, light-headedness, numbness and headaches. Hematological: Negative for adenopathy. Does not bruise/bleed easily. Psychiatric/Behavioral: Negative for agitation, behavioral problems, confusion, decreased concentration, dysphoric mood, hallucinations, self-injury, sleep disturbance and suicidal ideas. The patient is not nervous/anxious and is not hyperactive. OBJECTIVE    VITALS:  Vitals:    01/14/22 1003   BP: 130/88   Pulse: 88   Resp: 18   Temp: 96.3 °F (35.7 °C)   SpO2: 92%     Physical Exam  Vitals reviewed. Constitutional:       Appearance: Normal appearance. She is well-developed. HENT:      Head: Normocephalic and atraumatic. Mouth/Throat:      Mouth: Mucous membranes are moist.      Pharynx: No oropharyngeal exudate.    Eyes: General: No scleral icterus. Extraocular Movements: Extraocular movements intact. Pupils: Pupils are equal, round, and reactive to light. Neck:      Thyroid: No thyromegaly. Trachea: No tracheal deviation. Cardiovascular:      Rate and Rhythm: Normal rate and regular rhythm. Pulses: Normal pulses. Pulmonary:      Effort: Pulmonary effort is normal.      Breath sounds: Normal breath sounds. Chest:   Breasts:      Right: Tenderness present. No swelling, bleeding, inverted nipple, mass, nipple discharge, skin change, axillary adenopathy or supraclavicular adenopathy. Left: Normal. No swelling, bleeding, inverted nipple, mass, nipple discharge, skin change, tenderness, axillary adenopathy or supraclavicular adenopathy. Comments: brusing to right breast from biopsy   Abdominal:      Palpations: Abdomen is soft. Tenderness: There is no abdominal tenderness. There is no guarding. Hernia: No hernia is present. Musculoskeletal:         General: No deformity. Normal range of motion. Cervical back: Normal range of motion and neck supple. Lymphadenopathy:      Upper Body:      Right upper body: No supraclavicular, axillary or pectoral adenopathy. Left upper body: No supraclavicular, axillary or pectoral adenopathy. Skin:     General: Skin is warm. Findings: No rash. Neurological:      Mental Status: She is alert and oriented to person, place, and time. Psychiatric:         Mood and Affect: Mood normal.         Speech: Speech normal.         Behavior: Behavior normal.         Thought Content: Thought content normal.             PATHOLOGY:  Clinical Information: RT BREAST     ADDENDUM REPORT (1/12/2022): FINAL DIAGNOSIS:   Breast, right, 700+2, tribell clip, core needle biopsies:             Well differentiated invasive ductal carcinoma, Livia   score 1 of 3.      BREAST BIOMARKERS*  (1/12/2022)   Estrogen Receptor: (Clone SP1), [employer]       ( x ) Positive 95% of cells (>10% of cells)   Intensity of Staining:       ( x ) Strong     Progesterone Receptor: (Clone 1E2), [employer]       ( x ) Positive 100% of cells   Intensity of Staining:       ( x ) Strong     Ki-67 (clone 30-9)       Percentage of positive nuclei:  15%               Favorable <10%               Borderline 10-20%               Unfavorable >20%          2018 ASCO/ CAP   Inform HER2 Dual MALAIKA        HER2 dual MALAIKA    [employer]        Group #   ( x  Group 5:         HER2/CEP17 Ratio <2.0 and <4.0 HER2    )                    signals/cell                         HER2 MALAIKA NEGATIVE                           Number of observers:  1                         Number of invasive tumor cells counted: 20                         Using dual probe assay:                          Average number of HER2 signals per cell:   2.8                          Average number of CEP17 signals per cell:  2                          HER2/CEP17 ratio:  1.4       External Controls:  ( x )  Adequate    (  ) Inadequate   Internal Controls:  ( x )  Adequate     (  ) No internal control (ER is   0 - <10%).  Recommend repeat testing on another specimen. Standard Assay Conditions:  ( x )  Met   (  ) Not Met (Cold ischemic   time>1 hr., and/or fixation time<6 or>72 hrs. for hormone receptors). Staining Method Used:    Formalin fixation    Antigen retrieval type:  Cell Conditioning 1, mild kennedi    Time in antigen retrieval:  30 minutes    Detection system type:   DAB Ultraview kit       Specimen:   CORE NEEDLE BREAST BIOPSY, RT MASS 700 +2 TRIBELL CLIP       Gross Examination:   The container is labeled Kayla Wallser, right, 700+2, lower outer,   tribell.  Received in formalin are multiple fragments of yellow-white   fibroadipose tissue aggregating to 1.2 x 0.9 x 0.1 cm.  1 ns.    ALP/DKR:[id number]     Fixative:  10% neutral buffered formalin   Tissue removal time:  09:39   Tissue fixation time:  09:40   Total fixation time: 10 hours, 20 minutes     Microscopic Examination:   Histologic Type: Invasive ductal   Histologic grade:               Tubule Formation: > 75%               Nuclear Grade: II               Mitotic Count: 0-5/10 HPF               Livia Score: I (3-5 pts)   Tumor Size: The largest focus of tumor spans 0.4 cm. Angiolymphatic invasion: Absent   DCIS: Absent   Other: An immunoperoxidase stain for p63* is performed with adequate   controls. Bonifacio Fuelling is an absence of a myoepithelial cell layer in the   areas of invasive tumor.  Microcalcifications are present within   invasive tumor. RADIOLOGY:         In the lower outer right breast at 7:00, 2 cm from the nipple, there is a 4 x 3 x 5 mm hypoechoic mass. This has spiculated margins. There is an echogenic rim. There is some associated color flow. This corresponds to the mammographic finding.       There are no sonographic abnormalities in the axilla. A normal lymph node is seen.            IMPRESSION:   5 mm spiculated mass in the lower outer right breast. An ultrasound-guided biopsy is recommended.       An ultrasound-guided biopsy is recommended.       These results were discussed with the patient. The tech navigator was notified. We will arrange scheduling the patient for her procedure per department protocol.                 Electronically signed by Tiff Khan MD on 1/17/2022 at 10:41 AM    The patients records and films were reviewed independently. Time was given for questions . All questions were answered to the patients satisfaction. A total time of 90 minutes  was spent with at least 51% related to counseling and coordination of care.

## 2022-01-14 NOTE — TELEPHONE ENCOUNTER
Patient scheduled for surgery with Dr. Mike Rodriguez on 01- at 12:30pm with an arrival of 9:00am at [facility]. Preop orders and instructions given to patient. Surgery consent signed. Antibacterial soap given.

## 2022-01-14 NOTE — TELEPHONE ENCOUNTER
[address]    Phone * [phone]   Surgical Scheduling Direct line Phone * [phone]  Fax * [phone]      Kristi Grade      1950    female    [address] [place] [address]   Marital Status:         Home Phone: [phone]   Cell Phone:   Telephone Information:   Mobile [phone]              Surgeon: Dr. Josefina Freeman  Surgery Date:01-   Time: DARLEEN Lora     Procedure: Right lumpectomy with preop operative needle localization   Outpatient     Diagnosis: Right breast cancer    Important Medical History: In Epic    Special Inst/Equip:     CPT Codes: 71480    Latex Allergy:   no Cardiac Device:  no    Anesthesia Type: MAC    Case Location:  Main OR     Preadmission Testing: Phone Call      PAT Date and Time: ________________________________    PAT Confirmation #: _________________________________    Post Op Visit:  ______________________________________    Need Preop Cardiac Clearance:   no    Does patient have Cardiologist/physician?  No      Surgery Conformation #:  ______________________________________________    : __________________________________ Date:____________________        [employer] Name:  Leslie Paiz

## 2022-01-17 ENCOUNTER — HOSPITAL ENCOUNTER (OUTPATIENT)
Age: 72
Discharge: HOME | End: 2022-01-17
Payer: MEDICARE

## 2022-01-17 LAB
EKG ATRIAL RATE: 74 BPM
EKG P AXIS: 79 DEGREES
EKG P-R INTERVAL: 190 MS
EKG Q-T INTERVAL: 368 MS
EKG QRS DURATION: 92 MS
EKG QTC CALCULATION (BAZETT): 408 MS
EKG R AXIS: 50 DEGREES
EKG T AXIS: 69 DEGREES
EKG VENTRICULAR RATE: 74 BPM
HCT VFR BLD CALC: 40.2 % (ref 37–47)
HEMOGLOBIN: 13.3 GM/DL (ref 12–16)

## 2022-01-17 PROCEDURE — 36415 COLL VENOUS BLD VENIPUNCTURE: CPT

## 2022-01-17 PROCEDURE — 85018 HEMOGLOBIN: CPT

## 2022-01-17 PROCEDURE — 93005 ELECTROCARDIOGRAM TRACING: CPT | Performed by: SURGERY

## 2022-01-17 PROCEDURE — 85014 HEMATOCRIT: CPT

## 2022-01-17 PROCEDURE — 93010 ELECTROCARDIOGRAM REPORT: CPT | Performed by: NUCLEAR MEDICINE

## 2022-01-18 ENCOUNTER — TELEPHONE (OUTPATIENT)
Dept: SURGERY | Age: 72
End: 2022-01-18

## 2022-01-18 ENCOUNTER — TELEPHONE (OUTPATIENT)
Dept: ONCOLOGY | Age: 72
End: 2022-01-18

## 2022-01-18 ENCOUNTER — PREP FOR PROCEDURE (OUTPATIENT)
Dept: SURGERY | Age: 72
End: 2022-01-18

## 2022-01-18 DIAGNOSIS — I49.8 SINUS ARRHYTHMIA: ICD-10-CM

## 2022-01-18 DIAGNOSIS — Z01.818 PREOPERATIVE EXAMINATION: ICD-10-CM

## 2022-01-18 DIAGNOSIS — R94.31 ABNORMAL EKG: Primary | ICD-10-CM

## 2022-01-18 RX ORDER — SODIUM CHLORIDE 9 MG/ML
INJECTION, SOLUTION INTRAVENOUS CONTINUOUS
Status: CANCELLED | OUTPATIENT
Start: 2022-01-18

## 2022-01-18 NOTE — TELEPHONE ENCOUNTER
CALLED AND SPOKE WITH THE PATIENT ABOUT SCHEDULING A NEW PATIENT APPT WITH [facility] AFTER SURGERY. I TOLD THE PATIENT THAT HER APPT IS SCHEDULED FOR 2/9 @2P.

## 2022-01-18 NOTE — TELEPHONE ENCOUNTER
Okay for surgery but Echo has been ordered to follow up on EKG. Last visit was 10/2021 HTN was high, now seems better. H/o SVT.   Daily walker and active in general.

## 2022-01-18 NOTE — TELEPHONE ENCOUNTER
Patient scheduled for right breast lumpectomy with Dr Landon Juarez on 1/19/2022 newly diagnosed breast cancer EKG done are you ok to proceed and clear from your standpoint? This is under MAC anesthesia. Thank you.

## 2022-01-19 ENCOUNTER — HOSPITAL ENCOUNTER (OUTPATIENT)
Dept: WOMENS IMAGING | Age: 72
Discharge: HOME | End: 2022-01-19
Payer: MEDICARE

## 2022-01-19 ENCOUNTER — HOSPITAL ENCOUNTER (OUTPATIENT)
Age: 72
Setting detail: OUTPATIENT SURGERY
Discharge: HOME | End: 2022-01-19
Attending: SURGERY | Admitting: SURGERY
Payer: MEDICARE

## 2022-01-19 ENCOUNTER — ANESTHESIA (OUTPATIENT)
Dept: OPERATING ROOM | Age: 72
End: 2022-01-19
Payer: MEDICARE

## 2022-01-19 ENCOUNTER — ANESTHESIA EVENT (OUTPATIENT)
Dept: OPERATING ROOM | Age: 72
End: 2022-01-19
Payer: MEDICARE

## 2022-01-19 ENCOUNTER — HOSPITAL ENCOUNTER (OUTPATIENT)
Dept: WOMENS IMAGING | Age: 72
Discharge: HOME | End: 2022-01-19
Attending: SURGERY
Payer: MEDICARE

## 2022-01-19 VITALS
WEIGHT: 189.8 LBS | HEART RATE: 66 BPM | DIASTOLIC BLOOD PRESSURE: 74 MMHG | HEIGHT: 68 IN | SYSTOLIC BLOOD PRESSURE: 156 MMHG | BODY MASS INDEX: 28.76 KG/M2 | OXYGEN SATURATION: 100 % | TEMPERATURE: 96.3 F | RESPIRATION RATE: 16 BRPM

## 2022-01-19 VITALS — DIASTOLIC BLOOD PRESSURE: 83 MMHG | SYSTOLIC BLOOD PRESSURE: 152 MMHG | OXYGEN SATURATION: 98 %

## 2022-01-19 DIAGNOSIS — C50.511 MALIGNANT NEOPLASM OF LOWER-OUTER QUADRANT OF RIGHT BREAST OF FEMALE, ESTROGEN RECEPTOR POSITIVE (HCC): Primary | ICD-10-CM

## 2022-01-19 DIAGNOSIS — Z17.0 MALIGNANT NEOPLASM OF LOWER-OUTER QUADRANT OF RIGHT BREAST OF FEMALE, ESTROGEN RECEPTOR POSITIVE (HCC): ICD-10-CM

## 2022-01-19 DIAGNOSIS — C50.511 MALIGNANT NEOPLASM OF LOWER-OUTER QUADRANT OF RIGHT BREAST OF FEMALE, ESTROGEN RECEPTOR POSITIVE (HCC): ICD-10-CM

## 2022-01-19 DIAGNOSIS — Z17.0 MALIGNANT NEOPLASM OF LOWER-OUTER QUADRANT OF RIGHT BREAST OF FEMALE, ESTROGEN RECEPTOR POSITIVE (HCC): Primary | ICD-10-CM

## 2022-01-19 LAB — POTASSIUM SERPL-SCNC: 5.2 MEQ/L (ref 3.5–5.2)

## 2022-01-19 PROCEDURE — 3700000001 HC ADD 15 MINUTES (ANESTHESIA): Performed by: SURGERY

## 2022-01-19 PROCEDURE — 77065 DX MAMMO INCL CAD UNI: CPT

## 2022-01-19 PROCEDURE — 6370000000 HC RX 637 (ALT 250 FOR IP): Performed by: SURGERY

## 2022-01-19 PROCEDURE — 6360000002 HC RX W HCPCS

## 2022-01-19 PROCEDURE — 36415 COLL VENOUS BLD VENIPUNCTURE: CPT

## 2022-01-19 PROCEDURE — 84132 ASSAY OF SERUM POTASSIUM: CPT

## 2022-01-19 PROCEDURE — 7100000010 HC PHASE II RECOVERY - FIRST 15 MIN: Performed by: SURGERY

## 2022-01-19 PROCEDURE — 7100000011 HC PHASE II RECOVERY - ADDTL 15 MIN: Performed by: SURGERY

## 2022-01-19 PROCEDURE — 3700000000 HC ANESTHESIA ATTENDED CARE: Performed by: SURGERY

## 2022-01-19 PROCEDURE — 2580000003 HC RX 258

## 2022-01-19 PROCEDURE — 3600000012 HC SURGERY LEVEL 2 ADDTL 15MIN: Performed by: SURGERY

## 2022-01-19 PROCEDURE — 88307 TISSUE EXAM BY PATHOLOGIST: CPT

## 2022-01-19 PROCEDURE — 3600000002 HC SURGERY LEVEL 2 BASE: Performed by: SURGERY

## 2022-01-19 PROCEDURE — 19301 PARTIAL MASTECTOMY: CPT | Performed by: SURGERY

## 2022-01-19 PROCEDURE — 76098 X-RAY EXAM SURGICAL SPECIMEN: CPT

## 2022-01-19 PROCEDURE — 2500000003 HC RX 250 WO HCPCS: Performed by: SURGERY

## 2022-01-19 PROCEDURE — 2709999900 HC NON-CHARGEABLE SUPPLY: Performed by: SURGERY

## 2022-01-19 PROCEDURE — 2709999900 US PLACE BREAST LOC DEVICE 1ST LESION RIGHT

## 2022-01-19 RX ORDER — MIDAZOLAM HYDROCHLORIDE 1 MG/ML
INJECTION INTRAMUSCULAR; INTRAVENOUS PRN
Status: DISCONTINUED | OUTPATIENT
Start: 2022-01-19 | End: 2022-01-19 | Stop reason: SDUPTHER

## 2022-01-19 RX ORDER — HYDROCODONE BITARTRATE AND ACETAMINOPHEN 5; 325 MG/1; MG/1
1 TABLET ORAL EVERY 6 HOURS PRN
Qty: 12 TABLET | Refills: 0 | Status: SHIPPED | OUTPATIENT
Start: 2022-01-19 | End: 2022-01-22

## 2022-01-19 RX ORDER — BUPIVACAINE HYDROCHLORIDE AND EPINEPHRINE 5; 5 MG/ML; UG/ML
INJECTION, SOLUTION EPIDURAL; INTRACAUDAL; PERINEURAL PRN
Status: DISCONTINUED | OUTPATIENT
Start: 2022-01-19 | End: 2022-01-19 | Stop reason: ALTCHOICE

## 2022-01-19 RX ORDER — FENTANYL CITRATE 50 UG/ML
INJECTION, SOLUTION INTRAMUSCULAR; INTRAVENOUS PRN
Status: DISCONTINUED | OUTPATIENT
Start: 2022-01-19 | End: 2022-01-19 | Stop reason: SDUPTHER

## 2022-01-19 RX ORDER — SODIUM CHLORIDE 9 MG/ML
INJECTION, SOLUTION INTRAVENOUS CONTINUOUS
Status: DISCONTINUED | OUTPATIENT
Start: 2022-01-19 | End: 2022-01-19 | Stop reason: HOSPADM

## 2022-01-19 RX ORDER — PROPOFOL 10 MG/ML
INJECTION, EMULSION INTRAVENOUS CONTINUOUS PRN
Status: DISCONTINUED | OUTPATIENT
Start: 2022-01-19 | End: 2022-01-19 | Stop reason: SDUPTHER

## 2022-01-19 RX ORDER — CEFAZOLIN SODIUM 2 G/100ML
2000 INJECTION, SOLUTION INTRAVENOUS
Status: COMPLETED | OUTPATIENT
Start: 2022-01-19 | End: 2022-01-19

## 2022-01-19 RX ORDER — HYDROCODONE BITARTRATE AND ACETAMINOPHEN 5; 325 MG/1; MG/1
1 TABLET ORAL ONCE
Status: COMPLETED | OUTPATIENT
Start: 2022-01-19 | End: 2022-01-19

## 2022-01-19 RX ADMIN — FENTANYL CITRATE 50 MCG: 50 INJECTION, SOLUTION INTRAMUSCULAR; INTRAVENOUS at 16:34

## 2022-01-19 RX ADMIN — CEFAZOLIN SODIUM 2000 MG: 2 INJECTION, SOLUTION INTRAVENOUS at 16:34

## 2022-01-19 RX ADMIN — SODIUM CHLORIDE: 9 INJECTION, SOLUTION INTRAVENOUS at 10:44

## 2022-01-19 RX ADMIN — FENTANYL CITRATE 50 MCG: 50 INJECTION, SOLUTION INTRAMUSCULAR; INTRAVENOUS at 16:50

## 2022-01-19 RX ADMIN — MIDAZOLAM 2 MG: 1 INJECTION INTRAMUSCULAR; INTRAVENOUS at 16:34

## 2022-01-19 RX ADMIN — HYDROCODONE BITARTRATE AND ACETAMINOPHEN 1 TABLET: 5; 325 TABLET ORAL at 18:29

## 2022-01-19 RX ADMIN — PROPOFOL 50 MCG/KG/MIN: 10 INJECTION, EMULSION INTRAVENOUS at 16:36

## 2022-01-19 RX ADMIN — SODIUM CHLORIDE: 9 INJECTION, SOLUTION INTRAVENOUS at 16:59

## 2022-01-19 ASSESSMENT — PULMONARY FUNCTION TESTS
PIF_VALUE: 0
PIF_VALUE: 1
PIF_VALUE: 0

## 2022-01-19 ASSESSMENT — PAIN SCALES - GENERAL
PAINLEVEL_OUTOF10: 5
PAINLEVEL_OUTOF10: 4
PAINLEVEL_OUTOF10: 5
PAINLEVEL_OUTOF10: 0
PAINLEVEL_OUTOF10: 4

## 2022-01-19 NOTE — FLOWSHEET NOTE
Pt admitted to [facility] room 14 and oriented to unit. SCD sleeves applied. Nares swabbed. Pt verbalized permission for first name, last initial and physicians name on white board. SDS board and discharge criteria explained, pt and family verbalized understanding. Pt denies thoughts of harming self or others. Call light in reach. Sister and daughter All Licona at the bedside.

## 2022-01-19 NOTE — ANESTHESIA PRE PROCEDURE
Department of Anesthesiology  Preprocedure Note       Name:  Napoleon Srinivasan   Age:  80 y.o.  :  1950                                          MRN:  [id number]         Date:  2022      Surgeon: Emma Martin): Candice Guzman MD    Procedure: Procedure(s):  RIGHT LUMPECTOMY WITH PREOP OPERATIVE NEEDLE LOCALIZATION    Medications prior to admission:   Prior to Admission medications    Medication Sig Start Date End Date Taking? Authorizing Provider   lisinopril-hydroCHLOROthiazide (PRINZIDE;ZESTORETIC) 20-12.5 MG per tablet Take 1 tablet by mouth 2 times daily 21  Yes Jim Garces MD   traMADol (ULTRAM) 50 MG tablet Take 1 tablet by mouth every 6 hours as needed for Pain for up to 90 days. 10/14/21 1/19/22 Yes Jim Garces MD   celecoxib (CELEBREX) 200 MG capsule Take 1 capsule by mouth 2 times daily 10/14/21 4/11/22 Yes Jim Garces MD   atenolol (TENORMIN) 50 MG tablet Take 1 tablet by mouth daily 10/14/21 4/11/22 Yes Jim Garces MD   traZODone (DESYREL) 50 MG tablet Take 1 tablet by mouth nightly as needed for Sleep 10/14/21  Yes Jim Garces MD   simvastatin (ZOCOR) 10 MG tablet Take 1 tablet by mouth nightly 10/14/21  Yes Jim Garces MD   therapeutic multivitamin-minerals [facility]) tablet Take 1 tablet by mouth daily. Yes Historical Provider, MD   Handicap Placard MISC by Does not apply route EXPIRES 5 YEARS 1/15/21   Jim Garces MD   conjugated estrogens (PREMARIN) 0.625 MG/GM vaginal cream Place vaginally daily. As needed.  4/6/15 4/4/16  Manolo Florez MD       Current medications:    Current Facility-Administered Medications   Medication Dose Route Frequency Provider Last Rate Last Admin    0.9 % sodium chloride infusion   IntraVENous Continuous Darlene Villa  mL/hr at 87/73/93 1044 New Bag at 22 1044    ceFAZolin (ANCEF) 2000 mg in dextrose 4 % 100 mL IVPB (premix)  2,000 mg IntraVENous 30 Min Pre-Op [employer], LPN Allergies:  No Known Allergies    Problem List:    Patient Active Problem List   Diagnosis Code    SVT (supraventricular tachycardia) ([facility]) I47.1    Insomnia G47.00    Atrophic vaginitis N95.2    Type 2 diabetes mellitus without complication, without long-term current use of insulin ([facility]) E11.9    Arthritis M19.90    Essential hypertension I10    Pre-ulcerative calluses L84    Pure hypercholesterolemia E78.00    Malignant neoplasm of lower-outer quadrant of breast of female, estrogen receptor positive ([facility] C50.519, Z17.0       Past Medical History:        Diagnosis Date    Arthritis     Borderline hyperlipidemia     Breast cancer ([facility] 2022    right    Diabetic ulcer of toe of left foot associated with type 2 diabetes mellitus, limited to breakdown of skin ([facility] 08/31/2020    Hypertension     Invasive ductal carcinoma of breast ([facility] 01/2022    right breast    Type II or unspecified type diabetes mellitus without mention of complication, not stated as uncontrolled     prediabetes-no meds       Past Surgical History:        Procedure Laterality Date    JOINT REPLACEMENT Bilateral     sanko    JOINT REPLACEMENT Bilateral     sanko    [facility] US GUID NDL BIOPSY RIGHT Right 01/10/2022    [facility] US GUID NDL BIOPSY RIGHT 1/10/2022 [facility]    SHOULDER ARTHROPLASTY Left     [place]       Social History:    Social History     Tobacco Use    Smoking status: Never Smoker    Smokeless tobacco: Never Used   Substance Use Topics    Alcohol use: Yes     Comment: very rare                                Counseling given: Not Answered      Vital Signs (Current):   Vitals:    01/19/22 0951   BP: 136/71   Pulse: 96   Resp: 16   Temp: 97.2 °F (36.2 °C)   TempSrc: Temporal   SpO2: 99%   Weight: 189 lb 12.8 oz (86.1 kg)   Height: 5' 7.5\" (1.715 m)                                              BP Readings from Last 3 Encounters:   01/19/22 136/71   01/14/22 130/88   11/15/21 (!) 148/76       NPO Status: Time of last liquid consumption: 2230 (sip w meds this am)                        Time of last solid consumption: 2100                        Date of last liquid consumption: 01/18/22                        Date of last solid food consumption: 01/18/22    BMI:   Wt Readings from Last 3 Encounters:   01/19/22 189 lb 12.8 oz (86.1 kg)   01/14/22 190 lb 12.8 oz (86.5 kg)   10/14/21 187 lb (84.8 kg)     Body mass index is 29.29 kg/m². CBC:   Lab Results   Component Value Date    WBC 3.9 09/11/2020    RBC 4.38 09/11/2020    HGB 13.3 01/17/2022    HCT 40.2 01/17/2022    MCV 97.9 09/11/2020    RDW 13.4 02/01/2018     09/11/2020       CMP:   Lab Results   Component Value Date     10/12/2021    K 5.2 01/19/2022     10/12/2021    CO2 105 10/12/2021    BUN 14 10/12/2021    CREATININE 0.76 10/12/2021    LABGLOM 83 04/13/2021    GLUCOSE 156 10/12/2021    PROT 6.7 04/13/2021    CALCIUM 9.0 10/12/2021    BILITOT 0.5 10/12/2021    ALKPHOS 94 10/12/2021    AST 21 10/12/2021    ALT 21 10/12/2021       POC Tests: No results for input(s): POCGLU, POCNA, POCK, POCCL, POCBUN, POCHEMO, POCHCT in the last 72 hours.     Coags: No results found for: PROTIME, INR, APTT    HCG (If Applicable): No results found for: PREGTESTUR, PREGSERUM, HCG, HCGQUANT     ABGs: No results found for: PHART, PO2ART, WKU5CWB, IIZ9JZU, BEART, L9VDDWDO     Type & Screen (If Applicable):  Lab Results   Component Value Date    LABRH POS 04/08/2013       Drug/Infectious Status (If Applicable):  No results found for: HIV, HEPCAB    COVID-19 Screening (If Applicable): No results found for: COVID19        Anesthesia Evaluation  Patient summary reviewed and Nursing notes reviewed  Airway: Mallampati: II        Dental:          Pulmonary: breath sounds clear to auscultation                             Cardiovascular:  Exercise tolerance: good (>4 METS),   (+) hypertension:,       ECG reviewed  Rhythm: regular  Rate: normal Neuro/Psych:               GI/Hepatic/Renal:             Endo/Other:    (+) Diabetes, . Abdominal:       Abdomen: soft. Vascular: Other Findings:             Anesthesia Plan      MAC     ASA 2       Induction: intravenous. MIPS: Postoperative opioids intended and Prophylactic antiemetics administered. Anesthetic plan and risks discussed with patient. Plan discussed with CRNA.                   [facility],    1/19/2022

## 2022-01-19 NOTE — ANESTHESIA POSTPROCEDURE EVALUATION
Department of Anesthesiology  Postprocedure Note    Patient: Ngoc Montero  MRN: [id number]  YOB: 1950  Date of evaluation: 1/19/2022  Time:  6:34 PM     Procedure Summary     Date: 01/19/22 Room / Location: [facility] OR 06 / Ellen Camera    Anesthesia Start: 9598 Anesthesia Stop: 4291    Procedure: RIGHT LUMPECTOMY WITH PREOP OPERATIVE NEEDLE LOCALIZATION (Right Breast) Diagnosis: (RIGHT BREAST CANCER)    Surgeons: Mike Laws MD Responsible Provider: Miriam Soto DO    Anesthesia Type: MAC ASA Status: 2          Anesthesia Type: MAC    Isidro Phase I: Isidro Score: 10    Isidro Phase II:      Last vitals: Reviewed and per EMR flowsheets.        Anesthesia Post Evaluation    Patient location during evaluation: bedside  Patient participation: complete - patient participated  Level of consciousness: awake and alert  Pain score: 1  Airway patency: patent  Nausea & Vomiting: no nausea and no vomiting  Complications: no  Cardiovascular status: hemodynamically stable and blood pressure returned to baseline  Respiratory status: spontaneous ventilation, room air and acceptable  Hydration status: stable

## 2022-01-19 NOTE — BRIEF OP NOTE
Brief Postoperative Note      Patient: Juan M Jordan  YOB: 1950  MRN: [id number]    Date of Procedure: 1/19/2022    Pre-Op Diagnosis: RIGHT BREAST CANCER    Post-Op Diagnosis: same    Cancer Staging  Malignant neoplasm of lower-outer quadrant of breast of female, estrogen receptor positive ([facility]  Staging form: Breast, AJCC 8th Edition  - Clinical stage from 1/14/2022: Stage IA (cT1a, cN0, cM0, G1, ER+, NY+, HER2-) - Signed by Nedra French MD on 1/14/2022         Procedure(s):  RIGHT LUMPECTOMY WITH PREOP OPERATIVE NEEDLE LOCALIZATION    Surgeon(s):   Nedra French MD    Assistant:  * No surgical staff found *    Anesthesia: Monitor Anesthesia Care    Estimated Blood Loss (mL): Minimal    Complications: None    Specimens:   ID Type Source Tests Collected by Time Destination   A : Right Breast Mass Tissue Breast SURGICAL PATHOLOGY Nedra French MD 1/19/2022 1642    B : ADDITIONAL CAUDAL MARGINS, LONG LATERAL DEEP  AS MARKED, NEW MARGINS BLACK SILK Tissue Breast SURGICAL PATHOLOGY Nedra French MD 1/19/2022 1723        Implants:  * No implants in log *      Drains: * No LDAs found *    Findings: post lumpectomy mammogram of specimen with wire, clip and mass    Electronically signed by Nedra French MD on 1/19/2022 at 6:11 PM

## 2022-01-19 NOTE — PROGRESS NOTES
Patient in Same Day Surgery with family members present. Patient verified surgical and anesthesia consents. Patient arrived to OR with no hearing aides, dentures, jewelry, glasses or clothing.

## 2022-01-19 NOTE — PROGRESS NOTES
Contact Type: [facility]    Contact Information: Arrived with her sister and daughter for needle localization. Having breast surgery today with Dr. Rochelle Gayle . Diagnosis: IDC       Notes: Procedure explained and questions addressed as needed. Dr. Krish Fleming placed wire. Secured with gauze and tape per protocol. Transported patient with belongings to [address] [place] [address]. Surgery via wheelchair at [address] [place] [address]

## 2022-01-20 ENCOUNTER — TELEPHONE (OUTPATIENT)
Dept: SURGERY | Age: 72
End: 2022-01-20

## 2022-01-20 NOTE — PROGRESS NOTES

## 2022-01-20 NOTE — H&P
Wily Abel MD MD    General Surgery  New Patient Evaluation in Office  Pt Name: Jesica José  Date of Birth 1950   Today's Date: 1/20/2022  Medical Record Number: [id number]  Referring Provider: Pradip White MD  Primary Care Provider: Eli Cunningham MD  No chief complaint on file. ASSESSMENT      Problem List Items Addressed This Visit     Malignant neoplasm of lower-outer quadrant of breast of female, estrogen receptor positive ([facility] - Primary    Relevant Medications    HYDROcodone-acetaminophen (S[place]ore Sang) 5-325 MG per tablet        Past Medical History:   Diagnosis Date    Arthritis     Borderline hyperlipidemia     Breast cancer ([address] 2022    right    Diabetic ulcer of toe of left foot associated with type 2 diabetes mellitus, limited to breakdown of skin ([facility] 08/31/2020    Hypertension     Invasive ductal carcinoma of breast ([facility] [facility] 01/2022    right breast    Type II or unspecified type diabetes mellitus without mention of complication, not stated as uncontrolled     prediabetes-no meds          Cancer Staging  Malignant neoplasm of lower-outer quadrant of breast of female, estrogen receptor positive ([facility]  Staging form: Breast, AJCC 8th Edition  - Clinical stage from 1/14/2022: Stage IA (cT1a, cN0, cM0, G1, ER+, CO+, HER2-) - Signed by Wily Abel MD on 1/14/2022      PLANS      1. Schedule [place] for  1. Right lumpectomy with preoperative wire localization  2. Will need oncoytpe Dx if final path greater than 1cm in size   3. Referral to oncology for hormonal therapy   4. Referral to rad onc, although with age may be able to omit radiation   2. In the office, I had a discussion with the patient regarding the treatment options for invasive breast cancer. We discussed lumpectomy and radiation versus mastectomy. We discussed the fact that there is no statistical difference in long term survival or recurrence between the two options.   3. Candidate for Lumpectomy YES/NO: Yes  4. Candidate for omission of sln bx YES/NO: Yes  5. Candidate for potential omission of radiation YES/NO: Yes  6. We had a long discussion in the office regarding treatment options for breast cancer. We discussed lumpectomy and radiation versus simple mastectomy with or without reconstruction. For lumpectomy, we discussed the conduct of the operation, types of anesthesia available, possible use of needle localization preop and expected postop recovery and cosmetic outcome. We also discussed the risk of recurrence after lumpectomy and radiation estimated to be 9-11% of which 50% of the recurrence is non-invasive breast cancer and the other 50% is invasive cancer. We covered radiation therapy, its typical course and average number of treatments required, as well as possible side effects. In addition, we discussed treatment if recurrence developed which is mastectomy. Finally we discussed the results of NSABP B-24 which showed a 50% reduction in local recurrence with the use of adjuvant tamoxifen taken for 5 years after radiation. We also covered simple mastectomy. The conduct of the operation, use of general anesthesia, the expected postop recovery and cosmetic outcome with and without reconstruction. The recurrence rate of 1% was estimated as the risk of recurrence. After this discussion, the patient's questions were answered and has decided to proceed with surgical intervention. 7. For lumpectomy, we covered the conduct of the operation, the possible use of needle localization preoperatively, the anesthetic options, the typical post op course and cosmetic outcome, and the complications including bleeding, infection, seroma, and reoperation for positive margins. 8. For mastectomy, we covered the conduct of the operation, the use of general anesthesia, the typical post op course and cosmetic outcome.  We also covered reconstruction options both immediate and delayed and referral was made if the patient desired reconstruction, or the use of a bra prosthesis. We also covered the possible complications including bleeding, infection, skin slough, seroma formation and others. 9. We also discussed staging and the importance of lymph node sampling. We discussed the use of sentinel lymph node biopsy versus standard axillary lymph node dissection. We discussed the complications of axillary lymph node dissection and how the information is used in treatment decisions and prognosis. We discussed how the sentinel lymph node is identified and its significance. We discussed the possibility of not finding the sentinel node as well as a 5% false negative rate. 10.  We discussed the ACOSOG  Z011 trial where even if sln is positive, no benefit for further axillary surgery has been shown in lumpectomy in patients getting radiation. 11. We also covered adjuvant chemotherapy and radiation therapy if they would be required. After these discussions, the patient's questions were answered and has elected to proceed with surgery. 12. Her need for genetic testing referral was calculated by questions asked during her mammograms and she was is not deemed to be a candidate for testing. For those who are deemed appropriate, referral to the genetic counseling service at [address] [place] [address] was made. [facility] meets the following criteria for further genetic risk evaluation based on NCCN guidelines:   Breast Cancer diagnosed age 48 years or younger.  Triple-negative (ER-, SC-, HER2-) breast cancer diagnosed age 61 years or younger.  Two breast cancer primaries   Breast cancer at any age and   o 3 or more close blood relatives with:  - Breast cancer diagnosis at age 48 years or younger; or  - Invasive ovarian cancer; or  - Male breast cancer; or  - Pancreatic cancer; or  - High-grade (Bowers score greater than or equal to 7) or metastatic prostate cancer. o 2 or more close blood relatives with breast cancer at any age.  Lobular breast cancer.  Diffuse gastric cancer   Breast cancer, gastrointestinal cancer, or hamartomatous polyps, ovarian sex chord tumors, pancreatic cancer, testicular sertoli cell tumors, or childhood skin pigmentation. 14.   15.   16.    17. Status: outpatient  18. Planned anesthesia: MAC  19. She will undergo pre-operative clearance per anesthesia guidelines with risk factors listed under the past medical history diagnosis & problem list.  13.  Perioperative discontinuation of        ASA, Plavix, warfarin, Brillinta, Effient, Pradaxa, Eliquis and Xarelto. Continuation of 81 mg Aspirin is acceptable. 14.  Perioperative medical clearance is not         Orders Placed This Encounter:  Orders Placed This Encounter   Procedures    Potassium     Standing Status:   Standing     Number of Occurrences:   1    Surgical Pathology     Pre-op diagnosis:  RIGHT BREAST CANCER     Standing Status:   Standing     Number of Occurrences:   1    Surgical Pathology     Pre-op diagnosis:  RIGHT BREAST CANCER     Standing Status:   Standing     Number of Occurrences:   1    Surgical Pathology     Standing Status:   Standing     Number of Occurrences:   1    Discharge patient     Standing Status:   Standing     Number of Occurrences:   1     Order Specific Question:   Discharge Disposition     Answer:   Tedi Angelucci is a 80 y.o.  female seen in the office for evaluation of breast cancer. She was referred for evaluation and discussion of treatment options for carcinoma of the right LOQ. she is had surgical biopsy,   demonstrating a low grade invasive ductal cancer of the right breast. Estrogen receptor status is positive. Progesterone receptor status is positive. HER-2/nicole receptors negative . Prior to the biopsy she complained of no breast symptoms and denied nipple changes and nipple discharge.   Risk assessment:   Onset of mesnse at at 14  Onset of menopause at [address] child at age of 26  No hormonal therapy     Past Medical History  Past Medical History:   Diagnosis Date    Arthritis     Borderline hyperlipidemia     Breast cancer ([facility] 2022    right    Diabetic ulcer of toe of left foot associated with type 2 diabetes mellitus, limited to breakdown of skin ([facility] 08/31/2020    Hypertension     Invasive ductal carcinoma of breast ([facility] 01/2022    right breast    Type II or unspecified type diabetes mellitus without mention of complication, not stated as uncontrolled     prediabetes-no meds       Past Surgical History  Past Surgical History:   Procedure Laterality Date    BREAST LUMPECTOMY Right 1/19/2022    RIGHT LUMPECTOMY WITH PREOP OPERATIVE NEEDLE LOCALIZATION performed by Nedra French MD at / [facility] Bilateral     sanko    JOINT REPLACEMENT Bilateral     sanko    SHELL US GUID NDL BIOPSY RIGHT Right 01/10/2022    SHELL US GUID NDL BIOPSY RIGHT 1/10/2022 [facility]    SHOULDER ARTHROPLASTY Left     [place]    US GUIDED NEEDLE LOC OF RIGHT BREAST Right 1/19/2022    US GUIDED NEEDLE LOC OF RIGHT BREAST 1/19/2022 Cj Otero MD [facility]      Family History  Family History   Problem Relation Age of Onset    Arthritis Mother     Heart Disease Father     High Blood Pressure Father     Diabetes Father     No Known Problems Maternal Grandmother     Cancer Maternal Grandfather         bone    No Known Problems Paternal Grandmother     No Known Problems Paternal Grandfather     Cancer Maternal Aunt         bone     Cancer-related family history includes Cancer in her maternal aunt and maternal grandfather.   Medications  Outpatient Encounter Medications as of 1/14/2022   Medication Sig Dispense Refill    lisinopril-hydroCHLOROthiazide (PRINZIDE;ZESTORETIC) 20-12.5 MG per tablet Take 1 tablet by mouth 2 times daily 180 tablet 3    celecoxib (CELEBREX) 200 MG capsule Take 1 capsule by mouth 2 times daily 180 capsule 3    atenolol status:      Spouse name: Not on file    Number of children: Not on file    Years of education: Not on file    Highest education level: Not on file   Occupational History    Not on file   Tobacco Use    Smoking status: Never Smoker    Smokeless tobacco: Never Used   Vaping Use    Vaping Use: Never used   Substance and Sexual Activity    Alcohol use: Yes     Comment: very rare    Drug use: Not on file    Sexual activity: Not on file   Other Topics Concern    Not on file   Social History Narrative    Not on file     Social Determinants of Health     Financial Resource Strain:     Difficulty of Paying Living Expenses: Not on file   Food Insecurity:     Worried About Running Out of Food in the Last Year: Not on file    Arnold of Food in the Last Year: Not on file   Transportation Needs:     Lack of Transportation (Medical): Not on file    Lack of Transportation (Non-Medical):  Not on file   Physical Activity:     Days of Exercise per Week: Not on file    Minutes of Exercise per Session: Not on file   Stress:     Feeling of Stress : Not on file   Social Connections:     Frequency of Communication with Friends and Family: Not on file    Frequency of Social Gatherings with Friends and Family: Not on file    Attends Judaism Services: Not on file    Active Member of [address] [place] [address] [employer] or Organizations: Not on file    Attends Club or Organization Meetings: Not on file    Marital Status: Not on file   Intimate Partner Violence:     Fear of Current or Ex-Partner: Not on file    Emotionally Abused: Not on file    Physically Abused: Not on file    Sexually Abused: Not on file   Housing Stability:     Unable to Pay for Housing in the Last Year: Not on file    Number of [place] in the Last Year: Not on file    Unstable Housing in the Last Year: Not on [facility] Maintenance   Topic Date Due    Depression Screen  Never done    Shingles Vaccine (1 of 2) Never done  Diabetic retinal exam  08/23/2017    Annual Wellness Visit (AWV)  01/16/2022    Colon Cancer Screen FIT/FOBT  02/04/2022    A1C test (Diabetic or Prediabetic)  10/12/2022    Diabetic microalbuminuria test  10/12/2022    Lipid screen  10/12/2022    Creatinine monitoring  10/12/2022    Diabetic foot exam  10/14/2022    Breast cancer screen  01/19/2023    Potassium monitoring  01/19/2023    DTaP/Tdap/Td vaccine (2 - Td or Tdap) 08/11/2023    DEXA (modify frequency per FRAX score)  Completed    Flu vaccine  Completed    Pneumococcal 65+ years Vaccine  Completed    COVID-19 Vaccine  Completed    Hepatitis A vaccine  Aged Out    Hib vaccine  Aged Out    Meningococcal (ACWY) vaccine  Aged Out    Hepatitis C screen  Discontinued       Review of Systems  Constitutional: Negative for activity change, appetite change, chills, diaphoresis, fatigue, fever and unexpected weight change. HENT: Negative for congestion, dental problem, drooling, ear discharge, ear pain, facial swelling, hearing loss, mouth sores, nosebleeds, postnasal drip, rhinorrhea, sinus pressure, sneezing, sore throat, tinnitus, trouble swallowing and voice change. Eyes: Negative for photophobia, pain, discharge, redness, itching and visual disturbance. Respiratory: Negative for apnea, cough, choking, chest tightness, shortness of breath, wheezing and stridor. Cardiovascular: Negative for chest pain, palpitations and leg swelling. Gastrointestinal: Negative for abdominal distention, abdominal pain, anal bleeding, blood in stool, constipation, diarrhea, nausea, rectal pain and vomiting. Endocrine: Negative for cold intolerance, heat intolerance, polydipsia, polyphagia and polyuria. Genitourinary: Negative for decreased urine volume, difficulty urinating, dysuria, enuresis, flank pain, frequency, genital sores, hematuria and urgency.    Musculoskeletal: Negative for arthralgias, back pain, gait problem, joint swelling, myalgias, neck pain and neck stiffness. Skin: Negative for color change, pallor, rash and wound. Allergic/Immunologic: Negative for environmental allergies, food allergies and immunocompromised state. Neurological: Negative for dizziness, tremors, seizures, syncope, facial asymmetry, speech difficulty, weakness, light-headedness, numbness and headaches. Hematological: Negative for adenopathy. Does not bruise/bleed easily. Psychiatric/Behavioral: Negative for agitation, behavioral problems, confusion, decreased concentration, dysphoric mood, hallucinations, self-injury, sleep disturbance and suicidal ideas. The patient is not nervous/anxious and is not hyperactive. OBJECTIVE    VITALS:  Vitals:    01/19/22 1912   BP: (!) 156/74   Pulse: 66   Resp: 16   Temp:    SpO2: 100%     Physical Exam  Vitals reviewed. Constitutional:       Appearance: Normal appearance. She is well-developed. HENT:      Head: Normocephalic and atraumatic. Mouth/Throat:      Mouth: Mucous membranes are moist.      Pharynx: No oropharyngeal exudate. Eyes:      General: No scleral icterus. Extraocular Movements: Extraocular movements intact. Pupils: Pupils are equal, round, and reactive to light. Neck:      Thyroid: No thyromegaly. Trachea: No tracheal deviation. Cardiovascular:      Rate and Rhythm: Normal rate and regular rhythm. Pulses: Normal pulses. Pulmonary:      Effort: Pulmonary effort is normal.      Breath sounds: Normal breath sounds. Chest:   Breasts:      Right: Tenderness present. No swelling, bleeding, inverted nipple, mass, nipple discharge, skin change, axillary adenopathy or supraclavicular adenopathy. Left: Normal. No swelling, bleeding, inverted nipple, mass, nipple discharge, skin change, tenderness, axillary adenopathy or supraclavicular adenopathy. Comments: brusing to right breast from biopsy   Abdominal:      Palpations: Abdomen is soft.       Tenderness: There is no abdominal tenderness. There is no guarding. Hernia: No hernia is present. Musculoskeletal:         General: No deformity. Normal range of motion. Cervical back: Normal range of motion and neck supple. Lymphadenopathy:      Upper Body:      Right upper body: No supraclavicular, axillary or pectoral adenopathy. Left upper body: No supraclavicular, axillary or pectoral adenopathy. Skin:     General: Skin is warm. Findings: No rash. Neurological:      Mental Status: She is alert and oriented to person, place, and time. Psychiatric:         Mood and Affect: Mood normal.         Speech: Speech normal.         Behavior: Behavior normal.         Thought Content: Thought content normal.             PATHOLOGY:  Clinical Information: RT BREAST     ADDENDUM REPORT (1/12/2022): FINAL DIAGNOSIS:   Breast, right, 700+2, tribell clip, core needle biopsies:             Well differentiated invasive ductal carcinoma, [place]   score 1 of 3.      BREAST BIOMARKERS*  (1/12/2022)   Estrogen Receptor: (Clone SP1), [employer]       ( x ) Positive 95% of cells (>10% of cells)   Intensity of Staining:       ( x ) Strong     Progesterone Receptor: (Clone 1E2), [employer]       ( x ) Positive 100% of cells   Intensity of Staining:       ( x ) Strong     Ki-67 (clone 30-9)       Percentage of positive nuclei:  15%               Favorable <10%               Borderline 10-20%               Unfavorable >20%          2018 ASCO/ CAP   Inform HER2 Dual MALAIKA        HER2 dual MALAIKA    [employer]        Group #   ( x  Group 5:         HER2/CEP17 Ratio <2.0 and <4.0 HER2    )                    signals/cell                         HER2 MALAIKA NEGATIVE                           Number of observers:  1                         Number of invasive tumor cells counted: 20                         Using dual probe assay:                          Average number of HER2 signals per cell:   2.8                          Average number of CEP17 signals per cell:  2                          HER2/CEP17 ratio:  1.4       External Controls:  ( x )  Adequate    (  ) Inadequate   Internal Controls:  ( x )  Adequate     (  ) No internal control (ER is   0 - <10%).  Recommend repeat testing on another specimen. Standard Assay Conditions:  ( x )  Met   (  ) Not Met (Cold ischemic   time>1 hr., and/or fixation time<6 or>72 hrs. for hormone receptors). Staining Method Used:    Formalin fixation    Antigen retrieval type:  Cell Conditioning 1, mild kennedi    Time in antigen retrieval:  30 minutes    Detection system type:   DAB Ultraview kit       Specimen:   CORE NEEDLE BREAST BIOPSY, RT MASS 700 +2 TRIBELL CLIP       Gross Examination:   The container is labeled [place], right, 700+2, lower outer,   tribell.  Received in formalin are multiple fragments of yellow-white   fibroadipose tissue aggregating to 1.2 x 0.9 x 0.1 cm.  1 ns. ALP/DKR:[id number]     Fixative:  10% neutral buffered formalin   Tissue removal time:  09:39   Tissue fixation time:  09:40   Total fixation time: 10 hours, 20 minutes     Microscopic Examination:   Histologic Type: Invasive ductal   Histologic grade:               Tubule Formation: > 75%               Nuclear Grade: II               Mitotic Count: 0-5/10 HPF               Livia Score: I (3-5 pts)   Tumor Size: The largest focus of tumor spans 0.4 cm. Angiolymphatic invasion: Absent   DCIS: Absent   Other: An immunoperoxidase stain for p63* is performed with adequate   controls. Shailesh You is an absence of a myoepithelial cell layer in the   areas of invasive tumor.  Microcalcifications are present within   invasive tumor. RADIOLOGY:         In the lower outer right breast at 7:00, 2 cm from the nipple, there is a 4 x 3 x 5 mm hypoechoic mass. This has spiculated margins. There is an echogenic rim. There is some associated color flow.  This corresponds to the mammographic finding.       There are no sonographic abnormalities in the axilla. A normal lymph node is seen.            IMPRESSION:   5 mm spiculated mass in the lower outer right breast. An ultrasound-guided biopsy is recommended.       An ultrasound-guided biopsy is recommended.       These results were discussed with the patient. The tech navigator was notified. We will arrange scheduling the patient for her procedure per department protocol.                 Electronically signed by Candice Guzman MD on 1/20/2022 at 3:24 PM    The patients records and films were reviewed independently. Time was given for questions . All questions were answered to the patients satisfaction. A total time of 90 minutes  was spent with at least 51% related to counseling and coordination of care.

## 2022-01-20 NOTE — OP NOTE
Operative Note      Patient: Leonardo Bermudez  YOB: 1950  MRN: [id number]    Date of Procedure: 1/19/2022    Pre-Op Diagnosis: RIGHT BREAST CANCER  Cancer Staging  Malignant neoplasm of lower-outer quadrant of breast of female, estrogen receptor positive ([facility]  Staging form: Breast, AJCC 8th Edition  - Clinical stage from 1/14/2022: Stage IA (cT1a, cN0, cM0, G1, ER+, ND+, HER2-) - Signed by Anthony Kelsey MD on 1/14/2022    Post-Op Diagnosis: Same       Procedure(s):  RIGHT LUMPECTOMY WITH PREOP OPERATIVE NEEDLE LOCALIZATION    Surgeon(s): Anthony Kelsey MD    Assistant:   * No surgical staff found *    Anesthesia: Monitor Anesthesia Care    Estimated Blood Loss (mL): 15 ml    Complications: None    Specimens:   ID Type Source Tests Collected by Time Destination   A : Right Breast Mass Tissue Breast SURGICAL PATHOLOGY Anthony Kelsey MD 1/19/2022 1642    B : ADDITIONAL CAUDAL MARGINS, LONG LATERAL DEEP  AS MARKED, NEW MARGINS BLACK SILK Tissue Breast SURGICAL PATHOLOGY Anthony Kelsey MD 1/19/2022 1723        Implants:  * No implants in log *      Drains: * No LDAs found *    Findings:   Post lumpectomy mammogram of tissue demonstrated clip wire and mass within the specimen    Detailed Description of Procedure:   She was seen in the preoperative holding room she had already been down to [facility] where a wire was placed. She was taken the operating placed the operating table spine position    Adequate anesthesia formed was performed she was prepped and draped normal sterile fashion. Using the preoperative wire and imaging curvilinear incision was made areola. The fatty tissue was dissected out with cautery making sure to keep good distance in all directions around the anticipated mass. Dissection ensued circumferentially and once reaching the base of the wire the specimen was transected and removed from its base with cautery.   It was removed maintaining orientation and was marked with margin markers. The caudal margin was closer than I would have liked an additional caudal margin was taken. [facility] what received the specimen, imaging was performed demonstrating that the clip wire and mass were within the specimen. The wound was irrigated was hemostatic clips were placed circumferentially, the wound was closed in layers with deep dermal interrupted Vicryl and the skin was closed with 4-0 Vicryl in a running subcuticular fashion surgical glue was applied. Compression dressing was applied she is woken anesthesia and transferred the PACU in stable condition.   All sponge and needle counts were correct    Electronically signed by Baljinder Tamayo MD on 1/20/2022 at 3:12 PM

## 2022-01-20 NOTE — TELEPHONE ENCOUNTER
Celeste Figueroa returns call , Dr. Tolliver Ohs advice given. Advise Celeste Figueroa ECHO has been ordered.

## 2022-01-28 ENCOUNTER — OFFICE VISIT (OUTPATIENT)
Dept: SURGERY | Age: 72
End: 2022-01-28

## 2022-01-28 VITALS
HEART RATE: 82 BPM | OXYGEN SATURATION: 96 % | WEIGHT: 189 LBS | HEIGHT: 68 IN | RESPIRATION RATE: 18 BRPM | SYSTOLIC BLOOD PRESSURE: 120 MMHG | BODY MASS INDEX: 28.64 KG/M2 | DIASTOLIC BLOOD PRESSURE: 80 MMHG | TEMPERATURE: 95.9 F

## 2022-01-28 DIAGNOSIS — Z09 POSTOP CHECK: Primary | ICD-10-CM

## 2022-01-28 PROCEDURE — 99024 POSTOP FOLLOW-UP VISIT: CPT | Performed by: SURGERY

## 2022-02-01 ENCOUNTER — TELEPHONE (OUTPATIENT)
Dept: SPIRITUAL SERVICES | Facility: CLINIC | Age: 72
End: 2022-02-01

## 2022-02-07 NOTE — PROGRESS NOTES
Mike Laws MD  [address]  Surgery  Clinic Post op Note    Pt Name: Ngoc Montero  Medical Record Number: [id number]  Date of Birth 1950   Today's Date: 2/7/2022    ASSESSMENT       ICD-10-CM    1. Postop check  Z09     Cancer Staging  Malignant neoplasm of lower-outer quadrant of breast of female, estrogen receptor positive ([facility] [address]  Staging form: Breast, AJCC 8th Edition  - Clinical stage from 1/14/2022: Stage IA (cT1a, cN0, cM0, G1, ER+, GA+, HER2-) - Signed by Mike Laws MD on 1/14/2022      PLAN   1. Overall she is making a good recovery. She did have a seroma this was drained. Patient instructed to wear a compressive dressing. Call office if any sensation of it returning. Otherwise I will see her back in 6 months time and then in a years time with repeat mammography. 2. Follow-up has been arranged with Dr. Shahrzad Maria to discuss hormonal therapy, she also will see radiation oncology although she is over 71 she very well will not need radiation but they may want to do a low-dose boosting will allow them to make that decision. Alondra Aldridge is doing doing well she noticed some swelling of her breast.  Is not erythematous. Does not have any fevers or chills. .       CURRENT MEDICATIONS     Current Outpatient Medications on File Prior to Visit   Medication Sig Dispense Refill    lisinopril-hydroCHLOROthiazide (PRINZIDE;ZESTORETIC) 20-12.5 MG per tablet Take 1 tablet by mouth 2 times daily 180 tablet 3    celecoxib (CELEBREX) 200 MG capsule Take 1 capsule by mouth 2 times daily 180 capsule 3    atenolol (TENORMIN) 50 MG tablet Take 1 tablet by mouth daily 90 tablet 3    traZODone (DESYREL) 50 MG tablet Take 1 tablet by mouth nightly as needed for Sleep 30 tablet 5    simvastatin (ZOCOR) 10 MG tablet Take 1 tablet by mouth nightly 90 tablet 3    Handicap Placard MISC by Does not apply route EXPIRES 5 YEARS 1 each 0    therapeutic multivitamin-minerals [facility]) tablet Take 1 tablet by mouth daily.  traMADol (ULTRAM) 50 MG tablet Take 1 tablet by mouth every 6 hours as needed for Pain for up to 90 days. 60 tablet 5    [DISCONTINUED] conjugated estrogens (PREMARIN) 0.625 MG/GM vaginal cream Place vaginally daily. As needed. 1 Tube 3     No current facility-administered medications on file prior to visit. OBJECTIVE   CURRENT VITALS:  height is 5' 7.5\" (1.715 m) and weight is 189 lb (85.7 kg). Her temporal temperature is 95.9 °F (35.5 °C). Her blood pressure is 120/80 and her pulse is 82. Her respiration is 18 and oxygen saturation is 96%. She is alert oriented appropriate no acute distress interactive  Unlabored respirations good air movement bilaterally  Incision is clean dry and intact, there is a small seroma appreciated that is causing discomfort. This was drained by cleaning the area with chlorhexidine infiltrated with local anesthetic and instilling a large bore needle. Approximately 25 cc of clear fluid was evacuated. LABS and Pathology     Clinical Information: RIGHT BREAST CANCER     FINAL DIAGNOSIS:   A. Right breast mass, excision:    Invasive ductal carcinoma, Livia grade 1.    Margins free of carcinoma.    Pathologic stage: pT1a. B. Breast, additional caudal margins, excision:    Negative for malignancy.     Fibrocystic changes.       Incidental intraductal papilloma, excised. Specimen:   A) EXCISION OF BREAST, RIGHT MASS   B) EXCISION OF BREAST, ADDITIONAL CAUDAL MARGINS       Gross Examination:   A - The container is labeled Priscila Casas, right breast mass.    Received fresh on an x-ray grid is an oriented lumpectomy specimen   including a localization wire.  Accompanying x-ray indicates a biopsy   clip at grid coordinate D5.  The specimen measures 7 cm from the medial   to lateral margin, 4 cm from the cranial to caudal margin, and 2 cm   from the skin to deep margin.  The deep margin is inked blue, the skin   margin is inked yellow, the cranial and lateral margins are inked   black, and the caudal and medial margins are inked green.  The cranial   and caudal margins are ragged and friable.  Sections through the   specimen reveal a yellow fatty cut surface. Bonifacio Haque is a large   hemorrhagic biopsy site grossly measuring 1.2 x 1.1 x about 2.3 cm. There are no discrete masses.  The biopsy site is focally 0.2 cm from   the deep margin, which grossly appears to be the nearest margin. Representative sections are submitted.  Cassette #1 - lateral margin;   cassettes #2 through #10 all include the skin and deep margin, cassette   #2 - cranial; cassette #3 - caudal; cassette #4 - cranial; cassette #5   - caudal; cassette #6 - cranial; cassette #7 - caudal; cassette #8 -   cranial; cassette #9 - caudal; cassette #10 - caudal; and cassette #11   - medial margin.  ss. Fixative:  10% neutral buffered formalin   Tissue removal time:  1711   Tissue fixation time:  8965   Total fixation time: 24 hours 15 minutes     B - The container is labeled Sherl Hodgkins, additional caudal   margin long lateral, deep is marked new margin, black silk.  Received   in formalin is a segment of yellow fibroadipose tissue measuring 4 x 3   x about 1.7 cm.  There is a black suture indicating the new margin. There is a long suture on the lateral margin.  Separate in the specimen   container is a metallic deep tag.  The tag came loose from the specimen   and is not attached.  The new margin is inked blue and the lateral   margin is inked yellow.  Sections through the specimen reveal no   discrete lesions.  Cassettes #1 through #5 - blue inked new margin; and   cassette #6 - new and lateral margins.  ss.  LEOLA/ERNAR:[id number]     Microscopic Examination:   A-B.    Reporting Template   Protocol Posting Date: November 2021   CASE SUMMARY: (INVASIVE CARCINOMA OF THE BREAST: Resection)   Standard(s): AJCC-UICC 8     Procedure   Excision (less than total mastectomy)     Specimen Laterality   Right     Tumor Site   7:00, N+2     Histologic Type   Invasive carcinoma of no special type (ductal)     Histologic Grade (Charlotte Histologic Score)     Glandular (Acinar) / Tubular Differentiation   Score 2 (10 to 75% of tumor area forming glandular / tubular   structures)     Nuclear Pleomorphism   Score 2 (Cells larger than normal with open vesicular nuclei, visible   nucleoli, and moderate variability in both size and shape)     Mitotic Rate   Score 1     Overall Grade   Grade 1 (scores of 3, 4 or 5)     Tumor Size   Greatest dimension of largest invasive focus greater than 1 mm: 5 mm     Tumor Focality   Single focus of invasive carcinoma     Ductal Carcinoma In Situ (DCIS)   Not identified     Lobular Carcinoma In Situ (LCIS)   Not identified     Lymphovascular Invasion   Not identified     Microcalcifications   Present in non-neoplastic tissue     Treatment Effect in the Breast   No known presurgical therapy     Margin Status for Invasive Carcinoma   All margins negative for invasive carcinoma     Distance from Invasive Carcinoma to Closest Margin   Exact distance: 5 mm     Closest Margin(s) to Invasive Carcinoma   Deep     Regional Lymph Node Status   Not applicable (no regional lymph nodes submitted or found)     PATHOLOGIC STAGE CLASSIFICATION (pTNM, AJCC 8th Edition)     TNM Descriptors   Not applicable     pT Category   pT1a: Tumor greater than 1 mm but less than or equal to 5 mm in   greatest dimension     pN Category   pN not assigned (no nodes submitted or found)     Additional Findings (specify): Focal ADH is present separate from the   tumor and free of the margins.      Breast Biomarker Testing Performed on Previous Biopsy     Estrogen Receptor (ER) Status   Positive (greater than 10% of cells demonstrate nuclear positivity)   Percentage of Cells with Nuclear Positivity   95 %     Progesterone Receptor (PgR) Status   Positive   Percentage of Cells with Nuclear Positivity   100%     HER2 (by in situ hybridization)   Negative (not amplified)     Ki-67 Percentage of Positive Nuclei: 15%     Testing Performed on Case Number: [id number]     Comment: No residual carcinoma is present in part B.      RADIOLOGY   na    Electronically signed by Tiff Khan MD on 2/7/2022 at 10:38 AM

## 2022-02-08 ENCOUNTER — HOSPITAL ENCOUNTER (OUTPATIENT)
Dept: PHYSICAL THERAPY | Age: 72
Setting detail: THERAPIES SERIES
Discharge: HOME | End: 2022-02-08
Payer: MEDICARE

## 2022-02-08 PROCEDURE — 97140 MANUAL THERAPY 1/> REGIONS: CPT

## 2022-02-08 PROCEDURE — 97161 PT EVAL LOW COMPLEX 20 MIN: CPT

## 2022-02-08 NOTE — DISCHARGE SUMMARY
Admits to issues on the right shoulder with reaching and mobility but states that is due to the arthritis and not her recent surgery. Healing well from surgery other than seroma. Social/Functional History and Current Status:  Medications and Allergies have been reviewed and are listed on Medical History Questionnaire. Maria C Beck lives alone in a single story home with stairs and a handrail to enter.     Task Previous Current   ADLs  Independent Independent   IADL's Independent Independent   Ambulation Independent Independent   Transfers Independent Independent   Recreation Independent Enjoys walking and reading - had been walking 3 miles per day but stopped about 6 weeks ago due to foot issue   Community Integration Independent Independent   Driving Active  Active    Work Retired  Retired     OBJECTIVE:   Posture: Poor, Protruded Head, Protracted Scapula  Palpation: No tenderness reported; firm seroma/hematoma-like structure located at base of breast just distal to lumpectomy incision  Observation: Incision well healing and yellow/purple bruising just distal to lumpectomy incision    Range of Motion/Strength (Range of Motion in degrees)    Right Left Comments   Shoulder Flexion PROM 141 153    Shoulder ABDuction PROM 164 165    Shoulder Strength 4/5 4/5    Elbow Strength 4+/5 4+/5      Circumferential Measurements:   Upper Extremity Circumferential Measurements    Right (cm) Left (cm) Comments   Met Heads 20.7 19.8    Ulnar Styloid Process 18 17.4    10 cm distal to Lateral Epicondyle 26.1 26.5    Elbow Crease 28.6 29    10 cm proximal from Lateral Epicondyle 33.5 34    Axilla 36.8 35.6    Total 163.7 162.3 Axilla - to - Axilla: NT     Brief Fatigue Inventory: 1.8 (0: none, 1-3: mild, 4-6: moderate, 7-10: severe)  Quick Dash: 20.5% impaired    Treatment Initiated:     TREATMENT   Precautions:    Pain: 0/10    X in shaded column indicates activity completed today   Modalities Parameters/  Location  Notes         Manual Therapy Time/Technique  Notes   Gentle MLD to R breast at area of hematoma/seroma-like structure 10 min x Education on how to perform independently to help soften this structure   Exercise/Intervention   Notes            Specific Interventions Next Treatment: N/A    Activity Tolerance: Patient tolerated treatment well    ASSESSMENT:  Assessment: Pt presents with poor R shoulder ROM and poor posture with R breast hematoma/seroma-like structure following her R lumpectomy. She has been educated on how to perform manual techniques to help reduce this firm breast mass and encouraged to follow up with her specialist for a R shoulder replacement. No further skilled care is recommended at this time. Body Structures/Functions/Activity Limitations:Decreased affected limb range of motion and Impaired posture  Prognosis: good    GOALS:  Patient Goal: Get shoulder replaced    Short Term Goals:  1. See LTGs    Long Term Goals to be met in 1 visit:   1. Pt to be independent with self MLD to help drain R breast of post-surgical edema and reduce/soften hematoma/seroma-like structure for tolerance of wearing a bra and comfort with sleeping. - GOAL MET. No new goal.      Patient Education: Plan of care, goals. See \"Treatment Initiated\" for further details. Recommendation of discharge to [facility]. Pt in agreement.   Education Outcome: Verbalized understanding  Education Barriers: None    PLAN:  Discharge    Time In 0915   Time Out 0950   Timed Code Minutes: 10 min   Total Treatment Time: 35 min       Electronically Signed by: Jennifer Leyva, PT , DPT, DG, CLT [id number] 2/8/2022

## 2022-02-09 ENCOUNTER — OFFICE VISIT (OUTPATIENT)
Dept: ONCOLOGY | Age: 72
End: 2022-02-09
Payer: MEDICARE

## 2022-02-09 ENCOUNTER — CLINICAL DOCUMENTATION (OUTPATIENT)
Dept: CASE MANAGEMENT | Age: 72
End: 2022-02-09

## 2022-02-09 ENCOUNTER — HOSPITAL ENCOUNTER (OUTPATIENT)
Dept: INFUSION THERAPY | Age: 72
Discharge: HOME | End: 2022-02-09
Payer: MEDICARE

## 2022-02-09 VITALS
DIASTOLIC BLOOD PRESSURE: 67 MMHG | HEIGHT: 68 IN | WEIGHT: 191 LBS | SYSTOLIC BLOOD PRESSURE: 132 MMHG | BODY MASS INDEX: 28.95 KG/M2 | OXYGEN SATURATION: 96 % | HEART RATE: 72 BPM | RESPIRATION RATE: 18 BRPM | TEMPERATURE: 98.5 F

## 2022-02-09 DIAGNOSIS — Z98.890 STATUS POST RIGHT BREAST LUMPECTOMY: ICD-10-CM

## 2022-02-09 DIAGNOSIS — C50.511 MALIGNANT NEOPLASM OF LOWER-OUTER QUADRANT OF RIGHT BREAST OF FEMALE, ESTROGEN RECEPTOR POSITIVE (HCC): Primary | ICD-10-CM

## 2022-02-09 DIAGNOSIS — C50.919 HER2-NEGATIVE CARCINOMA OF BREAST (HCC): ICD-10-CM

## 2022-02-09 DIAGNOSIS — Z17.0 MALIGNANT NEOPLASM OF LOWER-OUTER QUADRANT OF RIGHT BREAST OF FEMALE, ESTROGEN RECEPTOR POSITIVE (HCC): Primary | ICD-10-CM

## 2022-02-09 PROCEDURE — G8417 CALC BMI ABV UP PARAM F/U: HCPCS | Performed by: INTERNAL MEDICINE

## 2022-02-09 PROCEDURE — 1090F PRES/ABSN URINE INCON ASSESS: CPT | Performed by: INTERNAL MEDICINE

## 2022-02-09 PROCEDURE — G9899 SCRN MAM PERF RSLTS DOC: HCPCS | Performed by: INTERNAL MEDICINE

## 2022-02-09 PROCEDURE — G8484 FLU IMMUNIZE NO ADMIN: HCPCS | Performed by: INTERNAL MEDICINE

## 2022-02-09 PROCEDURE — 3017F COLORECTAL CA SCREEN DOC REV: CPT | Performed by: INTERNAL MEDICINE

## 2022-02-09 PROCEDURE — 99205 OFFICE O/P NEW HI 60 MIN: CPT | Performed by: INTERNAL MEDICINE

## 2022-02-09 PROCEDURE — 99211 OFF/OP EST MAY X REQ PHY/QHP: CPT

## 2022-02-09 PROCEDURE — G8399 PT W/DXA RESULTS DOCUMENT: HCPCS | Performed by: INTERNAL MEDICINE

## 2022-02-09 PROCEDURE — G8427 DOCREV CUR MEDS BY ELIG CLIN: HCPCS | Performed by: INTERNAL MEDICINE

## 2022-02-09 PROCEDURE — 4040F PNEUMOC VAC/ADMIN/RCVD: CPT | Performed by: INTERNAL MEDICINE

## 2022-02-09 PROCEDURE — 1123F ACP DISCUSS/DSCN MKR DOCD: CPT | Performed by: INTERNAL MEDICINE

## 2022-02-09 PROCEDURE — 1036F TOBACCO NON-USER: CPT | Performed by: INTERNAL MEDICINE

## 2022-02-09 RX ORDER — ANASTROZOLE 1 MG/1
1 TABLET ORAL DAILY
Qty: 30 TABLET | Refills: 3 | Status: SHIPPED | OUTPATIENT
Start: 2022-02-09 | End: 2022-06-09 | Stop reason: SDUPTHER

## 2022-02-09 NOTE — PROGRESS NOTES
Name: Ngoc Montero  : 1950  MRN: [id number]    Oncology Navigation Follow-Up Note    Contact Type:  Medical Oncology    Subjective: Patient seen in conjunction with Dr. Shahrzad Maria for initial consultation    Objective: Right breast lumpectomy 22 IDC ER+ MI+ HER2-, 5mm maximum dimention    Assistance Needed: not at this time    Receptive to Advanced Care Planning / Palliative Care:  NA stage 1    Referrals: Referred to Radiation therapy per Dr. Shahrzad Maria    Education: Reinforced plan of care and potential side effects of AI. Patient will start AI upon return from vacation 3/9/2022     Notes: Will follow through continuum of care.      Electronically signed by Christine Grover RN on 2022 at 2:34 PM

## 2022-02-09 NOTE — PROGRESS NOTES
[facility] [address] [facility], [address] [facility] [address]  [place]  Dept: [phone]  Dept Fax: [phone]: [phone]    Visit Date:2/9/2022     Juan M Jordan is a 80 y.o. female who presents today for:   Chief Complaint   Patient presents with    New Patient     Malignant neoplasm of lower-outer quadrant of right breast of female, estrogen receptor positive ([facility] [address]        HPI:   This is a 60-year-old patient with newly diagnosed right breast cancer. She presents to the medical oncology clinic to discuss postsurgical treatment. Patient had screening mammogram on January 5, 2022 that showed 5 mm oval density in the central right breast, middle depth. Spot compression views, and LM view and an ultrasound were performed and confirmed the presence of spiculated 4 x 3 x 5 mm hypoechoic mass. Subsequently on January 10, 2022 the patient underwent mass biopsy that revealed well differentiated invasive ductal carcinoma, Livia score 1 of 3. Estrogen Receptor: Positive 95% of cells, Progesterone Receptor: Positive 100% of cells, Ki-67 Percentage of positive nuclei:  15%    HER2 MALAIKA NEGATIVE   She met with the surgeon Dr. Tushar Saab and after discussing her surgical options she decided to proceed with right breast lumpectomy. Due to her age of 80 is a sentinel lymph node biopsy was omitted, since clinically she had a negative axillary lymph nodes. She had surgery on January 19, 2022, final pathology report showed:  A. Right breast mass, excision:    Invasive ductal carcinoma, Livia grade 1.    Margins free of carcinoma.    Pathologic stage: pT1a. B. Breast, additional caudal margins, excision:    Negative for malignancy.     Fibrocystic changes.       Incidental intraductal papilloma, excised.        Risk assessment:   Onset of menses at at 14  Onset of menopause at 52ish  Fist child at age of 26  No hormonal therapy     Her postsurgical course is unremarkable  HPI Past Medical History:   Diagnosis Date    Arthritis     Borderline hyperlipidemia     Breast cancer ([facility] [address] 2022    right    Diabetic ulcer of toe of left foot associated with type 2 diabetes mellitus, limited to breakdown of skin ([facility] [address] 08/31/2020    Hypertension     Invasive ductal carcinoma of breast ([facility] 01/2022    right breast    Type II or unspecified type diabetes mellitus without mention of complication, not stated as uncontrolled     prediabetes-no meds      Past Surgical History:   Procedure Laterality Date    BREAST LUMPECTOMY Right 1/19/2022    RIGHT LUMPECTOMY WITH PREOP OPERATIVE NEEDLE LOCALIZATION performed by Fiorella Castelan MD at [place] [address] Bilateral     sanko    JOINT REPLACEMENT Bilateral     sanko    SHELL US GUID NDL BIOPSY RIGHT Right 01/10/2022    [facility] [address] BIOPSY RIGHT 1/10/2022 [address] [facility]    SHOULDER ARTHROPLASTY Left     [place]    US GUIDED NEEDLE LOC OF RIGHT BREAST Right 1/19/2022    US GUIDED NEEDLE LOC OF RIGHT BREAST 1/19/2022 Merrick Calvillo MD [address] [facility]      Family History   Problem Relation Age of Onset    Arthritis Mother     Parkinson's Disease Mother     Heart Disease Father     High Blood Pressure Father     Diabetes Father     High Blood Pressure Brother     High Blood Pressure Brother     No Known Problems Maternal Grandmother     Cancer Maternal Grandfather         bone    No Known Problems Paternal Grandmother     No Known Problems Paternal Grandfather     Cancer Maternal Aunt         bone      Social History     Tobacco Use    Smoking status: Never Smoker    Smokeless tobacco: Never Used   Substance Use Topics    Alcohol use: Yes     Comment: very rare      Current Outpatient Medications   Medication Sig Dispense Refill    anastrozole (ARIMIDEX) 1 MG tablet Take 1 tablet by mouth daily (Patient not taking: Reported on 2/11/2022) 30 tablet 3    lisinopril-hydroCHLOROthiazide (PRINZIDE;ZESTORETIC) 20-12.5 MG per tablet Take 1 tablet by mouth 2 times daily 180 tablet 3    traMADol (ULTRAM) 50 MG tablet Take 1 tablet by mouth every 6 hours as needed for Pain for up to 90 days. 60 tablet 5    celecoxib (CELEBREX) 200 MG capsule Take 1 capsule by mouth 2 times daily 180 capsule 3    atenolol (TENORMIN) 50 MG tablet Take 1 tablet by mouth daily 90 tablet 3    traZODone (DESYREL) 50 MG tablet Take 1 tablet by mouth nightly as needed for Sleep 30 tablet 5    simvastatin (ZOCOR) 10 MG tablet Take 1 tablet by mouth nightly 90 tablet 3    Handicap Placard MISC by Does not apply route EXPIRES 5 YEARS 1 each 0    therapeutic multivitamin-minerals (THERAGRAN-M) tablet Take 1 tablet by mouth daily. No current facility-administered medications for this visit. No Known Allergies   Health Maintenance   Topic Date Due    Depression Screen  Never done    Shingles Vaccine (1 of 2) Never done    Diabetic retinal exam  08/23/2017    Annual Wellness Visit (AWV)  01/16/2022    Colorectal Cancer Screen  02/04/2022    A1C test (Diabetic or Prediabetic)  10/12/2022    Diabetic microalbuminuria test  10/12/2022    Lipid screen  10/12/2022    Creatinine monitoring  10/12/2022    Diabetic foot exam  10/14/2022    Breast cancer screen  01/19/2023    Potassium monitoring  01/19/2023    DTaP/Tdap/Td vaccine (2 - Td or Tdap) 08/11/2023    DEXA (modify frequency per FRAX score)  Completed    Flu vaccine  Completed    Pneumococcal 65+ years Vaccine  Completed    COVID-19 Vaccine  Completed    Hepatitis A vaccine  Aged Out    Hib vaccine  Aged Out    Meningococcal (ACWY) vaccine  Aged Out    Hepatitis C screen  Discontinued        Subjective:   Review of Systems   Constitutional: Negative for activity change, appetite change, fatigue and fever. HENT: Negative for congestion, dental problem, facial swelling, hearing loss, mouth sores, nosebleeds, sore throat, tinnitus and trouble swallowing.     Eyes: Negative for discharge and visual disturbance. Respiratory: Negative for cough, chest tightness, shortness of breath and wheezing. Cardiovascular: Negative for chest pain, palpitations and leg swelling. Gastrointestinal: Negative for abdominal distention, abdominal pain, blood in stool, constipation, diarrhea, nausea, rectal pain and vomiting. Endocrine: Negative for cold intolerance, polydipsia and polyuria. Genitourinary: Negative for decreased urine volume, difficulty urinating, dysuria, flank pain, hematuria and urgency. Musculoskeletal: Negative for arthralgias, back pain, gait problem, joint swelling, myalgias and neck stiffness. Skin: Negative for color change, rash and wound. Neurological: Negative for dizziness, tremors, seizures, speech difficulty, weakness, light-headedness, numbness and headaches. Hematological: Negative for adenopathy. Does not bruise/bleed easily. Psychiatric/Behavioral: Negative for confusion and sleep disturbance. The patient is not nervous/anxious. Objective:   Physical Exam  Vitals reviewed. Constitutional:       General: She is not in acute distress. Appearance: She is well-developed. HENT:      Head: Normocephalic. Mouth/Throat:      Pharynx: No oropharyngeal exudate. Eyes:      General: No scleral icterus. Right eye: No discharge. Left eye: No discharge. Pupils: Pupils are equal, round, and reactive to light. Neck:      Thyroid: No thyromegaly. Vascular: No JVD. Trachea: No tracheal deviation. Cardiovascular:      Rate and Rhythm: Normal rate. Heart sounds: Normal heart sounds. No murmur heard. No friction rub. No gallop. Pulmonary:      Effort: Pulmonary effort is normal. No respiratory distress. Breath sounds: Normal breath sounds. No stridor. No wheezing or rales. Chest:      Chest wall: No tenderness. Breasts:      Right: Skin change (Lumpectomy incision and nicely healed) present. Abdominal:      General: Bowel sounds are normal. There is no distension. Palpations: Abdomen is soft. There is no mass. Tenderness: There is no abdominal tenderness. There is no rebound. Musculoskeletal:         General: Normal range of motion. Cervical back: Normal range of motion and neck supple. Comments: Good range of motion in all four extremities. Lymphadenopathy:      Cervical: No cervical adenopathy. Skin:     General: Skin is warm. Findings: No erythema or rash. Neurological:      Mental Status: She is alert and oriented to person, place, and time. Cranial Nerves: No cranial nerve deficit. Motor: No abnormal muscle tone. Deep Tendon Reflexes: Reflexes are normal and symmetric. Psychiatric:         Behavior: Behavior normal.         Thought Content: Thought content normal.         Judgment: Judgment normal.         /67 (Site: Left Upper Arm, Position: Sitting, Cuff Size: Medium Adult)   Pulse 72   Temp 98.5 °F (36.9 °C) (Oral)   Resp 18   Ht 5' 7.5\" (1.715 m)   Wt 191 lb (86.6 kg)   SpO2 96%   BMI 29.47 kg/m²      ECOG status is 0    Imaging studies and labs:     Lab Results   Component Value Date    WBC 3.9 (L) 09/11/2020    HGB 13.3 01/17/2022    HCT 40.2 01/17/2022    MCV 97.9 09/11/2020     09/11/2020       Chemistry        Component Value Date/Time     10/12/2021 0000    K 5.2 01/19/2022 1034     10/12/2021 0000    CO2 105 10/12/2021 0000    BUN 14 10/12/2021 0000    CREATININE 0.76 10/12/2021 0000        Component Value Date/Time    CALCIUM 9.0 10/12/2021 0000    ALKPHOS 94 10/12/2021 0000    AST 21 10/12/2021 0000    ALT 21 10/12/2021 0000    BILITOT 0.5 10/12/2021 0000            Assessment/Plan: 1. Stage IA (pT1a, pN0, cM0, G1, ER+, VT+, HER2-)    The patient is diagnosed with stage I A hormone responsive right breast cancer. She is status post right lumpectomy with axillary LN biopsy.  She presents to the Medical Oncology Clinic to discuss further management. The patient was informed that she has an early stage of breast cancer that does not require systemic chemotherapy. Since her tumor was smaller than 5 mm in size there is no need to assess it by the Breast OncotypeDx assay. Next, we discuss the role of radiation therapy. I presented the patient with data from two studies that evaluated the benefit of postlumpectomy radiation in older patients with stage I hormone responsive breast cancer. The [facility] 9343 study enrolled patients 79years of age and older. The results showed 10% risk of local disease recurrence at 10 years in women treated with tamoxifen only ( no radiation therapy) in contrast to 2% risk of local disease recurrence at 10 years in women treated with tamoxifen and radiation therapy. However overall survival was the same for both groups. PRIME 2 study, confirmed a modest reduction in recurrence rate with RT that did not translate into a survival benefit. After a median follow-up of five years, ipsilateral breast tumor recurrences were lower in women assigned to RT (1.3 versus 4.1 percent). No differences in overall survival, regional recurrence, distant metastases, contralateral breast cancers, or new breast cancers were noted between the two groups. The patient is leaning toward receiving radiation therapy, she does not want to worry about the risk of local recurrence. Referral is made to radiation oncology. Finally, we talked about adjuvant hormonal therapy with Aromatase Inhibitor. Adjuvant endocrine therapy reduces breast cancer recurrence and breast cancer mortality in postmenopausal women, when compared with tamoxifen  AIs result in a more substantial reduction in recurrence rates during treatment and lower breast cancer mortality both during and after treatment. Possible side effects of AI were reviewed with the patient.  They include:    - Decreased bone mineral density.          - Hypercholesterolemia. - Musculoskeletal symptoms. The patient will have DEXA study before RTC. Diagnosis Orders   1. Malignant neoplasm of lower-outer quadrant of right breast of female, estrogen receptor positive ([facility] [address]  Ambulatory referral to Radiation Oncology   2. Status post right breast lumpectomy  Ambulatory referral to Radiation Oncology   3. HER2-negative carcinoma of breast ([facility]          Plan:   Return in about 2 months (around 4/15/2022). Orders Placed:   Orders Placed This Encounter   Procedures    Ambulatory referral to Radiation Oncology     Referral Priority:   Routine     Referral Type:   Consult for Advice and Opinion     Referral Reason:   Specialty Services Required     Requested Specialty:   Radiation Oncology     Number of Visits Requested:   1        Medications Prescribed:   Orders Placed This Encounter   Medications    anastrozole (ARIMIDEX) 1 MG tablet     Sig: Take 1 tablet by mouth daily     Dispense:  30 tablet     Refill:  3            Discussed use, benefit, and side effectsof prescribed medications. All patient questions answered. Pt voiced understanding. Instructed to continue current medications, diet and exercise. Patient agreed with treatment plan. Follow up as directed.     Electronically signed by Suresh Page MD on 2/8/22 at 9:54 PM EST

## 2022-02-11 ENCOUNTER — OFFICE VISIT (OUTPATIENT)
Dept: SURGERY | Age: 72
End: 2022-02-11

## 2022-02-11 VITALS
TEMPERATURE: 96.8 F | OXYGEN SATURATION: 98 % | DIASTOLIC BLOOD PRESSURE: 62 MMHG | RESPIRATION RATE: 18 BRPM | BODY MASS INDEX: 29.12 KG/M2 | SYSTOLIC BLOOD PRESSURE: 128 MMHG | HEART RATE: 64 BPM | HEIGHT: 68 IN | WEIGHT: 192.1 LBS

## 2022-02-11 DIAGNOSIS — Z09 POSTOPERATIVE EXAMINATION: Primary | ICD-10-CM

## 2022-02-11 PROCEDURE — 99024 POSTOP FOLLOW-UP VISIT: CPT | Performed by: SURGERY

## 2022-02-11 NOTE — PROGRESS NOTES
Daysi Juarez MD  [address]  Surgery  Clinic Post op Note    Pt Name: Alecia Merchant  Medical Record Number: [id number]  Date of Birth 1950   Today's Date: 2/11/2022    ASSESSMENT       ICD-10-CM    1. Postoperative examination  Z09         PLAN   1. Note drainable seroma today. Patient told to continue to monitor any changes to return to clinic otherwise she was seen back in 6 months  Berry Rhodes is doing overall well, she says she does have a little bit of tenderness at her lumpectomy site it is slightly firm which she wanted evaluated. No fevers or chills. Cancer Staging  Malignant neoplasm of lower-outer quadrant of breast of female, estrogen receptor positive ([facility] [address]  Staging form: Breast, AJCC 8th Edition  - Clinical stage from 1/14/2022: Stage IA (cT1a, cN0, cM0, G1, ER+, W[place]+, HER2-) - Signed by Daysi Juarez MD on 1/14/2022  - Pathologic stage from 1/28/2022: Stage IA (pT1a, pN0, cM0, G1, ER+, WV+, HER2-) - Signed by Daysi Juarez MD on 2/8/2022      CURRENT MEDICATIONS     Current Outpatient Medications on File Prior to Visit   Medication Sig Dispense Refill    lisinopril-hydroCHLOROthiazide (PRINZIDE;ZESTORETIC) 20-12.5 MG per tablet Take 1 tablet by mouth 2 times daily 180 tablet 3    celecoxib (CELEBREX) 200 MG capsule Take 1 capsule by mouth 2 times daily 180 capsule 3    atenolol (TENORMIN) 50 MG tablet Take 1 tablet by mouth daily 90 tablet 3    traZODone (DESYREL) 50 MG tablet Take 1 tablet by mouth nightly as needed for Sleep 30 tablet 5    simvastatin (ZOCOR) 10 MG tablet Take 1 tablet by mouth nightly 90 tablet 3    Handicap Placard MISC by Does not apply route EXPIRES 5 YEARS 1 each 0    therapeutic multivitamin-minerals (THERAGRAN-M) tablet Take 1 tablet by mouth daily.       anastrozole (ARIMIDEX) 1 MG tablet Take 1 tablet by mouth daily (Patient not taking: Reported on 2/11/2022) 30 tablet 3    traMADol (ULTRAM) 50 MG tablet Take 1 tablet by mouth every 6 hours as needed for Pain for up to 90 days. 60 tablet 5    [DISCONTINUED] conjugated estrogens (PREMARIN) 0.625 MG/GM vaginal cream Place vaginally daily. As needed. 1 Tube 3     No current facility-administered medications on file prior to visit. OBJECTIVE   CURRENT VITALS:  height is 5' 7.5\" (1.715 m) and weight is 192 lb 1.6 oz (87.1 kg). Her temporal temperature is 96.8 °F (36 °C). Her blood pressure is 128/62 and her pulse is 64. Her respiration is 18 and oxygen saturation is 98%. She is alert oriented appropriate no acute distress interactive  Her right-sided incision is clean dry and intact out laterally where the lumpectomy was there is some induration there is no erythema this is most consistent with normal healing.     LABS and Pathology   na    RADIOLOGY   na    Electronically signed by Michael Ferreira MD on 2/11/2022 at 8:47 AM

## 2022-02-16 ENCOUNTER — HOSPITAL ENCOUNTER (OUTPATIENT)
Dept: NON INVASIVE DIAGNOSTICS | Age: 72
Discharge: HOME | End: 2022-02-16
Payer: MEDICARE

## 2022-02-16 DIAGNOSIS — R94.31 ABNORMAL EKG: ICD-10-CM

## 2022-02-16 DIAGNOSIS — I49.8 SINUS ARRHYTHMIA: ICD-10-CM

## 2022-02-16 DIAGNOSIS — Z01.818 PREOPERATIVE EXAMINATION: ICD-10-CM

## 2022-02-16 LAB
LV EF: 60 %
LVEF MODALITY: NORMAL

## 2022-02-16 PROCEDURE — 93306 TTE W/DOPPLER COMPLETE: CPT

## 2022-02-16 PROCEDURE — 93356 MYOCRD STRAIN IMG SPCKL TRCK: CPT

## 2022-02-27 ASSESSMENT — ENCOUNTER SYMPTOMS
SHORTNESS OF BREATH: 0
CONSTIPATION: 0
DIARRHEA: 0
ABDOMINAL PAIN: 0
NAUSEA: 0
WHEEZING: 0
ABDOMINAL DISTENTION: 0
COLOR CHANGE: 0
COUGH: 0
VOMITING: 0
RECTAL PAIN: 0
TROUBLE SWALLOWING: 0
BLOOD IN STOOL: 0
FACIAL SWELLING: 0
EYE DISCHARGE: 0
CHEST TIGHTNESS: 0
BACK PAIN: 0
SORE THROAT: 0

## 2022-03-14 ENCOUNTER — HOSPITAL ENCOUNTER (OUTPATIENT)
Dept: RADIATION ONCOLOGY | Age: 72
Discharge: HOME | End: 2022-03-14
Payer: MEDICARE

## 2022-03-14 VITALS
OXYGEN SATURATION: 98 % | RESPIRATION RATE: 16 BRPM | WEIGHT: 195 LBS | BODY MASS INDEX: 29.55 KG/M2 | DIASTOLIC BLOOD PRESSURE: 78 MMHG | HEART RATE: 62 BPM | TEMPERATURE: 97.6 F | HEIGHT: 68 IN | SYSTOLIC BLOOD PRESSURE: 172 MMHG

## 2022-03-14 PROCEDURE — 99202 OFFICE O/P NEW SF 15 MIN: CPT | Performed by: RADIOLOGY

## 2022-03-14 NOTE — PROGRESS NOTES
Theodore Jalloh  3/14/2022    Chaperone: No    Advance Directives     Power of  Living Will ACP-Advance Directive ACP-Power of     Not on File Not on File Not on File Not on File          Vitals:    03/14/22 0936   BP: (!) 172/78   Pulse: 62   Resp: 16   Temp: 97.6 °F (36.4 °C)   SpO2: 98%       Wt Readings from Last 1 Encounters:   03/14/22 195 lb (88.5 kg)        Lab Results   Component Value Date    CREATININE 0.76 10/12/2021     Lab Results   Component Value Date    BUN 14 10/12/2021       Mediport: no    Pacemaker/ICD: no    Previous XRT: no    Past Medical History:   Diagnosis Date    Arthritis     Borderline hyperlipidemia     Breast cancer ([facility] [address] 2022    right    Diabetic ulcer of toe of left foot associated with type 2 diabetes mellitus, limited to breakdown of skin ([facility] [address] 08/31/2020    Hypertension     Invasive ductal carcinoma of breast ([facility] [address] 01/2022    right breast    Type II or unspecified type diabetes mellitus without mention of complication, not stated as uncontrolled     prediabetes-no meds     Past Surgical History:   Procedure Laterality Date    BREAST LUMPECTOMY Right 1/19/2022    RIGHT LUMPECTOMY WITH PREOP OPERATIVE NEEDLE LOCALIZATION performed by Maricruz Lindquist MD at / [facility]      sanko - Knee, hip    [facility] [address]  Right 01/10/2022    [facility] [address]  1/10/2022 [facility] Left     [facility]    US GUIDED NEEDLE LOC OF RIGHT BREAST Right 1/19/2022    US GUIDED NEEDLE LOC OF RIGHT BREAST 1/19/2022 Eugenio Hartley MD [facility]       No Known Allergies       Current Outpatient Medications:     anastrozole (ARIMIDEX) 1 MG tablet, Take 1 tablet by mouth daily, Disp: 30 tablet, Rfl: 3    lisinopril-hydroCHLOROthiazide (PRINZIDE;ZESTORETIC) 20-12.5 MG per tablet, Take 1 tablet by mouth 2 times daily, Disp: 180 tablet, Rfl: 3    traMADol (ULTRAM) 50 MG tablet, Take 1 tablet by mouth every 6 hours as needed for Pain for up to 90 days. , Disp: 60 tablet, Rfl: 5    celecoxib (CELEBREX) 200 MG capsule, Take 1 capsule by mouth 2 times daily, Disp: 180 capsule, Rfl: 3    atenolol (TENORMIN) 50 MG tablet, Take 1 tablet by mouth daily, Disp: 90 tablet, Rfl: 3    traZODone (DESYREL) 50 MG tablet, Take 1 tablet by mouth nightly as needed for Sleep, Disp: 30 tablet, Rfl: 5    simvastatin (ZOCOR) 10 MG tablet, Take 1 tablet by mouth nightly, Disp: 90 tablet, Rfl: 3    Handicap Placard MISC, by Does not apply route EXPIRES 5 YEARS, Disp: 1 each, Rfl: 0    therapeutic multivitamin-minerals (THERAGRAN-M) tablet, Take 1 tablet by mouth daily. , Disp: , Rfl:       ADDITIONAL COMMENTS: Seen in Consultation with Dr. Janice Bourgeois.        Haresh Stevens RN BSN

## 2022-04-15 ENCOUNTER — HOSPITAL ENCOUNTER (OUTPATIENT)
Dept: INFUSION THERAPY | Age: 72
Discharge: HOME | End: 2022-04-15
Payer: MEDICARE

## 2022-04-15 ENCOUNTER — OFFICE VISIT (OUTPATIENT)
Dept: ONCOLOGY | Age: 72
End: 2022-04-15
Payer: MEDICARE

## 2022-04-15 VITALS
WEIGHT: 193.8 LBS | OXYGEN SATURATION: 99 % | SYSTOLIC BLOOD PRESSURE: 160 MMHG | DIASTOLIC BLOOD PRESSURE: 72 MMHG | HEIGHT: 68 IN | HEART RATE: 61 BPM | TEMPERATURE: 98.7 F | RESPIRATION RATE: 16 BRPM | BODY MASS INDEX: 29.37 KG/M2

## 2022-04-15 VITALS
DIASTOLIC BLOOD PRESSURE: 72 MMHG | BODY MASS INDEX: 29.37 KG/M2 | HEIGHT: 68 IN | RESPIRATION RATE: 16 BRPM | TEMPERATURE: 98.7 F | SYSTOLIC BLOOD PRESSURE: 160 MMHG | OXYGEN SATURATION: 99 % | WEIGHT: 193.8 LBS | HEART RATE: 61 BPM

## 2022-04-15 DIAGNOSIS — Z98.890 STATUS POST RIGHT BREAST LUMPECTOMY: ICD-10-CM

## 2022-04-15 DIAGNOSIS — Z17.0 MALIGNANT NEOPLASM OF LOWER-OUTER QUADRANT OF RIGHT BREAST OF FEMALE, ESTROGEN RECEPTOR POSITIVE (HCC): Primary | ICD-10-CM

## 2022-04-15 DIAGNOSIS — C50.511 MALIGNANT NEOPLASM OF LOWER-OUTER QUADRANT OF RIGHT BREAST OF FEMALE, ESTROGEN RECEPTOR POSITIVE (HCC): Primary | ICD-10-CM

## 2022-04-15 DIAGNOSIS — Z85.3 ENCOUNTER FOR FOLLOW-UP SURVEILLANCE OF BREAST CANCER: ICD-10-CM

## 2022-04-15 DIAGNOSIS — Z08 ENCOUNTER FOR FOLLOW-UP SURVEILLANCE OF BREAST CANCER: ICD-10-CM

## 2022-04-15 DIAGNOSIS — Z79.811 PROPHYLACTIC USE OF ANASTROZOLE (ARIMIDEX): ICD-10-CM

## 2022-04-15 PROCEDURE — G8399 PT W/DXA RESULTS DOCUMENT: HCPCS | Performed by: INTERNAL MEDICINE

## 2022-04-15 PROCEDURE — 1090F PRES/ABSN URINE INCON ASSESS: CPT | Performed by: INTERNAL MEDICINE

## 2022-04-15 PROCEDURE — G8417 CALC BMI ABV UP PARAM F/U: HCPCS | Performed by: INTERNAL MEDICINE

## 2022-04-15 PROCEDURE — 1036F TOBACCO NON-USER: CPT | Performed by: INTERNAL MEDICINE

## 2022-04-15 PROCEDURE — 3017F COLORECTAL CA SCREEN DOC REV: CPT | Performed by: INTERNAL MEDICINE

## 2022-04-15 PROCEDURE — G9899 SCRN MAM PERF RSLTS DOC: HCPCS | Performed by: INTERNAL MEDICINE

## 2022-04-15 PROCEDURE — 4040F PNEUMOC VAC/ADMIN/RCVD: CPT | Performed by: INTERNAL MEDICINE

## 2022-04-15 PROCEDURE — G8427 DOCREV CUR MEDS BY ELIG CLIN: HCPCS | Performed by: INTERNAL MEDICINE

## 2022-04-15 PROCEDURE — 99214 OFFICE O/P EST MOD 30 MIN: CPT | Performed by: INTERNAL MEDICINE

## 2022-04-15 PROCEDURE — 99211 OFF/OP EST MAY X REQ PHY/QHP: CPT

## 2022-04-15 PROCEDURE — 1123F ACP DISCUSS/DSCN MKR DOCD: CPT | Performed by: INTERNAL MEDICINE

## 2022-04-15 ASSESSMENT — ENCOUNTER SYMPTOMS
FACIAL SWELLING: 0
VOMITING: 0
BACK PAIN: 0
ABDOMINAL DISTENTION: 0
CHEST TIGHTNESS: 0
RECTAL PAIN: 0
CONSTIPATION: 0
SHORTNESS OF BREATH: 0
SORE THROAT: 0
DIARRHEA: 0
NAUSEA: 0
BLOOD IN STOOL: 0
COLOR CHANGE: 0
COUGH: 0
EYE DISCHARGE: 0
ABDOMINAL PAIN: 0
TROUBLE SWALLOWING: 0
WHEEZING: 0

## 2022-04-15 NOTE — PROGRESS NOTES
[facility] [address] [facility] [address], [address] [facility] [address]  [facility]  Dept: [phone]  Dept Fax: [phone]: [phone]    Visit Date:4/15/2022     Will Meraz is a 71 y.o. female who presents today for:   Chief Complaint   Patient presents with    Follow-up     Malignant neoplasm of lower-outer quadrant of right breast of female, estrogen receptor positive         HPI:   This is a 69-year-old patient with newly diagnosed right breast cancer. She presents to the medical oncology clinic to discuss postsurgical treatment. Patient had screening mammogram on January 5, 2022 that showed 5 mm oval density in the central right breast, middle depth. Spot compression views, and LM view and an ultrasound were performed and confirmed the presence of spiculated 4 x 3 x 5 mm hypoechoic mass. Subsequently on January 10, 2022 the patient underwent mass biopsy that revealed well differentiated invasive ductal carcinoma, [place] score 1 of 3. Estrogen Receptor: Positive 95% of cells, Progesterone Receptor: Positive 100% of cells, Ki-67 Percentage of positive nuclei:  15%    HER2 MALAIKA NEGATIVE   She met with the surgeon Dr. Vijaya Smith and after discussing her surgical options she decided to proceed with right breast lumpectomy. Due to her age of 80 is a sentinel lymph node biopsy was omitted, since clinically she had a negative axillary lymph nodes. She had surgery on January 19, 2022, final pathology report showed:  A. Right breast mass, excision:    Invasive ductal carcinoma, [place] grade 1.    Margins free of carcinoma.    Pathologic stage: pT1a. B. Breast, additional caudal margins, excision:    Negative for malignancy.     Fibrocystic changes.       Incidental intraductal papilloma, excised.        Risk assessment:   Onset of menses at at 14  Onset of menopause at [address] child at age of 26  No hormonal therapy     Her postsurgical course was unremarkable  The patient decided to omit her radiation treatment    Interim history on April 15, 2022: In February 2022 the patient started adjuvant hormonal therapy with Arimidex. She reports that overall she tolerates Arimidex well. No arthralgia, no mood changes, no hot flashes. The patient denies any concerns related to her breasts. No hospitalizations since last visit with me.   Her 12 point review of systems is negative  HPI   Past Medical History:   Diagnosis Date    Arthritis     Borderline hyperlipidemia     Breast cancer ([facility] [address] 2022    right    Diabetic ulcer of toe of left foot associated with type 2 diabetes mellitus, limited to breakdown of skin ([facility] [address] 08/31/2020    Hypertension     Invasive ductal carcinoma of breast ([facility] [address] 01/2022    right breast    Type II or unspecified type diabetes mellitus without mention of complication, not stated as uncontrolled     prediabetes-no meds      Past Surgical History:   Procedure Laterality Date    BREAST LUMPECTOMY Right 1/19/2022    RIGHT LUMPECTOMY WITH PREOP OPERATIVE NEEDLE LOCALIZATION performed by Shawn Valvedre MD at [address] [place] [address] - Knee, hip    [facility] [address] Right 01/10/2022    [facility] [address] RIGHT 1/10/2022 [facility]    SHOULDER ARTHROPLASTY Left     [place]    US GUIDED NEEDLE LOC OF RIGHT BREAST Right 1/19/2022    US GUIDED NEEDLE LOC OF RIGHT BREAST 1/19/2022 Neil Bauman MD [facility]      Family History   Problem Relation Age of Onset    Arthritis Mother     Parkinson's Disease Mother     Heart Disease Father     High Blood Pressure Father     Diabetes Father     High Blood Pressure Brother     High Blood Pressure Brother     No Known Problems Maternal Grandmother     Cancer Maternal Grandfather         bone    No Known Problems Paternal Grandmother     No Known Problems Paternal Grandfather     Cancer Maternal Aunt         bone      Social History     Tobacco Use    Smoking status: Never Smoker    Smokeless tobacco: Never Used   Substance Use Topics    Alcohol use: Yes     Comment: very rare      Current Outpatient Medications   Medication Sig Dispense Refill    anastrozole (ARIMIDEX) 1 MG tablet Take 1 tablet by mouth daily 30 tablet 3    lisinopril-hydroCHLOROthiazide (PRINZIDE;ZESTORETIC) 20-12.5 MG per tablet Take 1 tablet by mouth 2 times daily 180 tablet 3    simvastatin (ZOCOR) 10 MG tablet Take 1 tablet by mouth nightly 90 tablet 3    Handicap Placard MISC by Does not apply route EXPIRES 5 YEARS 1 each 0    therapeutic multivitamin-minerals (THERAGRAN-M) tablet Take 1 tablet by mouth daily.  traMADol (ULTRAM) 50 MG tablet Take 1 tablet by mouth every 6 hours as needed for Pain for up to 90 days. 60 tablet 5    celecoxib (CELEBREX) 200 MG capsule Take 1 capsule by mouth 2 times daily 180 capsule 1    atenolol (TENORMIN) 50 MG tablet Take 1 tablet by mouth daily 90 tablet 1    traZODone (DESYREL) 50 MG tablet Take 1 tablet by mouth nightly as needed for Sleep 90 tablet 1     No current facility-administered medications for this visit.       No Known Allergies   Health Maintenance   Topic Date Due    Shingles vaccine (1 of 2) Never done    Diabetic retinal exam  08/23/2017    Diabetic microalbuminuria test  10/12/2022    Lipids  10/12/2022    Diabetic foot exam  10/14/2022    Breast cancer screen  01/19/2023    A1C test (Diabetic or Prediabetic)  04/19/2023    Depression Screen  04/19/2023    Pneumococcal 65+ years Vaccine (2 - PCV) 04/19/2023    Annual Wellness Visit (AWV)  04/20/2023    DTaP/Tdap/Td vaccine (2 - Td or Tdap) 08/11/2023    Colorectal Cancer Screen  05/02/2025    DEXA (modify frequency per FRAX score)  Completed    Flu vaccine  Completed    COVID-19 Vaccine  Completed    Hepatitis A vaccine  Aged Out    Hib vaccine  Aged Out    Meningococcal (ACWY) vaccine  Aged Out    Hepatitis C screen  Discontinued        Subjective:   Review of Systems   Constitutional: Negative for activity change, appetite change, fatigue and fever. HENT: Negative for congestion, dental problem, facial swelling, hearing loss, mouth sores, nosebleeds, sore throat, tinnitus and trouble swallowing. Eyes: Negative for discharge and visual disturbance. Respiratory: Negative for cough, chest tightness, shortness of breath and wheezing. Cardiovascular: Negative for chest pain, palpitations and leg swelling. Gastrointestinal: Negative for abdominal distention, abdominal pain, blood in stool, constipation, diarrhea, nausea, rectal pain and vomiting. Endocrine: Negative for cold intolerance, polydipsia and polyuria. Genitourinary: Negative for decreased urine volume, difficulty urinating, dysuria, flank pain, hematuria and urgency. Musculoskeletal: Negative for arthralgias, back pain, gait problem, joint swelling, myalgias and neck stiffness. Skin: Negative for color change, rash and wound. Neurological: Negative for dizziness, tremors, seizures, speech difficulty, weakness, light-headedness, numbness and headaches. Hematological: Negative for adenopathy. Does not bruise/bleed easily. Psychiatric/Behavioral: Negative for confusion and sleep disturbance. The patient is not nervous/anxious. Objective:   Physical Exam  Vitals reviewed. Constitutional:       General: She is not in acute distress. Appearance: She is well-developed. HENT:      Head: Normocephalic. Mouth/Throat:      Pharynx: No oropharyngeal exudate. Eyes:      General: No scleral icterus. Right eye: No discharge. Left eye: No discharge. Pupils: Pupils are equal, round, and reactive to light. Neck:      Thyroid: No thyromegaly. Vascular: No JVD. Trachea: No tracheal deviation. Cardiovascular:      Rate and Rhythm: Normal rate. Heart sounds: Normal heart sounds. No murmur heard. No friction rub. No gallop.     Pulmonary:      Effort: Pulmonary effort is normal. No respiratory distress. Breath sounds: Normal breath sounds. No stridor. No wheezing or rales. Chest:      Chest wall: No tenderness. Breasts:      Right: Skin change (Lumpectomy incision and nicely healed) present. Abdominal:      General: Bowel sounds are normal. There is no distension. Palpations: Abdomen is soft. There is no mass. Tenderness: There is no abdominal tenderness. There is no rebound. Musculoskeletal:         General: Normal range of motion. Cervical back: Normal range of motion and neck supple. Comments: Good range of motion in all four extremities. Lymphadenopathy:      Cervical: No cervical adenopathy. Skin:     General: Skin is warm. Findings: No erythema or rash. Neurological:      Mental Status: She is alert and oriented to person, place, and time. Cranial Nerves: No cranial nerve deficit. Motor: No abnormal muscle tone. Deep Tendon Reflexes: Reflexes are normal and symmetric. Psychiatric:         Behavior: Behavior normal.         Thought Content:  Thought content normal.         Judgment: Judgment normal.         BP (!) 160/72 (Site: Left Upper Arm, Position: Sitting, Cuff Size: Large Adult)   Pulse 61   Temp 98.7 °F (37.1 °C) (Oral)   Resp 16   Ht 5' 7.5\" (1.715 m)   Wt 193 lb 12.8 oz (87.9 kg)   SpO2 99%   BMI 29.91 kg/m²      ECOG status is 0    Imaging studies and labs:     Lab Results   Component Value Date    WBC 3.9 (L) 09/11/2020    HGB 13.3 01/17/2022    HCT 40.2 01/17/2022    MCV 97.9 09/11/2020     09/11/2020       Chemistry        Component Value Date/Time     10/12/2021 0000    K 5.2 01/19/2022 1034     10/12/2021 0000    CO2 105 10/12/2021 0000    BUN 14 10/12/2021 0000    CREATININE 0.76 10/12/2021 0000        Component Value Date/Time    CALCIUM 9.0 10/12/2021 0000    ALKPHOS 94 10/12/2021 0000    AST 21 10/12/2021 0000    ALT 21 10/12/2021 0000    BILITOT 0.5 10/12/2021 0000        DEXA study in January 2021 showed:  BONE DENSITY WITHIN NORMAL LIMITS    Assessment/Plan: 1. Stage IA (pT1a, pN0, cM0, G1, ER+, ID+, HER2-)  The patient is diagnosed with stage I A hormone responsive right breast cancer. She is status post right lumpectomy with axillary LN biopsy. She presents to the [address] [place] [address] to discuss further management. The patient was informed that she has an early stage of breast cancer that does not require systemic chemotherapy. Since her tumor was smaller than 5 mm in size there is no need to assess it by the Breast OncotypeDx assay. Next, we discuss the role of radiation therapy. I presented the patient with data from two studies that evaluated the benefit of postlumpectomy radiation in older patients with stage I hormone responsive breast cancer. The CALGB 9343 study enrolled patients 79years of age and older. The results showed 10% risk of local disease recurrence at 10 years in women treated with tamoxifen only ( no radiation therapy) in contrast to 2% risk of local disease recurrence at 10 years in women treated with tamoxifen and radiation therapy. However overall survival was the same for both groups. PRIME 2 study, confirmed a modest reduction in recurrence rate with RT that did not translate into a survival benefit. After a median follow-up of five years, ipsilateral breast tumor recurrences were lower in women assigned to RT (1.3 versus 4.1 percent). No differences in overall survival, regional recurrence, distant metastases, contralateral breast cancers, or new breast cancers were noted between the two groups. The patient did to omit postlumpectomy radiation treatment. 2.  Adjuvant hormonal therapy with AI. Started Arimidex in February 2022. Overall she tolerates it well. She denies having any arthralgia no hot flashes no mood changes. Denies any concerns related to her breasts.   There is no evidence of disease recurrence on today's physical examination. No complaints suspicious for metastatic disease. The patient was instructed to continue Arimidex. Had a DEXA study in January 2021 that showed normal bone density. 3.  Breast cancer surveillance. Management of breast cancer survivors who have completed active treatment and have no evidence of disease are cancer surveillance, lifestyle modifications including pursuing healthy regular exercise program, minimizing alcohol intake, and refraining from smoking. Survivor follow-up will include updated history, regular physical examination. Radiologic imaging to screen for distant recurrence will be not performed unless the patient will develop new symptoms. Symptoms suspicious for recurrent /metastic disease include:  ?Constitutional symptoms  Anorexia, weight loss, malaise, fatigue, insomnia. ? Bone pain  ? Pulmonary symptoms  persistent cough or dyspnea (at rest or with exertion). ?Neurologic symptoms  Headache, nausea, vomiting, confusion, weakness, numbness or tingling. ?Gastrointestinal symptoms  Right upper quadrant pain, change in bowel habits, presence of bloody or tarry stools. Diagnosis Orders   1. Malignant neoplasm of lower-outer quadrant of right breast of female, estrogen receptor positive ([facility] [address]     2. Status post right breast lumpectomy     3. Prophylactic use of anastrozole (Arimidex)     4. Encounter for follow-up surveillance of breast cancer          Plan:   No follow-ups on file. Orders Placed:   No orders of the defined types were placed in this encounter. Medications Prescribed:   No orders of the defined types were placed in this encounter. Discussed use, benefit, and side effectsof prescribed medications. All patient questions answered. Pt voiced understanding. Instructed to continue current medications, diet and exercise. Patient agreed with treatment plan. Follow up as directed.     Electronically signed by Karen Mosley MD on 2/8/22 at 9:54 PM EST

## 2022-04-19 ENCOUNTER — OFFICE VISIT (OUTPATIENT)
Dept: FAMILY MEDICINE CLINIC | Age: 72
End: 2022-04-19
Payer: MEDICARE

## 2022-04-19 VITALS
TEMPERATURE: 98.2 F | HEIGHT: 67 IN | BODY MASS INDEX: 30.61 KG/M2 | WEIGHT: 195 LBS | SYSTOLIC BLOOD PRESSURE: 138 MMHG | OXYGEN SATURATION: 99 % | DIASTOLIC BLOOD PRESSURE: 86 MMHG | HEART RATE: 94 BPM

## 2022-04-19 DIAGNOSIS — Z12.11 SCREEN FOR COLON CANCER: ICD-10-CM

## 2022-04-19 DIAGNOSIS — M19.90 ARTHRITIS: Chronic | ICD-10-CM

## 2022-04-19 DIAGNOSIS — E11.9 TYPE 2 DIABETES MELLITUS WITHOUT COMPLICATION, WITHOUT LONG-TERM CURRENT USE OF INSULIN (HCC): ICD-10-CM

## 2022-04-19 DIAGNOSIS — Z23 NEED FOR VACCINATION: ICD-10-CM

## 2022-04-19 DIAGNOSIS — I47.1 SVT (SUPRAVENTRICULAR TACHYCARDIA) (HCC): Chronic | ICD-10-CM

## 2022-04-19 DIAGNOSIS — Z00.00 MEDICARE ANNUAL WELLNESS VISIT, SUBSEQUENT: Primary | ICD-10-CM

## 2022-04-19 DIAGNOSIS — F51.01 PRIMARY INSOMNIA: ICD-10-CM

## 2022-04-19 LAB — HBA1C MFR BLD: 7.4 %

## 2022-04-19 PROCEDURE — 83036 HEMOGLOBIN GLYCOSYLATED A1C: CPT | Performed by: FAMILY MEDICINE

## 2022-04-19 PROCEDURE — 4040F PNEUMOC VAC/ADMIN/RCVD: CPT | Performed by: FAMILY MEDICINE

## 2022-04-19 PROCEDURE — G0009 ADMIN PNEUMOCOCCAL VACCINE: HCPCS | Performed by: FAMILY MEDICINE

## 2022-04-19 PROCEDURE — 90732 PPSV23 VACC 2 YRS+ SUBQ/IM: CPT | Performed by: FAMILY MEDICINE

## 2022-04-19 PROCEDURE — 99214 OFFICE O/P EST MOD 30 MIN: CPT | Performed by: FAMILY MEDICINE

## 2022-04-19 PROCEDURE — 1123F ACP DISCUSS/DSCN MKR DOCD: CPT | Performed by: FAMILY MEDICINE

## 2022-04-19 PROCEDURE — 3017F COLORECTAL CA SCREEN DOC REV: CPT | Performed by: FAMILY MEDICINE

## 2022-04-19 PROCEDURE — G0439 PPPS, SUBSEQ VISIT: HCPCS | Performed by: FAMILY MEDICINE

## 2022-04-19 PROCEDURE — 3051F HG A1C>EQUAL 7.0%<8.0%: CPT | Performed by: FAMILY MEDICINE

## 2022-04-19 RX ORDER — TRAZODONE HYDROCHLORIDE 50 MG/1
50 TABLET ORAL NIGHTLY PRN
Qty: 90 TABLET | Refills: 1 | Status: SHIPPED | OUTPATIENT
Start: 2022-04-19 | End: 2022-10-19 | Stop reason: SDUPTHER

## 2022-04-19 RX ORDER — TRAMADOL HYDROCHLORIDE 50 MG/1
50 TABLET ORAL EVERY 6 HOURS PRN
Qty: 60 TABLET | Refills: 5 | Status: SHIPPED | OUTPATIENT
Start: 2022-04-19 | End: 2022-10-19 | Stop reason: SDUPTHER

## 2022-04-19 RX ORDER — ATENOLOL 50 MG/1
50 TABLET ORAL DAILY
Qty: 90 TABLET | Refills: 1 | Status: SHIPPED | OUTPATIENT
Start: 2022-04-19 | End: 2022-10-03 | Stop reason: SDUPTHER

## 2022-04-19 RX ORDER — CELECOXIB 200 MG/1
200 CAPSULE ORAL 2 TIMES DAILY
Qty: 180 CAPSULE | Refills: 1 | Status: SHIPPED | OUTPATIENT
Start: 2022-04-19 | End: 2022-09-10 | Stop reason: SDUPTHER

## 2022-04-19 SDOH — ECONOMIC STABILITY: FOOD INSECURITY: WITHIN THE PAST 12 MONTHS, YOU WORRIED THAT YOUR FOOD WOULD RUN OUT BEFORE YOU GOT MONEY TO BUY MORE.: NEVER TRUE

## 2022-04-19 SDOH — ECONOMIC STABILITY: FOOD INSECURITY: WITHIN THE PAST 12 MONTHS, THE FOOD YOU BOUGHT JUST DIDN'T LAST AND YOU DIDN'T HAVE MONEY TO GET MORE.: NEVER TRUE

## 2022-04-19 ASSESSMENT — PATIENT HEALTH QUESTIONNAIRE - PHQ9
SUM OF ALL RESPONSES TO PHQ9 QUESTIONS 1 & 2: 0
SUM OF ALL RESPONSES TO PHQ QUESTIONS 1-9: 0
2. FEELING DOWN, DEPRESSED OR HOPELESS: 0
SUM OF ALL RESPONSES TO PHQ QUESTIONS 1-9: 0
1. LITTLE INTEREST OR PLEASURE IN DOING THINGS: 0

## 2022-04-19 ASSESSMENT — LIFESTYLE VARIABLES: HOW OFTEN DO YOU HAVE A DRINK CONTAINING ALCOHOL: NEVER

## 2022-04-19 ASSESSMENT — SOCIAL DETERMINANTS OF HEALTH (SDOH): HOW HARD IS IT FOR YOU TO PAY FOR THE VERY BASICS LIKE FOOD, HOUSING, MEDICAL CARE, AND HEATING?: NOT HARD AT ALL

## 2022-04-19 NOTE — PATIENT INSTRUCTIONS
To protect your eyes from damage due to diabetes, please schedule an appointment with your ophthalmologist. Please let us know if you need help doing this. Personalized Preventive Plan for Ruy Bridges - 4/19/2022  Medicare offers a range of preventive health benefits. Some of the tests and screenings are paid in full while other may be subject to a deductible, co-insurance, and/or copay. Some of these benefits include a comprehensive review of your medical history including lifestyle, illnesses that may run in your family, and various assessments and screenings as appropriate. After reviewing your medical record and screening and assessments performed today your provider may have ordered immunizations, labs, imaging, and/or referrals for you. A list of these orders (if applicable) as well as your Preventive Care list are included within your After Visit Summary for your review. Other Preventive Recommendations:    · A preventive eye exam performed by an eye specialist is recommended every 1-2 years to screen for glaucoma; cataracts, macular degeneration, and other eye disorders. · A preventive dental visit is recommended every 6 months. · Try to get at least 150 minutes of exercise per week or 10,000 steps per day on a pedometer . · Order or download the FREE \"Exercise & Physical Activity: Your Everyday Guide\" from [employer]. Call 0-591.324.7169 or search [employer] online. · You need 9665-6430 mg of calcium and 1773-4036 IU of vitamin D per day. It is possible to meet your calcium requirement with diet alone, but a vitamin D supplement is usually necessary to meet this goal.  · When exposed to the sun, use a sunscreen that protects against both UVA and UVB radiation with an SPF of 30 or greater. Reapply every 2 to 3 hours or after sweating, drying off with a towel, or swimming. · Always wear a seat belt when traveling in a car.  Always wear a helmet when riding a bicycle or motorcycle.

## 2022-04-19 NOTE — PROGRESS NOTES
Medicare Annual Wellness Visit    Drew Fleming is here for Medicare AWV and Diabetes    Assessment & Plan   Medicare annual wellness visit, subsequent  Arthritis  -     traMADol (ULTRAM) 50 MG tablet; Take 1 tablet by mouth every 6 hours as needed for Pain for up to 90 days. , Disp-60 tablet, R-5Normal  -     celecoxib (CELEBREX) 200 MG capsule; Take 1 capsule by mouth 2 times daily, Disp-180 capsule, R-1Normal  SVT (supraventricular tachycardia) (HCC)  -     atenolol (TENORMIN) 50 MG tablet; Take 1 tablet by mouth daily, Disp-90 tablet, R-1Normal  Primary insomnia  -     traZODone (DESYREL) 50 MG tablet; Take 1 tablet by mouth nightly as needed for Sleep, Disp-90 tablet, R-1Normal  Type 2 diabetes mellitus without complication, without long-term current use of insulin (HCC)  -     POCT glycosylated hemoglobin (Hb A1C)  -     Comprehensive Metabolic Panel; Future  -     CBC with Auto Differential; Future  -     Hemoglobin A1C; Future  -     Lipid Panel; Future  -     TSH with Reflex; Future  -     Microalbumin / Creatinine Urine Ratio; Future  Screen for colon cancer  -     Fecal DNA Colorectal cancer screening (Cologuard)  Need for vaccination  -     PNEUMOVAX 23 subcutaneous/IM (Pneumococcal polysaccharide vaccine 23-valent >= 1yo)      Recommendations for Preventive Services Due: see orders and patient instructions/AVS.      Recommended screening schedule for the next 5-10 years is provided to the patient in written form: see Patient Instructions/AVS.     Return in 6 months (on 10/19/2022). Subjective   The following acute and/or chronic problems were also addressed today:    Was in [id number] [facility] for a few weeks. Had a good time. 1.5 months started Arimidex for breast cancer treatment. She seems to be tolerating so far. Does have some increased achiness in the hands and other joints. Still processing cancer diagnosis. Not worried all the time. Able to sleep.   Does not think she is anxious or depressed about it at this time. Trazodone on board helps her with rest well at night. For OA in hands and other joints, uses celebrex and tramadol. No constipation issues with Tramadol. She has had no SVT flareups. Left foot skin infection. Working with Dr. Ankur Bhakta. Not walking/exercising due to pain. It is better. Lab Results   Component Value Date    LABA1C 7.4 04/19/2022    LABA1C 6.7 10/12/2021    LABA1C 6.3 04/13/2021   Not being able to exercise the patient has had an increase in her blood sugars but still under acceptable control. She is hoping the foot situation improves quickly so she can get back to good activity. Patient's complete Health Risk Assessment and screening values have been reviewed and are found in Flowsheets. The following problems were reviewed today and where indicated follow up appointments were made and/or referrals ordered. Positive Risk Factor Screenings with Interventions:               General Health and ACP:  General  In general, how would you say your health is?: Good  In the past 7 days, have you experienced any of the following: New or Increased Pain, New or Increased Fatigue, Loneliness, Social Isolation, Stress or Anger?: (!) Yes  Select all that apply: (!) New or Increased Pain (pain all over)  Do you get the social and emotional support that you need?: Yes  Do you have a Living Will?: Yes    Advance Directives     Power of  Living Will ACP-Advance Directive ACP-Power of     Not on File Not on File Not on File Not on File      General Health Risk Interventions:  · Pain discomfort seems to be mild and tolerable. She is hoping it will be short-term and her body will get used to the medication.     Health Habits/Nutrition:     Physical Activity: Insufficiently Active    Days of Exercise per Week: 2 days    Minutes of Exercise per Session: 30 min     Have you lost any weight without trying in the past 3 months?: No  Body mass index: (!) 30.54  Have you seen the dentist within the past year?: Yes    Wt Readings from Last 3 Encounters:   04/19/22 195 lb (88.5 kg)   04/15/22 193 lb 12.8 oz (87.9 kg)   04/15/22 193 lb 12.8 oz (87.9 kg)     Health Habits/Nutrition Interventions:  · Working on healthy diet and activity especially in setting of cancer diagnosis    Hearing/Vision:  Do you or your family notice any trouble with your hearing that hasn't been managed with hearing aids?: No  Do you have difficulty driving, watching TV, or doing any of your daily activities because of your eyesight?: No  Have you had an eye exam within the past year?: (!) No  No exam data present    Hearing/Vision Interventions:  · Vision concerns:  patient encouraged to make appointment with his/her eye specialist            Objective   Vitals:    04/19/22 0851   BP: 138/86   Site: Left Upper Arm   Position: Sitting   Cuff Size: Medium Adult   Pulse: 94   Temp: 98.2 °F (36.8 °C)   SpO2: 99%   Weight: 195 lb (88.5 kg)   Height: 5' 7\" (1.702 m)      Body mass index is 30.54 kg/m². Gen: NAD, AAO x 3, coherent, pleasant    CTAB. RRR         No Known Allergies  Prior to Visit Medications    Medication Sig Taking? Authorizing Provider   traMADol (ULTRAM) 50 MG tablet Take 1 tablet by mouth every 6 hours as needed for Pain for up to 90 days.  Yes Chiquita Sosa MD   celecoxib (CELEBREX) 200 MG capsule Take 1 capsule by mouth 2 times daily Yes Chiquita Sosa MD   atenolol (TENORMIN) 50 MG tablet Take 1 tablet by mouth daily Yes Chiquita Sosa MD   traZODone (DESYREL) 50 MG tablet Take 1 tablet by mouth nightly as needed for Sleep Yes Chiquita Sosa MD   anastrozole (ARIMIDEX) 1 MG tablet Take 1 tablet by mouth daily Yes Dianne Jung MD   lisinopril-hydroCHLOROthiazide (PRINZIDE;ZESTORETIC) 20-12.5 MG per tablet Take 1 tablet by mouth 2 times daily Yes Chiquita Sosa MD   simvastatin (ZOCOR) 10 MG tablet Take 1 tablet by mouth nightly Yes Chiquita Sosa MD   therapeutic multivitamin-minerals (THERAGRAN-M) tablet Take 1 tablet by mouth daily. Yes Historical Provider, MD   Handicap Placard MISC by Does not apply route EXPIRES 5 Shaylee Fletcher MD   conjugated estrogens (PREMARIN) 0.625 MG/GM vaginal cream Place vaginally daily. As needed.   Mathieu Washburn MD       CareTeam (Including outside providers/suppliers regularly involved in providing care):   Patient Care Team:  Rosy Chino MD as PCP - General (Family Medicine)  Rosy Chino MD as PCP - [facility] Empaneled Provider  Skylar Orr RN as Nurse Navigator    Reviewed and updated this visit:  Tobacco  Allergies  Meds  Problems  Med Hx  Surg Hx  Soc Hx  Fam Hx

## 2022-04-19 NOTE — PROGRESS NOTES
Tanesha Adamed  1950    1. Are you sick today? no  2. Do you have allergies to medications, food, a vaccine component, or latex? no  3. Have you ever had a serious reaction after receiving a vaccination? no  4. Do you have a long-term health problem with heart, lung, kidney, or metabolic disease (e.g. diabetes), asthma, a blood disorder, no spleen, complement component deficiency, a cochlear implant, or a spinal fluid leak? Are you on long-term aspirin therapy? no  5. Do you have cancer, leukemia, HIV/AIDS, or any other immune system problem? no  6. Do you have a parent, brother, or sister with an immune system problem? no  7. In the past 3 months, have you taken medications that affect your immune system, such as prednisone, other steroids, or anticancer drugs; drugs for the treatment of rheumatoid arthritis, Crohn's disease, or psoriasis; or have you had radiation treatment? no  8. Have you had a seizure or a brain or other nervous system problem? no  9. During the past year, have you received a transfusion of blood or blood products, or been given immune (gamma) globulin or an antiviral drug? no  10. For women: Are you pregnant or is there a chance you could become pregnant during the next month? no  11. Have you received any vaccinations in the past 4 weeks? no    Form Completed by:  on 4/19/2022 at 9:30 AM EDT  Form Reviewed by: Hazel Schirmer, MA on 4/19/2022 at 9:30 AM EDT    Did you bring your immunization card with you?  No

## 2022-05-06 LAB — NONINV COLON CA DNA+OCC BLD SCRN STL QL: NEGATIVE

## 2022-06-09 ENCOUNTER — HOSPITAL ENCOUNTER (OUTPATIENT)
Dept: INFUSION THERAPY | Age: 72
Discharge: HOME | End: 2022-06-09
Payer: MEDICARE

## 2022-06-09 ENCOUNTER — OFFICE VISIT (OUTPATIENT)
Dept: ONCOLOGY | Age: 72
End: 2022-06-09
Payer: MEDICARE

## 2022-06-09 VITALS
TEMPERATURE: 98.3 F | DIASTOLIC BLOOD PRESSURE: 71 MMHG | SYSTOLIC BLOOD PRESSURE: 152 MMHG | RESPIRATION RATE: 16 BRPM | OXYGEN SATURATION: 95 % | HEART RATE: 72 BPM

## 2022-06-09 VITALS
DIASTOLIC BLOOD PRESSURE: 71 MMHG | TEMPERATURE: 98.3 F | WEIGHT: 200.2 LBS | RESPIRATION RATE: 16 BRPM | OXYGEN SATURATION: 95 % | HEIGHT: 67 IN | SYSTOLIC BLOOD PRESSURE: 152 MMHG | HEART RATE: 72 BPM | BODY MASS INDEX: 31.42 KG/M2

## 2022-06-09 DIAGNOSIS — C50.511 MALIGNANT NEOPLASM OF LOWER-OUTER QUADRANT OF RIGHT BREAST OF FEMALE, ESTROGEN RECEPTOR POSITIVE (HCC): Primary | ICD-10-CM

## 2022-06-09 DIAGNOSIS — Z17.0 MALIGNANT NEOPLASM OF LOWER-OUTER QUADRANT OF RIGHT BREAST OF FEMALE, ESTROGEN RECEPTOR POSITIVE (HCC): Primary | ICD-10-CM

## 2022-06-09 DIAGNOSIS — Z08 ENCOUNTER FOR FOLLOW-UP SURVEILLANCE OF BREAST CANCER: ICD-10-CM

## 2022-06-09 DIAGNOSIS — Z79.811 PROPHYLACTIC USE OF ANASTROZOLE (ARIMIDEX): ICD-10-CM

## 2022-06-09 DIAGNOSIS — Z98.890 STATUS POST RIGHT BREAST LUMPECTOMY: ICD-10-CM

## 2022-06-09 DIAGNOSIS — Z85.3 ENCOUNTER FOR FOLLOW-UP SURVEILLANCE OF BREAST CANCER: ICD-10-CM

## 2022-06-09 PROCEDURE — 99211 OFF/OP EST MAY X REQ PHY/QHP: CPT

## 2022-06-09 PROCEDURE — G9899 SCRN MAM PERF RSLTS DOC: HCPCS | Performed by: PHYSICIAN ASSISTANT

## 2022-06-09 PROCEDURE — G8399 PT W/DXA RESULTS DOCUMENT: HCPCS | Performed by: PHYSICIAN ASSISTANT

## 2022-06-09 PROCEDURE — 3017F COLORECTAL CA SCREEN DOC REV: CPT | Performed by: PHYSICIAN ASSISTANT

## 2022-06-09 PROCEDURE — 1123F ACP DISCUSS/DSCN MKR DOCD: CPT | Performed by: PHYSICIAN ASSISTANT

## 2022-06-09 PROCEDURE — 99215 OFFICE O/P EST HI 40 MIN: CPT | Performed by: PHYSICIAN ASSISTANT

## 2022-06-09 PROCEDURE — 1036F TOBACCO NON-USER: CPT | Performed by: PHYSICIAN ASSISTANT

## 2022-06-09 PROCEDURE — G8417 CALC BMI ABV UP PARAM F/U: HCPCS | Performed by: PHYSICIAN ASSISTANT

## 2022-06-09 PROCEDURE — 1090F PRES/ABSN URINE INCON ASSESS: CPT | Performed by: PHYSICIAN ASSISTANT

## 2022-06-09 PROCEDURE — G8427 DOCREV CUR MEDS BY ELIG CLIN: HCPCS | Performed by: PHYSICIAN ASSISTANT

## 2022-06-09 RX ORDER — ANASTROZOLE 1 MG/1
1 TABLET ORAL DAILY
Qty: 90 TABLET | Refills: 1 | Status: SHIPPED | OUTPATIENT
Start: 2022-06-09

## 2022-06-09 NOTE — PROGRESS NOTES
[facility] [address] [facility] [address]  [facility] [address]  Dept: [phone]  Dept Fax: [phone]: [phone]      Visit Date:6/9/2022     Mavis Moses is a 71 y.o. female who presents today for:   Survivorship Visit    HPI:   Mavis Moses is a 71 y.o. female with history of breast cancer. The patient has completed treatment at [facility]. Her cancer team includes:  General Surgery: Dr. Frazier Friday Oncology: Dr. Trung Rebollar Oncology: n/a  Plastic Surgery: n/a      Treatment history includes: The patient has a history of right breast cancer. Patient had screening mammogram on January 5, 2022 that showed 5 mm oval density in the central right breast, middle depth. Spot compression views, and LM view and an ultrasound were performed and confirmed the presence of spiculated 4 x 3 x 5 mm hypoechoic mass. Subsequently on January 10, 2022 the patient underwent mass biopsy that revealed well differentiated invasive ductal carcinoma, [place] score 1 of 3. Estrogen Receptor: Positive 95% of cells, Progesterone Receptor: Positive 100% of cells, Ki-67 Percentage of positive nuclei:  15%    HER2 MALAIKA NEGATIVE   She underwent right breast lumpectomy per Dr. Abilio Hess on 1/19/2022. Due to her age of 80 is a sentinel lymph node biopsy was omitted, since clinically she had a negative axillary lymph nodes. Final pathology report showed:  A. Right breast mass, excision:    Invasive ductal carcinoma, Livia grade 1.    Margins free of carcinoma.    Pathologic stage: pT1a. B. Breast, additional caudal margins, excision:    Negative for malignancy.     Fibrocystic changes.       Incidental intraductal papilloma, excised.      Risk assessment:   Onset of menses at at 14  Onset of menopause at [address] child at age of 26  No hormonal therapy      Her postsurgical course was unremarkable. The patient decided to omit her radiation treatment.   She was placed on adjuvant hormonal therapy with Arimidex in February 2022. Interval History 6/9/2022: Today, the patient presents for Survivorship visit. Her treatment history is summarized above. She continues regular follow up with her cancer team.  She states that she has felt well since her last visit in our office in April 2022. She denies any changes to her overall health. She denies any ED visits or hospitalizations. She denies any concerns related to her breasts; no nipple inversion or discharge; no palpable lumps/masses. She denies unintentional weight loss, poor appetite, early satiety, abdominal bloating, new shortness of breath or bone pain. She denies any concerns with lymphedema; no chest wall or upper extremity edema. The patient remains physically active and tries to walk 3 miles per day. Any side effects from AI: yes -the patient affirms intermittent hot flashes. She has history of arthralgias which were chronic and prior to initiation of Arimidex. She feels arthritic pain has not changed since being placed on Arimidex. She denies night sweats, vaginal pain or incontinence. Has any family history changed since last visit? no      PMH, SH, and FH:  I reviewed the patients medication list and allergy list as noted on the electronic medical record. The PMH, SH and FH were also reviewed as noted on the EMR. Review of Systems:   Review of Systems   Pertinent review of systems noted in HPI, all other ROS negative. Objective:   Physical Exam   BP (!) 152/71 (Site: Left Upper Arm, Position: Sitting, Cuff Size: Medium Adult)   Pulse 72   Temp 98.3 °F (36.8 °C) (Oral)   Resp 16   Ht 5' 7\" (1.702 m)   Wt 200 lb 3.2 oz (90.8 kg)   SpO2 95%   BMI 31.36 kg/m²    General appearance: No apparent distress, well developed and cooperative. HEENT: Pupils equal, round, and reactive to light. Conjunctivae/corneas clear. Neck: Supple, with full range of motion. Trachea midline. services including PT Referral, OT Referral, ST Referral as indicated. Not indicated. D. Psychological support including , pastoral care, support groups. E. Genetic Counselor Evaluation: Not indicated. F. Education Opportunities - Patient was provided nutritional handout for breast cancer survivors. G. Smoking Cessation - Not indicated. 4. Follow-Up/Goals   -Medical Oncology on 11/17/2022  -General Surgery, Dr. Karin Jordan, on 7/22/2022    On this date 6/9/2022 I have spent 45 minutes reviewing previous notes, test results and face to face with the patient discussing the diagnosis and importance of compliance with the treatment plan as well as documenting on the day of the visit.     Electronically signed by   Ronnell Flores PA-C

## 2022-06-09 NOTE — PATIENT INSTRUCTIONS
1. Return to clinic in 5 months with Sobia Barlow PA-C  2. Patient instructed to take Arimidex at night  3. Instructed to take glucosamine and chondroitin for joint pain, evening primrose oil for hot flashes  4. Please call for questions or concerns.

## 2022-07-13 ENCOUNTER — NURSE ONLY (OUTPATIENT)
Dept: FAMILY MEDICINE CLINIC | Age: 72
End: 2022-07-13

## 2022-07-13 VITALS — DIASTOLIC BLOOD PRESSURE: 72 MMHG | SYSTOLIC BLOOD PRESSURE: 128 MMHG

## 2022-07-13 DIAGNOSIS — I10 ESSENTIAL HYPERTENSION: Primary | ICD-10-CM

## 2022-07-13 NOTE — PROGRESS NOTES
Patient came to office for blood pressure check after blood pressure was high at oncology visit. Patient blood pressure today was within normal limits.

## 2022-07-22 ENCOUNTER — OFFICE VISIT (OUTPATIENT)
Dept: SURGERY | Age: 72
End: 2022-07-22
Payer: MEDICARE

## 2022-07-22 VITALS
SYSTOLIC BLOOD PRESSURE: 122 MMHG | WEIGHT: 197.7 LBS | RESPIRATION RATE: 16 BRPM | DIASTOLIC BLOOD PRESSURE: 76 MMHG | HEIGHT: 67 IN | OXYGEN SATURATION: 98 % | HEART RATE: 60 BPM | BODY MASS INDEX: 31.03 KG/M2 | TEMPERATURE: 96.7 F

## 2022-07-22 DIAGNOSIS — C50.511 MALIGNANT NEOPLASM OF LOWER-OUTER QUADRANT OF RIGHT BREAST OF FEMALE, ESTROGEN RECEPTOR POSITIVE (HCC): Primary | ICD-10-CM

## 2022-07-22 DIAGNOSIS — Z17.0 MALIGNANT NEOPLASM OF LOWER-OUTER QUADRANT OF RIGHT BREAST OF FEMALE, ESTROGEN RECEPTOR POSITIVE (HCC): Primary | ICD-10-CM

## 2022-07-22 DIAGNOSIS — Z12.31 SCREENING MAMMOGRAM FOR BREAST CANCER: ICD-10-CM

## 2022-07-22 PROCEDURE — G8427 DOCREV CUR MEDS BY ELIG CLIN: HCPCS | Performed by: SURGERY

## 2022-07-22 PROCEDURE — 1123F ACP DISCUSS/DSCN MKR DOCD: CPT | Performed by: SURGERY

## 2022-07-22 PROCEDURE — G8399 PT W/DXA RESULTS DOCUMENT: HCPCS | Performed by: SURGERY

## 2022-07-22 PROCEDURE — 99213 OFFICE O/P EST LOW 20 MIN: CPT | Performed by: SURGERY

## 2022-07-22 PROCEDURE — 3017F COLORECTAL CA SCREEN DOC REV: CPT | Performed by: SURGERY

## 2022-07-22 PROCEDURE — 1036F TOBACCO NON-USER: CPT | Performed by: SURGERY

## 2022-07-22 PROCEDURE — 1090F PRES/ABSN URINE INCON ASSESS: CPT | Performed by: SURGERY

## 2022-07-22 PROCEDURE — G8417 CALC BMI ABV UP PARAM F/U: HCPCS | Performed by: SURGERY

## 2022-07-22 PROCEDURE — G9899 SCRN MAM PERF RSLTS DOC: HCPCS | Performed by: SURGERY

## 2022-08-01 ASSESSMENT — ENCOUNTER SYMPTOMS
GASTROINTESTINAL NEGATIVE: 1
ALLERGIC/IMMUNOLOGIC NEGATIVE: 1
RESPIRATORY NEGATIVE: 1

## 2022-08-01 NOTE — PROGRESS NOTES
Landon Sue MD  [address]  Surgery  Clinic Post op Note    Pt Name: Dionicio Alva  Medical Record Number: [id number]  Date of Birth 1950   Today's Date: 8/1/2022    ASSESSMENT       ICD-10-CM    1. Malignant neoplasm of lower-outer quadrant of right breast of female, estrogen receptor positive ([facility] [address]  C50.511     Z17.0       2. Screening mammogram for breast cancer  Z12.31 SHELL DIGITAL SCREEN W OR WO CAD BILATERAL           PLAN   Patient making a good recovery no signs of recurrence, patient tolerating her and hormonal therapy. She will need to see me back in 6 months time with repeat mammography. Stanley Marks returns today for 6-month follow-up after lumpectomy for T1a breast cancer ER/IA positive HER2 negative. Since her surgery she is been on Arimidex she has been tolerating it well, she denies any side effects worse than her baseline arthritis. Denies any nipople discharge, denies palpable masses. Denies any weight loss. Cancer Staging  Malignant neoplasm of lower-outer quadrant of breast of female, estrogen receptor positive ([facility] [address]  Staging form: Breast, AJCC 8th Edition  - Clinical stage from 1/14/2022: Stage IA (cT1a, cN0, cM0, G1, ER+, IA+, HER2-) - Signed by Landon Sue MD on 1/14/2022  - Pathologic stage from 1/28/2022: Stage IA (pT1a, pN0, cM0, G1, ER+, IA+, HER2-) - Signed by Landon Sue MD on 2/8/2022      Review of Systems   Constitutional:  Negative for activity change, appetite change, chills and diaphoresis. HENT: Negative. Respiratory: Negative. Cardiovascular: Negative. Gastrointestinal: Negative. Genitourinary: Negative. Musculoskeletal:  Positive for arthralgias and joint swelling. Skin: Negative. Allergic/Immunologic: Negative. Neurological: Negative. Hematological: Negative. Psychiatric/Behavioral: Negative.        CURRENT MEDICATIONS     Current Outpatient Medications on File Prior to Visit Medication Sig Dispense Refill    anastrozole (ARIMIDEX) 1 MG tablet Take 1 tablet by mouth daily 90 tablet 1    celecoxib (CELEBREX) 200 MG capsule Take 1 capsule by mouth 2 times daily 180 capsule 1    atenolol (TENORMIN) 50 MG tablet Take 1 tablet by mouth daily 90 tablet 1    traZODone (DESYREL) 50 MG tablet Take 1 tablet by mouth nightly as needed for Sleep 90 tablet 1    lisinopril-hydroCHLOROthiazide (PRINZIDE;ZESTORETIC) 20-12.5 MG per tablet Take 1 tablet by mouth 2 times daily 180 tablet 3    simvastatin (ZOCOR) 10 MG tablet Take 1 tablet by mouth nightly 90 tablet 3    Handicap Placard MISC by Does not apply route EXPIRES 5 YEARS 1 each 0    therapeutic multivitamin-minerals (THERAGRAN-M) tablet Take 1 tablet by mouth daily. traMADol (ULTRAM) 50 MG tablet Take 1 tablet by mouth every 6 hours as needed for Pain for up to 90 days. 60 tablet 5    [DISCONTINUED] conjugated estrogens (PREMARIN) 0.625 MG/GM vaginal cream Place vaginally daily. As needed. 1 Tube 3     No current facility-administered medications on file prior to visit. OBJECTIVE   CURRENT VITALS:  height is 5' 7\" (1.702 m) and weight is 197 lb 11.2 oz (89.7 kg). Her temporal temperature is 96.7 °F (35.9 °C) (abnormal). Her blood pressure is 122/76 and her pulse is 60. Her respiration is 16 and oxygen saturation is 98%. Physical Exam  Constitutional:       Appearance: She is well-developed. HENT:      Head: Normocephalic and atraumatic. Mouth/Throat:      Mouth: Mucous membranes are moist.      Pharynx: No oropharyngeal exudate. Eyes:      General: No scleral icterus. Extraocular Movements: Extraocular movements intact. Pupils: Pupils are equal, round, and reactive to light. Neck:      Thyroid: No thyromegaly. Trachea: No tracheal deviation. Cardiovascular:      Rate and Rhythm: Normal rate. Pulses: Normal pulses. Pulmonary:      Effort: Pulmonary effort is normal. No respiratory distress. Chest:   Breasts:     Right: Normal. No swelling, bleeding, inverted nipple, mass, nipple discharge, skin change, axillary adenopathy or supraclavicular adenopathy. Left: No swelling, bleeding, inverted nipple, mass, nipple discharge, skin change, axillary adenopathy or supraclavicular adenopathy. Comments: Well-healed incision no seroma  Abdominal:      Palpations: Abdomen is soft. Tenderness: There is no abdominal tenderness. There is no guarding. Hernia: No hernia is present. Musculoskeletal:         General: No deformity. Normal range of motion. Cervical back: Normal range of motion and neck supple. Lymphadenopathy:      Upper Body:      Right upper body: No supraclavicular or axillary adenopathy. Left upper body: No supraclavicular or axillary adenopathy. Skin:     General: Skin is warm. Findings: No rash. Neurological:      General: No focal deficit present. Mental Status: She is alert and oriented to person, place, and time. Psychiatric:         Mood and Affect: Mood normal.         Speech: Speech normal.         Behavior: Behavior normal.         Thought Content:  Thought content normal.        LABS and Pathology   na    RADIOLOGY   na    Electronically signed by Landon Sue MD on 8/1/2022 at 3:00 PM

## 2022-08-29 ENCOUNTER — TELEPHONE (OUTPATIENT)
Dept: FAMILY MEDICINE CLINIC | Age: 72
End: 2022-08-29

## 2022-08-29 DIAGNOSIS — M19.90 ARTHRITIS: Primary | ICD-10-CM

## 2022-08-29 RX ORDER — ACETAMINOPHEN AND CODEINE PHOSPHATE 300; 30 MG/1; MG/1
1 TABLET ORAL 2 TIMES DAILY PRN
Qty: 60 TABLET | Refills: 0 | Status: SHIPPED | OUTPATIENT
Start: 2022-08-29 | End: 2022-09-28

## 2022-08-29 NOTE — TELEPHONE ENCOUNTER
Called and spoke with patient about fax we received from her pharmacy stating Tramadol is back ordered. Per Dr Monte Spring request I have called patient and asked if Tylenol with Codeine would be ok. Patient states she has never had it but she will try it.
T#3 60 tabs NRF given as a substitution for Tramadol. Hopefully next month she will get Tramadol back.
(4) no limitation

## 2022-09-06 DIAGNOSIS — F51.01 PRIMARY INSOMNIA: ICD-10-CM

## 2022-09-06 DIAGNOSIS — E11.9 TYPE 2 DIABETES MELLITUS WITHOUT COMPLICATION, WITHOUT LONG-TERM CURRENT USE OF INSULIN (HCC): ICD-10-CM

## 2022-09-06 DIAGNOSIS — I10 ESSENTIAL HYPERTENSION: ICD-10-CM

## 2022-09-06 DIAGNOSIS — M19.90 ARTHRITIS: Chronic | ICD-10-CM

## 2022-09-06 NOTE — TELEPHONE ENCOUNTER
Jhony Hobbs is requesting a refill on the following medications:  Requested Prescriptions     Pending Prescriptions Disp Refills    lisinopril-hydroCHLOROthiazide (PRINZIDE;ZESTORETIC) 20-12.5 MG per tablet 180 tablet 3     Sig: Take 1 tablet by mouth 2 times daily    traZODone (DESYREL) 50 MG tablet 90 tablet 1     Sig: Take 1 tablet by mouth nightly as needed for Sleep    celecoxib (CELEBREX) 200 MG capsule 180 capsule 1     Sig: Take 1 capsule by mouth 2 times daily    traMADol (ULTRAM) 50 MG tablet 60 tablet 5     Sig: Take 1 tablet by mouth every 6 hours as needed for Pain for up to 90 days.        Date of last visit: 4/19/2022  Date of next visit (if applicable):10/19/2022  Date of last refill: 4/19/2022  Pharmacy Name: [facility] MA

## 2022-09-09 RX ORDER — TRAMADOL HYDROCHLORIDE 50 MG/1
50 TABLET ORAL EVERY 6 HOURS PRN
Qty: 60 TABLET | Refills: 5 | OUTPATIENT
Start: 2022-09-09 | End: 2022-12-08

## 2022-09-09 RX ORDER — LISINOPRIL AND HYDROCHLOROTHIAZIDE 20; 12.5 MG/1; MG/1
1 TABLET ORAL 2 TIMES DAILY
Qty: 180 TABLET | Refills: 3 | OUTPATIENT
Start: 2022-09-09

## 2022-09-09 RX ORDER — TRAZODONE HYDROCHLORIDE 50 MG/1
50 TABLET ORAL NIGHTLY PRN
Qty: 90 TABLET | Refills: 1 | OUTPATIENT
Start: 2022-09-09

## 2022-09-09 NOTE — TELEPHONE ENCOUNTER
Concepcion Angela is requesting a refill on the following medications:  Requested Prescriptions     Pending Prescriptions Disp Refills    celecoxib (CELEBREX) 200 MG capsule 180 capsule 1     Sig: Take 1 capsule by mouth 2 times daily     Refused Prescriptions Disp Refills    lisinopril-hydroCHLOROthiazide (PRINZIDE;ZESTORETIC) 20-12.5 MG per tablet 180 tablet 3     Sig: Take 1 tablet by mouth 2 times daily     Refused By: Kelin Gavin     Reason for Refusal: Patient has requested refill too soon    traZODone (DESYREL) 50 MG tablet 90 tablet 1     Sig: Take 1 tablet by mouth nightly as needed for Sleep     Refused By: Kelin Gavin     Reason for Refusal: Patient has requested refill too soon    traMADol (ULTRAM) 50 MG tablet 60 tablet 5     Sig: Take 1 tablet by mouth every 6 hours as needed for Pain for up to 90 days.      Refused By: Kelin Gavin     Reason for Refusal: Patient has requested refill too soon       Date of last visit: 4/19/2022  Date of next visit (if applicable):10/19/2022  Date of last refill: 4/19/2022  Pharmacy Name: [address] and she stated she has enough for the other three but her Celebrex is completely out       Thanks,  Shantal Hennessy MA

## 2022-09-10 RX ORDER — CELECOXIB 200 MG/1
200 CAPSULE ORAL 2 TIMES DAILY
Qty: 180 CAPSULE | Refills: 0 | Status: SHIPPED | OUTPATIENT
Start: 2022-09-10 | End: 2022-10-19 | Stop reason: SDUPTHER

## 2022-10-03 DIAGNOSIS — I47.1 SVT (SUPRAVENTRICULAR TACHYCARDIA) (HCC): Chronic | ICD-10-CM

## 2022-10-03 RX ORDER — ATENOLOL 50 MG/1
50 TABLET ORAL DAILY
Qty: 90 TABLET | Refills: 0 | Status: SHIPPED | OUTPATIENT
Start: 2022-10-03 | End: 2022-10-19 | Stop reason: SDUPTHER

## 2022-10-03 NOTE — TELEPHONE ENCOUNTER
Ju Villalta called in requesting a refill on Atenolol to be called into [facility]. Has a week left but doesn't have her appt. Until 10/19.

## 2022-10-03 NOTE — TELEPHONE ENCOUNTER
Armando Bonilla is requesting a refill on the following medications:  Requested Prescriptions     Pending Prescriptions Disp Refills    atenolol (TENORMIN) 50 MG tablet 90 tablet 1     Sig: Take 1 tablet by mouth daily       Date of last visit: 4/19/2022  Date of next visit (if applicable):10/19/2022  Date of last refill: 4/19/2022  Pharmacy Name: [address] [place] [address] Mail Order.       Thanks,  China Irving LPN

## 2022-10-11 ENCOUNTER — HOSPITAL ENCOUNTER (OUTPATIENT)
Age: 72
Discharge: HOME | End: 2022-10-11
Payer: MEDICARE

## 2022-10-11 DIAGNOSIS — E11.9 TYPE 2 DIABETES MELLITUS WITHOUT COMPLICATION, WITHOUT LONG-TERM CURRENT USE OF INSULIN (HCC): ICD-10-CM

## 2022-10-11 LAB
ALBUMIN SERPL-MCNC: 3.9 G/DL (ref 3.5–5.1)
ALP BLD-CCNC: 101 U/L (ref 38–126)
ALT SERPL-CCNC: 21 U/L (ref 11–66)
ANION GAP SERPL CALCULATED.3IONS-SCNC: 11 MEQ/L (ref 8–16)
AST SERPL-CCNC: 21 U/L (ref 5–40)
AVERAGE GLUCOSE: 168 MG/DL (ref 70–126)
BASOPHILS # BLD: 0.5 %
BASOPHILS ABSOLUTE: 0 THOU/MM3 (ref 0–0.1)
BILIRUB SERPL-MCNC: 0.4 MG/DL (ref 0.3–1.2)
BUN BLDV-MCNC: 22 MG/DL (ref 7–22)
CALCIUM SERPL-MCNC: 9.7 MG/DL (ref 8.5–10.5)
CHLORIDE BLD-SCNC: 99 MEQ/L (ref 98–111)
CHOLESTEROL, TOTAL: 175 MG/DL (ref 100–199)
CO2: 29 MEQ/L (ref 23–33)
CREAT SERPL-MCNC: 1 MG/DL (ref 0.4–1.2)
CREATININE, URINE: 90.5 MG/DL
EOSINOPHIL # BLD: 3.4 %
EOSINOPHILS ABSOLUTE: 0.1 THOU/MM3 (ref 0–0.4)
ERYTHROCYTE [DISTWIDTH] IN BLOOD BY AUTOMATED COUNT: 12.1 % (ref 11.5–14.5)
ERYTHROCYTE [DISTWIDTH] IN BLOOD BY AUTOMATED COUNT: 41.2 FL (ref 35–45)
GFR SERPL CREATININE-BSD FRML MDRD: 55 ML/MIN/1.73M2
GLUCOSE BLD-MCNC: 183 MG/DL (ref 70–108)
HBA1C MFR BLD: 7.6 % (ref 4.4–6.4)
HCT VFR BLD CALC: 38.5 % (ref 37–47)
HDLC SERPL-MCNC: 42 MG/DL
HEMOGLOBIN: 12.9 GM/DL (ref 12–16)
IMMATURE GRANS (ABS): 0 THOU/MM3 (ref 0–0.07)
IMMATURE GRANULOCYTES: 0 %
LDL CHOLESTEROL CALCULATED: 103 MG/DL
LYMPHOCYTES # BLD: 34.3 %
LYMPHOCYTES ABSOLUTE: 1.3 THOU/MM3 (ref 1–4.8)
MCH RBC QN AUTO: 31.1 PG (ref 26–33)
MCHC RBC AUTO-ENTMCNC: 33.5 GM/DL (ref 32.2–35.5)
MCV RBC AUTO: 92.8 FL (ref 81–99)
MICROALBUMIN UR-MCNC: < 1.2 MG/DL
MICROALBUMIN/CREAT UR-RTO: 13 MG/G (ref 0–30)
MONOCYTES # BLD: 6.9 %
MONOCYTES ABSOLUTE: 0.3 THOU/MM3 (ref 0.4–1.3)
NUCLEATED RED BLOOD CELLS: 0 /100 WBC
PLATELET # BLD: 205 THOU/MM3 (ref 130–400)
PMV BLD AUTO: 9.6 FL (ref 9.4–12.4)
POTASSIUM SERPL-SCNC: 5 MEQ/L (ref 3.5–5.2)
RBC # BLD: 4.15 MILL/MM3 (ref 4.2–5.4)
SEG NEUTROPHILS: 54.9 %
SEGMENTED NEUTROPHILS ABSOLUTE COUNT: 2.1 THOU/MM3 (ref 1.8–7.7)
SODIUM BLD-SCNC: 139 MEQ/L (ref 135–145)
TOTAL PROTEIN: 6.4 G/DL (ref 6.1–8)
TRIGL SERPL-MCNC: 151 MG/DL (ref 0–199)
TSH SERPL DL<=0.05 MIU/L-ACNC: 3.76 UIU/ML (ref 0.4–4.2)
WBC # BLD: 3.8 THOU/MM3 (ref 4.8–10.8)

## 2022-10-11 PROCEDURE — 82043 UR ALBUMIN QUANTITATIVE: CPT

## 2022-10-11 PROCEDURE — 80053 COMPREHEN METABOLIC PANEL: CPT

## 2022-10-11 PROCEDURE — 84443 ASSAY THYROID STIM HORMONE: CPT

## 2022-10-11 PROCEDURE — 85025 COMPLETE CBC W/AUTO DIFF WBC: CPT

## 2022-10-11 PROCEDURE — 36415 COLL VENOUS BLD VENIPUNCTURE: CPT

## 2022-10-11 PROCEDURE — 83036 HEMOGLOBIN GLYCOSYLATED A1C: CPT

## 2022-10-11 PROCEDURE — 80061 LIPID PANEL: CPT

## 2022-10-19 ENCOUNTER — OFFICE VISIT (OUTPATIENT)
Dept: FAMILY MEDICINE CLINIC | Age: 72
End: 2022-10-19
Payer: MEDICARE

## 2022-10-19 VITALS
TEMPERATURE: 97.5 F | HEIGHT: 67 IN | OXYGEN SATURATION: 99 % | SYSTOLIC BLOOD PRESSURE: 144 MMHG | RESPIRATION RATE: 14 BRPM | WEIGHT: 198.8 LBS | BODY MASS INDEX: 31.2 KG/M2 | DIASTOLIC BLOOD PRESSURE: 86 MMHG | HEART RATE: 95 BPM

## 2022-10-19 DIAGNOSIS — I47.1 SVT (SUPRAVENTRICULAR TACHYCARDIA) (HCC): Chronic | ICD-10-CM

## 2022-10-19 DIAGNOSIS — F51.01 PRIMARY INSOMNIA: ICD-10-CM

## 2022-10-19 DIAGNOSIS — I10 ESSENTIAL HYPERTENSION: ICD-10-CM

## 2022-10-19 DIAGNOSIS — E78.00 PURE HYPERCHOLESTEROLEMIA: Chronic | ICD-10-CM

## 2022-10-19 DIAGNOSIS — E11.9 TYPE 2 DIABETES MELLITUS WITHOUT COMPLICATION, WITHOUT LONG-TERM CURRENT USE OF INSULIN (HCC): Primary | ICD-10-CM

## 2022-10-19 DIAGNOSIS — Z01.818 PREOPERATIVE EXAMINATION: ICD-10-CM

## 2022-10-19 DIAGNOSIS — M19.90 ARTHRITIS: Chronic | ICD-10-CM

## 2022-10-19 PROCEDURE — G8484 FLU IMMUNIZE NO ADMIN: HCPCS | Performed by: FAMILY MEDICINE

## 2022-10-19 PROCEDURE — 3017F COLORECTAL CA SCREEN DOC REV: CPT | Performed by: FAMILY MEDICINE

## 2022-10-19 PROCEDURE — 99214 OFFICE O/P EST MOD 30 MIN: CPT | Performed by: FAMILY MEDICINE

## 2022-10-19 PROCEDURE — 2022F DILAT RTA XM EVC RTNOPTHY: CPT | Performed by: FAMILY MEDICINE

## 2022-10-19 PROCEDURE — G8417 CALC BMI ABV UP PARAM F/U: HCPCS | Performed by: FAMILY MEDICINE

## 2022-10-19 PROCEDURE — 3051F HG A1C>EQUAL 7.0%<8.0%: CPT | Performed by: FAMILY MEDICINE

## 2022-10-19 PROCEDURE — 1036F TOBACCO NON-USER: CPT | Performed by: FAMILY MEDICINE

## 2022-10-19 PROCEDURE — G8427 DOCREV CUR MEDS BY ELIG CLIN: HCPCS | Performed by: FAMILY MEDICINE

## 2022-10-19 PROCEDURE — 1123F ACP DISCUSS/DSCN MKR DOCD: CPT | Performed by: FAMILY MEDICINE

## 2022-10-19 PROCEDURE — 1090F PRES/ABSN URINE INCON ASSESS: CPT | Performed by: FAMILY MEDICINE

## 2022-10-19 PROCEDURE — G8399 PT W/DXA RESULTS DOCUMENT: HCPCS | Performed by: FAMILY MEDICINE

## 2022-10-19 PROCEDURE — G9899 SCRN MAM PERF RSLTS DOC: HCPCS | Performed by: FAMILY MEDICINE

## 2022-10-19 RX ORDER — SIMVASTATIN 10 MG
10 TABLET ORAL NIGHTLY
Qty: 90 TABLET | Refills: 3 | Status: SHIPPED | OUTPATIENT
Start: 2022-10-19

## 2022-10-19 RX ORDER — ATENOLOL 50 MG/1
50 TABLET ORAL DAILY
Qty: 90 TABLET | Refills: 3 | Status: SHIPPED | OUTPATIENT
Start: 2022-10-19 | End: 2023-04-16

## 2022-10-19 RX ORDER — CELECOXIB 200 MG/1
200 CAPSULE ORAL 2 TIMES DAILY
Qty: 180 CAPSULE | Refills: 3 | Status: SHIPPED | OUTPATIENT
Start: 2022-10-19 | End: 2023-04-16

## 2022-10-19 RX ORDER — LISINOPRIL AND HYDROCHLOROTHIAZIDE 20; 12.5 MG/1; MG/1
1 TABLET ORAL 2 TIMES DAILY
Qty: 180 TABLET | Refills: 3 | Status: SHIPPED | OUTPATIENT
Start: 2022-10-19

## 2022-10-19 RX ORDER — TRAZODONE HYDROCHLORIDE 50 MG/1
50 TABLET ORAL NIGHTLY PRN
Qty: 90 TABLET | Refills: 3 | Status: SHIPPED | OUTPATIENT
Start: 2022-10-19

## 2022-10-19 RX ORDER — TRAMADOL HYDROCHLORIDE 50 MG/1
50 TABLET ORAL EVERY 6 HOURS PRN
Qty: 60 TABLET | Refills: 5 | Status: ON HOLD | OUTPATIENT
Start: 2022-10-19 | End: 2022-11-01 | Stop reason: HOSPADM

## 2022-10-19 ASSESSMENT — ENCOUNTER SYMPTOMS: SHORTNESS OF BREATH: 0

## 2022-10-19 NOTE — PROGRESS NOTES
Health Maintenance Due   Topic Date Due    Diabetic retinal exam  08/23/2017    Diabetic foot exam  10/14/2022

## 2022-10-19 NOTE — PROGRESS NOTES
Servando Cintron (:  1950) is a 71 y.o. female,Established patient, here for evaluation of the following chief complaint(s):  6 Month Follow-Up (DM) and Pre-op Exam (Left Foot surgery - Dr Sarah Barrios 22)       ASSESSMENT/PLAN:  1. Type 2 diabetes mellitus without complication, without long-term current use of insulin (HCC)  -     lisinopril-hydroCHLOROthiazide (PRINZIDE;ZESTORETIC) 20-12.5 MG per tablet; Take 1 tablet by mouth 2 times daily, Disp-180 tablet, R-3Normal  -     Basic Metabolic Panel; Future  -     Hemoglobin A1C; Future  2. SVT (supraventricular tachycardia) (HCC)  -     atenolol (TENORMIN) 50 MG tablet; Take 1 tablet by mouth daily, Disp-90 tablet, R-3Normal  -     Basic Metabolic Panel; Future  -     Hemoglobin A1C; Future  3. Arthritis  -     traMADol (ULTRAM) 50 MG tablet; Take 1 tablet by mouth every 6 hours as needed for Pain for up to 90 days. , Disp-60 tablet, R-5Normal  -     celecoxib (CELEBREX) 200 MG capsule; Take 1 capsule by mouth 2 times daily, Disp-180 capsule, R-3Normal  -     Basic Metabolic Panel; Future  -     Hemoglobin A1C; Future  4. Essential hypertension  -     lisinopril-hydroCHLOROthiazide (PRINZIDE;ZESTORETIC) 20-12.5 MG per tablet; Take 1 tablet by mouth 2 times daily, Disp-180 tablet, R-3Normal  -     Basic Metabolic Panel; Future  -     Hemoglobin A1C; Future  5. Pure hypercholesterolemia  -     simvastatin (ZOCOR) 10 MG tablet; Take 1 tablet by mouth nightly, Disp-90 tablet, R-3Normal  -     Basic Metabolic Panel; Future  -     Hemoglobin A1C; Future  6. Primary insomnia  -     traZODone (DESYREL) 50 MG tablet; Take 1 tablet by mouth nightly as needed for Sleep, Disp-90 tablet, R-3Normal  -     Basic Metabolic Panel; Future  -     Hemoglobin A1C; Future  7. Preoperative examination    Dietary counseling given -- reviewed patient's current eating patterns.   Discussed benefits (both short-term and long-term) of a healthy eating pattern for patient conditions and prevention of others. Specific recommendations given to patient based on agreed goals for diet change. -- focused on increasing water and lower sugar chocolate. -- adjust with more fiber and activity later    Patient has acceptable risk for surgery. Chronic health conditions are optimized. She will stop Celebrex 5 days prior to surgery. Not on aspirin at this time. Repeat blood pressure was not significantly different. Patient is cardiovascularly stable. This is acceptable surgery. Patient needs to check her blood pressure outside of clinic to make sure it is not controlled. Recommend she come back in a month or so to follow-up on her blood pressure for nurse visit. Return in about 6 months (around 4/20/2023) for AWV with labs. Subjective   SUBJECTIVE/OBJECTIVE:  HPI    Stressful year. Diagnosed with breast cancer 1/2022. July had scammed out of money. Even with foot trouble, walking daily. Due to OA and deformity, will be having foot surgery. Will be off for a bit but thinks her recovery should be shorter. Patient has history of hypertension, type 2 diabetes and SVT which are not optimally treated. She has had no flareups of SVT lately. Activity has been somewhat decreased due to her foot troubles. She still pushes herself to take some walks and cleaning her house without any shortness of breath, chest pain or dizziness. She will be likely getting an anesthetic block and some sedation. Needs preop clerance. Labs reviewed: Trend of a1c up and trigs up, low hdl. Eating more sugar and chocolate. Hard to get motivated  A lot of changes in life. Breast cancer -- following with oncology . hot flashes with arimidex.     BP Readings from Last 10 Encounters:   10/19/22 (!) 144/86   07/22/22 122/76   07/13/22 128/72   06/09/22 (!) 152/71   06/09/22 (!) 152/71   04/19/22 138/86   04/15/22 (!) 160/72   04/15/22 (!) 160/72   03/14/22 (!) 172/78   02/11/22 128/62     Lab Results Component Value Date    LABA1C 7.6 (H) 10/11/2022    LABA1C 7.4 04/19/2022    LABA1C 6.7 10/12/2021     Wt Readings from Last 3 Encounters:   10/19/22 198 lb 12.8 oz (90.2 kg)   07/22/22 197 lb 11.2 oz (89.7 kg)   06/09/22 200 lb 3.2 oz (90.8 kg)     Past Surgical History:   Procedure Laterality Date    BREAST LUMPECTOMY Right 1/19/2022    RIGHT LUMPECTOMY WITH PREOP OPERATIVE NEEDLE LOCALIZATION performed by Lacey Mcdonough MD at [place] [address]      sanko - Knee, hip    SHELL [address] Right 01/10/2022    SHELL [address] 1/10/2022 [address] NEEDLE LOC OF RIGHT BREAST Right 1/19/2022    US GUIDED NEEDLE LOC OF RIGHT BREAST 1/19/2022 Meredith Estrada MD [facility]         Review of Systems   Constitutional:  Negative for fatigue and fever. Respiratory:  Negative for shortness of breath. Cardiovascular:  Negative for chest pain and leg swelling. Musculoskeletal:  Positive for arthralgias and joint swelling. Objective   Physical Exam  Constitutional:       General: She is not in acute distress. Appearance: Normal appearance. She is not ill-appearing. Cardiovascular:      Rate and Rhythm: Normal rate and regular rhythm. Heart sounds: No murmur heard. Pulmonary:      Effort: Pulmonary effort is normal. No respiratory distress. Breath sounds: Normal breath sounds. No wheezing. Musculoskeletal:         General: No swelling. Neurological:      Mental Status: She is alert and oriented to person, place, and time.    Psychiatric:         Mood and Affect: Mood normal.          Hospital Outpatient Visit on 10/11/2022   Component Date Value Ref Range Status    Glucose 10/11/2022 183 (A)  70 - 108 mg/dL Final    Creatinine 10/11/2022 1.0  0.4 - 1.2 mg/dL Final    BUN 10/11/2022 22  7 - 22 mg/dL Final    Sodium 10/11/2022 139  135 - 145 meq/L Final    Potassium 10/11/2022 5.0  3.5 - 5.2 meq/L Final    Chloride 10/11/2022 99  98 - 111 meq/L Final    CO2 10/11/2022 29  23 - 33 meq/L Final    Calcium 10/11/2022 9.7  8.5 - 10.5 mg/dL Final    AST 10/11/2022 21  5 - 40 U/L Final    Alkaline Phosphatase 10/11/2022 101  38 - 126 U/L Final    Total Protein 10/11/2022 6.4  6.1 - 8.0 g/dL Final    Albumin 10/11/2022 3.9  3.5 - 5.1 g/dL Final    Total Bilirubin 10/11/2022 0.4  0.3 - 1.2 mg/dL Final    ALT 10/11/2022 21  11 - 66 U/L Final    Performed at [address] [place] [address], [address]    WBC 10/11/2022 3.8 (A)  4.8 - 10.8 thou/mm3 Final    RBC 10/11/2022 4.15 (A)  4.20 - 5.40 mill/mm3 Final    Hemoglobin 10/11/2022 12.9  12.0 - 16.0 gm/dl Final    Hematocrit 10/11/2022 38.5  37.0 - 47.0 % Final    MCV 10/11/2022 92.8  81.0 - 99.0 fL Final    MCH 10/11/2022 31.1  26.0 - 33.0 pg Final    MCHC 10/11/2022 33.5  32.2 - 35.5 gm/dl Final    RDW-CV 10/11/2022 12.1  11.5 - 14.5 % Final    RDW-SD 10/11/2022 41.2  35.0 - 45.0 fL Final    Platelets 09/83/3267 205  130 - 400 thou/mm3 Final    MPV 10/11/2022 9.6  9.4 - 12.4 fL Final    Seg Neutrophils 10/11/2022 54.9  % Final    Lymphocytes 10/11/2022 34.3  % Final    Monocytes 10/11/2022 6.9  % Final    Eosinophils 10/11/2022 3.4  % Final    Basophils 10/11/2022 0.5  % Final    Immature Granulocytes 10/11/2022 0.0  % Final    Segs Absolute 10/11/2022 2.1  1.8 - 7.7 thou/mm3 Final    Lymphocytes Absolute 10/11/2022 1.3  1.0 - 4.8 thou/mm3 Final    Monocytes Absolute 10/11/2022 0.3 (A)  0.4 - 1.3 thou/mm3 Final    Eosinophils Absolute 10/11/2022 0.1  0.0 - 0.4 thou/mm3 Final    Basophils Absolute 10/11/2022 0.0  0.0 - 0.1 thou/mm3 Final    Immature Grans (Abs) 10/11/2022 0.00  0.00 - 0.07 thou/mm3 Final    nRBC 10/11/2022 0  /100 wbc Final    Performed at [address] [place] [address], [address]    Hemoglobin A1C 10/11/2022 7.6 (A)  4.4 - 6.4 % Final    AVERAGE GLUCOSE 10/11/2022 168 (A)  70 - 126 mg/dL Final    Performed at [facility], [address]    Cholesterol, Total 10/11/2022 175  100 - 199 mg/dL Final    Comment:      <200          Desirable       200 - 239     Borderline High       >239          High      Triglycerides 10/11/2022 151  0 - 199 mg/dL Final    Comment:      <150          Desirable       150 - 199     Borderline High       200 - 499     High       >449          Very High       Ranges are based upon NCEP/ATP III guidelines. HDL 10/11/2022 42  mg/dL Final    Comment:      Refer to General Chemistry for CHOL and TRIG results. HDL CLASSIFICATIONS FOR PATIENTS > 21YEARS OLD. <40               Undesirable (Major Risk Factor)       >60               Protective (Negative Risk Factor)      LDL Calculated 10/11/2022 103  mg/dL Final    Comment:      Refer to General Chemistry for CHOL and TRIG results.        LDL CLASSIFICATIONS FOR PATIENTS >21YEARS OLD:          ** Determination Invalid if TRIG >400 **       <100          Optimal       100 - 129     Near or Above Optimal       130 - 159     Borderline High       160 - 189     High Risk       >189          Very High Risk  Performed at [address] [place] [address], [address]      TSH 10/11/2022 3.760  0.400 - 4.200 uIU/mL Final    Performed at [address] [place] [address], [address]    Microalbumin, Random Urine 10/11/2022 < 1.20  mg/dL Final    Creatinine, Urine 10/11/2022 90.5  mg/dL Final    Microalb/Creat Ratio 10/11/2022 13  0 - 30 mg/g Final    Performed at [address] [place] [address], [address]    Anion Gap 10/11/2022 11.0  8.0 - 16.0 meq/L Final    Comment: ANION GAP = Sodium -(Chloride + CO2)  Performed at [address] [place] [address], [address]      Est, Glom Filt Rate 10/11/2022 55 (A)  ml/min/1.73m2 Final    Comment: Stage Description                    GFR, ml/min/1.73 m2   -   At increased risk               > or = 60 (with chronic kidney disease risk factors)   1   Normal or increased GFR         > or = 90   2   Mildly or decreased GFR         60 - 89   3   Moderately decreased GFR        30 - 59   4   Severely decreased GFR          15 - 29   5   Kidney failure                  <15 (or dialysis)  Estimated GFR calculated using abbreviated MDRD formula as  recommended by [employer]. Calculation based  upon serum creatinine and adjusted for age, gender & race. Deanna. Internal Med., Vol. 139 (2) pg 137-147. Performed at [facility], [address]          An electronic signature was used to authenticate this note.     --Lorena Aceves MD

## 2022-10-19 NOTE — PATIENT INSTRUCTIONS
Staff -- please repeat blood pressure for this patient before patient leaves the office. If blood pressure not <140/90, then please arrange follow up in 1 month with nurse visit.    Thank you    -- Start drinking more water  -- dark chocolate (try lower sugar type, Ghirardelli or Endangered species)        Copied from [email or web address]se  On 2/4/0341

## 2022-10-25 NOTE — PROGRESS NOTES
NPO after midnight  [employer] and drivers license  Wear comfortable clean clothing  Do not bring jewelry  Shower night before and morning of surgery with a liquid antibacterial soap  Bring list of medications with dosage and how often taken  Follow all instructions given by your physician   needed at discharge  Please limit to 2 visitors for surgery  You must have a responsible adult with you day of surgery and for 24 hours after surgery  Call [phone] for any questions

## 2022-10-25 NOTE — PROGRESS NOTES
In preparation for their surgical procedure above patient was screened for Obstructive Sleep Apnea (AMANUEL) using the STOP-Bang Questionnaire by the Pre-Admission Testing department. This is a pre-surgical screening tool for patient safety and serves as a recommendation, this WILL NOT cause cancellation of surgery. STOP-Bang Questionnaire  * Do you currently see a pulmonologist?  No     If yes STOP, do not complete. Patient follows with DrLorraine     1.  Do you snore loudly (able to be heard in the next room)? No    2. Do you often feel tired or sleepy during the daytime? No       3. Has anyone ever told you that you stop breathing during your sleep? No    4. Do you have or are you being treated for high blood pressure? Yes      5. BMI more than 35? BMI (Calculated): 31.1        No    6. Age over 48 years? 71 y.o. Yes    7. Neck Circumference greater than 17 inches for male or 16 inches for female? Measured           (visits only)            Not Applicable    8. Gender Male? No      TOTAL SCORE: 2    AMANUEL - Low Risk : Yes to 0 - 2 questions  AMANUEL - Intermediate Risk : Yes to 3 - 4 questions  AMANUEL - High Risk : Yes to 5 - 8 questions    Adapted from:   STOP Questionnaire: A Tool to Screen Patients for Obstructive Sleep Apnea   DAYTON Tompkins.C.P.C., Monico Bradshaw, M.B.B.S., Bonifacio Sharp M.D., Neo Sa. Adal Calvillo, Ph.D., SHERRIE Ovalle.B.B.S., Milka Mar M.Sc., Melania Lozano M.D., Clarissa Mayorga. CARINA Saldaña.P.C.    Anesthesiology 2008; 652:114-30 Copyright 2008, the [address] of [employer]est[employer], hector 37.   ----------------------------------------------------------------------------------------------------------------

## 2022-10-25 NOTE — PROGRESS NOTES
EKG 1/17/22 given to anesthesia for review.  Per Dr. Juan J Rivasnty ok to proceed at surgery center 11/1 without further intervention

## 2022-11-01 ENCOUNTER — HOSPITAL ENCOUNTER (OUTPATIENT)
Age: 72
Setting detail: OUTPATIENT SURGERY
Discharge: HOME | End: 2022-11-01
Attending: STUDENT IN AN ORGANIZED HEALTH CARE EDUCATION/TRAINING PROGRAM | Admitting: STUDENT IN AN ORGANIZED HEALTH CARE EDUCATION/TRAINING PROGRAM
Payer: MEDICARE

## 2022-11-01 ENCOUNTER — APPOINTMENT (OUTPATIENT)
Dept: GENERAL RADIOLOGY | Age: 72
End: 2022-11-01
Attending: STUDENT IN AN ORGANIZED HEALTH CARE EDUCATION/TRAINING PROGRAM
Payer: MEDICARE

## 2022-11-01 ENCOUNTER — ANESTHESIA (OUTPATIENT)
Dept: OPERATING ROOM | Age: 72
End: 2022-11-01
Payer: MEDICARE

## 2022-11-01 ENCOUNTER — ANESTHESIA EVENT (OUTPATIENT)
Dept: OPERATING ROOM | Age: 72
End: 2022-11-01
Payer: MEDICARE

## 2022-11-01 VITALS
RESPIRATION RATE: 16 BRPM | HEART RATE: 54 BPM | TEMPERATURE: 96.2 F | DIASTOLIC BLOOD PRESSURE: 60 MMHG | SYSTOLIC BLOOD PRESSURE: 136 MMHG | BODY MASS INDEX: 31.11 KG/M2 | OXYGEN SATURATION: 98 % | HEIGHT: 67 IN | WEIGHT: 198.2 LBS

## 2022-11-01 DIAGNOSIS — G89.18 POST-OP PAIN: Primary | ICD-10-CM

## 2022-11-01 PROCEDURE — 6360000002 HC RX W HCPCS: Performed by: NURSE ANESTHETIST, CERTIFIED REGISTERED

## 2022-11-01 PROCEDURE — 3600000012 HC SURGERY LEVEL 2 ADDTL 15MIN: Performed by: STUDENT IN AN ORGANIZED HEALTH CARE EDUCATION/TRAINING PROGRAM

## 2022-11-01 PROCEDURE — 2500000003 HC RX 250 WO HCPCS: Performed by: STUDENT IN AN ORGANIZED HEALTH CARE EDUCATION/TRAINING PROGRAM

## 2022-11-01 PROCEDURE — 7100000010 HC PHASE II RECOVERY - FIRST 15 MIN: Performed by: STUDENT IN AN ORGANIZED HEALTH CARE EDUCATION/TRAINING PROGRAM

## 2022-11-01 PROCEDURE — 7100000011 HC PHASE II RECOVERY - ADDTL 15 MIN: Performed by: STUDENT IN AN ORGANIZED HEALTH CARE EDUCATION/TRAINING PROGRAM

## 2022-11-01 PROCEDURE — 3700000000 HC ANESTHESIA ATTENDED CARE: Performed by: STUDENT IN AN ORGANIZED HEALTH CARE EDUCATION/TRAINING PROGRAM

## 2022-11-01 PROCEDURE — 2580000003 HC RX 258: Performed by: STUDENT IN AN ORGANIZED HEALTH CARE EDUCATION/TRAINING PROGRAM

## 2022-11-01 PROCEDURE — 3700000001 HC ADD 15 MINUTES (ANESTHESIA): Performed by: STUDENT IN AN ORGANIZED HEALTH CARE EDUCATION/TRAINING PROGRAM

## 2022-11-01 PROCEDURE — 6360000002 HC RX W HCPCS: Performed by: STUDENT IN AN ORGANIZED HEALTH CARE EDUCATION/TRAINING PROGRAM

## 2022-11-01 PROCEDURE — 2709999900 HC NON-CHARGEABLE SUPPLY: Performed by: STUDENT IN AN ORGANIZED HEALTH CARE EDUCATION/TRAINING PROGRAM

## 2022-11-01 PROCEDURE — 3600000002 HC SURGERY LEVEL 2 BASE: Performed by: STUDENT IN AN ORGANIZED HEALTH CARE EDUCATION/TRAINING PROGRAM

## 2022-11-01 PROCEDURE — 73630 X-RAY EXAM OF FOOT: CPT

## 2022-11-01 PROCEDURE — 2500000003 HC RX 250 WO HCPCS: Performed by: NURSE ANESTHETIST, CERTIFIED REGISTERED

## 2022-11-01 RX ORDER — OXYCODONE HYDROCHLORIDE AND ACETAMINOPHEN 5; 325 MG/1; MG/1
1 TABLET ORAL EVERY 6 HOURS PRN
Qty: 20 TABLET | Refills: 0 | Status: SHIPPED | OUTPATIENT
Start: 2022-11-01 | End: 2022-11-06

## 2022-11-01 RX ORDER — LIDOCAINE HYDROCHLORIDE 10 MG/ML
INJECTION, SOLUTION EPIDURAL; INFILTRATION; INTRACAUDAL; PERINEURAL PRN
Status: DISCONTINUED | OUTPATIENT
Start: 2022-11-01 | End: 2022-11-01 | Stop reason: SDUPTHER

## 2022-11-01 RX ORDER — SODIUM CHLORIDE 0.9 % (FLUSH) 0.9 %
5-40 SYRINGE (ML) INJECTION PRN
Status: CANCELLED | OUTPATIENT
Start: 2022-11-01

## 2022-11-01 RX ORDER — LIDOCAINE HYDROCHLORIDE 20 MG/ML
INJECTION, SOLUTION EPIDURAL; INFILTRATION; INTRACAUDAL; PERINEURAL PRN
Status: DISCONTINUED | OUTPATIENT
Start: 2022-11-01 | End: 2022-11-01 | Stop reason: ALTCHOICE

## 2022-11-01 RX ORDER — FENTANYL CITRATE 50 UG/ML
INJECTION, SOLUTION INTRAMUSCULAR; INTRAVENOUS PRN
Status: DISCONTINUED | OUTPATIENT
Start: 2022-11-01 | End: 2022-11-01 | Stop reason: SDUPTHER

## 2022-11-01 RX ORDER — SODIUM CHLORIDE 9 MG/ML
INJECTION, SOLUTION INTRAVENOUS PRN
Status: DISCONTINUED | OUTPATIENT
Start: 2022-11-01 | End: 2022-11-01 | Stop reason: HOSPADM

## 2022-11-01 RX ORDER — SODIUM CHLORIDE 0.9 % (FLUSH) 0.9 %
5-40 SYRINGE (ML) INJECTION PRN
Status: DISCONTINUED | OUTPATIENT
Start: 2022-11-01 | End: 2022-11-01 | Stop reason: HOSPADM

## 2022-11-01 RX ORDER — OXYCODONE HYDROCHLORIDE 5 MG/1
5 TABLET ORAL EVERY 4 HOURS PRN
Status: CANCELLED | OUTPATIENT
Start: 2022-11-01

## 2022-11-01 RX ORDER — ONDANSETRON 2 MG/ML
INJECTION INTRAMUSCULAR; INTRAVENOUS PRN
Status: DISCONTINUED | OUTPATIENT
Start: 2022-11-01 | End: 2022-11-01 | Stop reason: SDUPTHER

## 2022-11-01 RX ORDER — SODIUM CHLORIDE 0.9 % (FLUSH) 0.9 %
5-40 SYRINGE (ML) INJECTION EVERY 12 HOURS SCHEDULED
Status: DISCONTINUED | OUTPATIENT
Start: 2022-11-01 | End: 2022-11-01 | Stop reason: HOSPADM

## 2022-11-01 RX ORDER — SODIUM CHLORIDE 9 MG/ML
INJECTION, SOLUTION INTRAVENOUS PRN
Status: CANCELLED | OUTPATIENT
Start: 2022-11-01

## 2022-11-01 RX ORDER — SODIUM CHLORIDE 9 MG/ML
INJECTION, SOLUTION INTRAVENOUS CONTINUOUS
Status: CANCELLED | OUTPATIENT
Start: 2022-11-01

## 2022-11-01 RX ORDER — PROPOFOL 10 MG/ML
INJECTION, EMULSION INTRAVENOUS PRN
Status: DISCONTINUED | OUTPATIENT
Start: 2022-11-01 | End: 2022-11-01 | Stop reason: SDUPTHER

## 2022-11-01 RX ORDER — SODIUM CHLORIDE 0.9 % (FLUSH) 0.9 %
5-40 SYRINGE (ML) INJECTION EVERY 12 HOURS SCHEDULED
Status: CANCELLED | OUTPATIENT
Start: 2022-11-01

## 2022-11-01 RX ADMIN — SODIUM CHLORIDE: 9 INJECTION, SOLUTION INTRAVENOUS at 08:29

## 2022-11-01 RX ADMIN — LIDOCAINE HYDROCHLORIDE 50 MG: 10 INJECTION, SOLUTION EPIDURAL; INFILTRATION; INTRACAUDAL; PERINEURAL at 08:31

## 2022-11-01 RX ADMIN — PROPOFOL 50 MG: 10 INJECTION, EMULSION INTRAVENOUS at 08:31

## 2022-11-01 RX ADMIN — FENTANYL CITRATE 50 MCG: 50 INJECTION, SOLUTION INTRAMUSCULAR; INTRAVENOUS at 08:31

## 2022-11-01 RX ADMIN — ONDANSETRON 4 MG: 2 INJECTION INTRAMUSCULAR; INTRAVENOUS at 08:33

## 2022-11-01 RX ADMIN — CEFAZOLIN 2000 MG: 10 INJECTION, POWDER, FOR SOLUTION INTRAVENOUS at 08:35

## 2022-11-01 RX ADMIN — PROPOFOL 100 MCG/KG/MIN: 10 INJECTION, EMULSION INTRAVENOUS at 08:32

## 2022-11-01 ASSESSMENT — PAIN - FUNCTIONAL ASSESSMENT: PAIN_FUNCTIONAL_ASSESSMENT: 0-10

## 2022-11-01 ASSESSMENT — PAIN SCALES - GENERAL: PAINLEVEL_OUTOF10: 0

## 2022-11-01 NOTE — PROGRESS NOTES
0901 RECEIVED IN PHASE 2. AWAKE ON ARRIVAL. AIRWAY PATENT. O2 SAT 98% DENIES PAIN .LT. FOOT IN POST OP SHOE ELEVATED IN RECLINER CHAIR  DRESSING DRY AND INTACT. SISTER AT BEDSIDE.  0905 COFFEE AND MUFFIN GIVEN.  3926 X-RAY OBTAINED AS ORDERED.  2474 DISCHARGE INSTRUCTIONS GIVEN TO PATIENT AND SISTER VERBALIZE UNDERSTANDING.  2162 GETTING DRESSED  1128 DISCHARGE VIA WHEELCHAIR TO PRIVATE CAR WITHOUT COMPLAINT.

## 2022-11-01 NOTE — DISCHARGE INSTRUCTIONS
Post-Operative Instructions    Diet: Drink liquids first; if tolerated add soft foods and increase diet as tolerated  Activity: Adults do NOT drive today. Due to anesthesia and medication your reflexes are slower. If you are wearing a surgical shoe or walking boot you may not drive until Dr. Ilsa Villeda releases you to do so. Ambulate: Weightbearing as tolerated in your post-op shoe left foot  Leave the bandage in place. Do not remove it. Keep it dry and clean. Rest by staying off your feet as much as possible. (Rent movies, read a book, etc.)  Wear surgical shoe/boot at all times when you are up on your foot/feet. Elevate the involved foot. Proper elevation is when the involved foot/feet are at the same level as the heart or slightly higher. If the foot has some throbbing you may apply ice to the top of the ankle area or behind the knee, 20 min on 20 min off. Take medications as prescribed   If there should be any excess bleeding to the bandage (wet blood larger than the size of a quarter), uncontrolled pain, questions or concerns please contact:   Office between 8:00 a.m. - 4:00P. M.    [phone]              Please return to office for a post-op dressing change and elevation of healing. Post-operative appointment is at previously scheduled date.      Dr. Ilsa Villeda DPM

## 2022-11-01 NOTE — ANESTHESIA PRE PROCEDURE
Department of Anesthesiology  Preprocedure Note       Name:  Lenora Carlos   Age:  71 y.o.  :  1950                                          MRN:  [id number]         Date:  2022      Surgeon: Sunita Deleon):  Joi Kent DPM    Procedure: Procedure(s):  Left 5th Hammertoe Correction with Norma Hefty Rotational Arthroplasty    Medications prior to admission:   Prior to Admission medications    Medication Sig Start Date End Date Taking? Authorizing Provider   atenolol (TENORMIN) 50 MG tablet Take 1 tablet by mouth daily 10/19/22 4/16/23  Elina Araujo MD   lisinopril-hydroCHLOROthiazide (PRINZIDE;ZESTORETIC) 20-12.5 MG per tablet Take 1 tablet by mouth 2 times daily 10/19/22   Elina Araujo MD   simvastatin (ZOCOR) 10 MG tablet Take 1 tablet by mouth nightly 10/19/22   Elina Araujo MD   traZODone (DESYREL) 50 MG tablet Take 1 tablet by mouth nightly as needed for Sleep 10/19/22   Elina Araujo MD   traMADol (ULTRAM) 50 MG tablet Take 1 tablet by mouth every 6 hours as needed for Pain for up to 90 days. 10/19/22 1/17/23  Elina Araujo MD   celecoxib (CELEBREX) 200 MG capsule Take 1 capsule by mouth 2 times daily 10/19/22 4/16/23  Elina Araujo MD   anastrozole (ARIMIDEX) 1 MG tablet Take 1 tablet by mouth daily 22   Carmen Ordonez PA-C   conjugated estrogens (PREMARIN) 0.625 MG/GM vaginal cream Place vaginally daily. As needed. 4/6/15 4/4/16  Max Florez MD   therapeutic multivitamin-minerals [facility]) tablet Take 1 tablet by mouth daily.     Historical Provider, MD       Current medications:    Current Facility-Administered Medications   Medication Dose Route Frequency Provider Last Rate Last Admin    sodium chloride flush 0.9 % injection 5-40 mL  5-40 mL IntraVENous 2 times per day Darren [place]kill, DPM        sodium chloride flush 0.9 % injection 5-40 mL  5-40 mL IntraVENous PRN Darren Janett, DPM        0.9 % sodium chloride infusion   IntraVENous PRN Jenniferndrikki Maria Sevcik, DPM        ceFAZolin (ANCEF) 2000 mg in dextrose 5 % 50 mL IVPB  2,000 mg IntraVENous On Call to [facility], [facility]           Allergies:  No Known Allergies    Problem List:    Patient Active Problem List   Diagnosis Code    SVT (supraventricular tachycardia) (HCC) I47.1    Insomnia G47.00    Atrophic vaginitis N95.2    Type 2 diabetes mellitus without complication, without long-term current use of insulin (HCC) E11.9    Arthritis M19.90    Essential hypertension I10    Pre-ulcerative calluses L84    Pure hypercholesterolemia E78.00    Malignant neoplasm of lower-outer quadrant of breast of female, estrogen receptor positive ([facility] [address] C50.519, Z17.0       Past Medical History:        Diagnosis Date    Arthritis     Borderline hyperlipidemia     Breast cancer ([facility] [address] 2022    right    Diabetic ulcer of toe of left foot associated with type 2 diabetes mellitus, limited to breakdown of skin ([facility] [address] 08/31/2020    Hypertension     Invasive ductal carcinoma of breast ([facility] [address] 01/2022    right breast    SVT (supraventricular tachycardia) (HCC)     Type II or unspecified type diabetes mellitus without mention of complication, not stated as uncontrolled     prediabetes-no meds       Past Surgical History:        Procedure Laterality Date    BREAST LUMPECTOMY Right 01/19/2022    RIGHT LUMPECTOMY WITH PREOP OPERATIVE NEEDLE LOCALIZATION performed by Kristin Barclay MD at [facility] Bilateral    [facility] Bilateral     [facility] US GUID NDL BIOPSY RIGHT Right 01/10/2022    [facility] [address] RIGHT 1/10/2022 [facility] Left     [place]    US GUIDED NEEDLE LOC OF RIGHT BREAST Right 01/19/2022    US GUIDED NEEDLE LOC OF RIGHT BREAST 1/19/2022 Delma Carr MD [facility]       Social History:    Social History     Tobacco Use    Smoking status: Never    Smokeless tobacco: Never   Substance Use Topics    Alcohol use: Not Currently Counseling given: Not Answered      Vital Signs (Current):   Vitals:    10/25/22 1323 11/01/22 0735   BP:  (!) 148/75   Pulse:  79   Resp:  16   Temp:  97.6 °F (36.4 °C)   TempSrc:  Temporal   SpO2:  99%   Weight: 198 lb (89.8 kg) 198 lb 3.2 oz (89.9 kg)   Height: 5' 7\" (1.702 m) 5' 7\" (1.702 m)                                              BP Readings from Last 3 Encounters:   11/01/22 (!) 148/75   10/19/22 (!) 144/86   07/22/22 122/76       NPO Status: Time of last liquid consumption: 0600 (sip of water with pills)                        Time of last solid consumption: 2200                        Date of last liquid consumption: 11/01/22                        Date of last solid food consumption: 10/31/22    BMI:   Wt Readings from Last 3 Encounters:   11/01/22 198 lb 3.2 oz (89.9 kg)   10/19/22 198 lb 12.8 oz (90.2 kg)   07/22/22 197 lb 11.2 oz (89.7 kg)     Body mass index is 31.04 kg/m². CBC:   Lab Results   Component Value Date/Time    WBC 3.8 10/11/2022 08:15 AM    RBC 4.15 10/11/2022 08:15 AM    HGB 12.9 10/11/2022 08:15 AM    HCT 38.5 10/11/2022 08:15 AM    MCV 92.8 10/11/2022 08:15 AM    RDW 13.4 02/01/2018 09:04 AM     10/11/2022 08:15 AM       CMP:   Lab Results   Component Value Date/Time     10/11/2022 08:15 AM    K 5.0 10/11/2022 08:15 AM    CL 99 10/11/2022 08:15 AM    CO2 29 10/11/2022 08:15 AM    BUN 22 10/11/2022 08:15 AM    CREATININE 1.0 10/11/2022 08:15 AM    LABGLOM 55 10/11/2022 08:15 AM    GLUCOSE 183 10/11/2022 08:15 AM    PROT 6.4 10/11/2022 08:15 AM    CALCIUM 9.7 10/11/2022 08:15 AM    BILITOT 0.4 10/11/2022 08:15 AM    ALKPHOS 101 10/11/2022 08:15 AM    AST 21 10/11/2022 08:15 AM    ALT 21 10/11/2022 08:15 AM       POC Tests: No results for input(s): POCGLU, POCNA, POCK, POCCL, POCBUN, POCHEMO, POCHCT in the last 72 hours.     Coags: No results found for: PROTIME, INR, APTT    HCG (If Applicable): No results found for: PREGTESTUR, PREGSERUM, HCG, HCGQUANT     ABGs: No results found for: PHART, PO2ART, FQO9BFQ, PHF3QCY, BEART, G8FXUDBS     Type & Screen (If Applicable):  Lab Results   Component Value Date    LABRH POS 04/08/2013       Drug/Infectious Status (If Applicable):  No results found for: HIV, HEPCAB    COVID-19 Screening (If Applicable): No results found for: COVID19        Anesthesia Evaluation  Patient summary reviewed and Nursing notes reviewed  Airway: Mallampati: II          Dental:          Pulmonary: breath sounds clear to auscultation                             Cardiovascular:  Exercise tolerance: good (>4 METS),   (+) hypertension:,         Rhythm: regular  Rate: normal                    Neuro/Psych:               GI/Hepatic/Renal:             Endo/Other:    (+) Diabeteswell controlled, , .                 Abdominal:             Vascular: Other Findings:           Anesthesia Plan      MAC     ASA 2       Induction: intravenous. Anesthetic plan and risks discussed with patient. Plan discussed with CRNA.                     Bong Sam DO   11/1/2022

## 2022-11-01 NOTE — ANESTHESIA POSTPROCEDURE EVALUATION
Department of Anesthesiology  Postprocedure Note    Patient: Afshan Schulte  MRN: [id number]  YOB: 1950  Date of evaluation: 11/1/2022      Procedure Summary     Date: 11/01/22 Room / Location: [address] [place] [address] 02 / [address] [facility]    Anesthesia Start: 1074 Anesthesia Stop: 1228    Procedure: Left 5th Hammertoe Correction with Cherie Dane Rotational Arthroplasty (Left) Diagnosis:       Pain in left toe(s)      Hammer toe of left foot      (Pain in left toe(s) [M79.675])      (Hammer toe of left foot [M20.42])    Surgeons: Cyndi Alves DPM Responsible Provider: Aimee Du DO    Anesthesia Type: MAC ASA Status: 2          Anesthesia Type: No value filed.     Isidro Phase I:      Isidro Phase II: Isidro Score: 10      Anesthesia Post Evaluation    Patient location during evaluation: bedside  Patient participation: complete - patient participated  Level of consciousness: awake  Airway patency: patent  Nausea & Vomiting: no nausea  Complications: no  Cardiovascular status: hemodynamically stable  Respiratory status: acceptable  Hydration status: stable

## 2022-11-01 NOTE — BRIEF OP NOTE
Brief Postoperative Note      Patient: Kyle Marie  YOB: 1950  MRN: [id number]    Date of Procedure: 11/1/2022    Pre-Op Diagnosis: Pain in left toe(s) [M79.675]  Hammer toe of left foot [M20.42]    Post-Op Diagnosis: Same       Procedure(s):  Left 5th Hammertoe Correction with Junior You Rotational Arthroplasty    Surgeon(s):  Jill Cook DPM    Assistant:  Resident: Shavon Sultana DPM    Anesthesia: Monitor Anesthesia Care    Estimated Blood Loss (mL): Minimal    Complications: None    Specimens:   * No specimens in log *    Implants:  * No implants in log *      Drains: * No LDAs found *    Suture: 3-0 vicryl, 4-0 prolene    Injectables: 10cc 2% lidocaine plain    Findings: Consistent with diagnosis    Electronically signed by Shavon Sultana DPM on 11/1/2022 at 9:01 AM

## 2022-11-01 NOTE — OP NOTE
Operative Note      Patient: Ernie Mccoy  YOB: 1950  MRN: [id number]    Date of Procedure: 11/1/2022    Pre-Op Diagnosis: Pain in left toe(s) [M79.675]  Hammer toe of left foot [M20.42]    Post-Op Diagnosis: Same       Procedure(s):  Left 5th Hammertoe Correction with Howie Righter Rotational Arthroplasty    Surgeon(s):  Elo Haider DPM    Assistant:   Resident: Pily Guerrero DPM    Anesthesia: Monitor Anesthesia Care    Estimated Blood Loss (mL): Minimal    Complications: None    Specimens:   * No specimens in log *    Implants:  * No implants in log *      Drains: * No LDAs found *    Findings: consistent with diagnosis     Detailed Description of Procedure: Indications: Patient is a 71 y.o. female with a chief complaint of left toe pain being treated for left 5th hammertoe. At this time all conservative options have been exhausted and surgical intervention is necessary. The procedure has been explained to the patient and they understand the risks, benefits and possible complications including bleeding, infection, dehiscence, recurrence, flail toe, CRPS, nerve damage, DVT, PE, loss of limb, loss of life. No promises have been made as to surgical outcome. Procedure: The patient was transported from the pre-op holding to the operating room and placed in a supine position. A pre-operative injection of 10cc of 2% lidocaine was injected into the left foot in a digital block. The left foot was then prepped and draped in the normal aspetic manner. Attention was then directed to the dorsal aspect of the left 5th digit. A semi-elliptical incision was made in a proximal lateral to distal medial orientation with a 15 scalpel blade. The wedge removed was approximately 3mm in width. Skin edges were carefully retracted, all small bleeding vessels were cauterized as needed. Blunt dissection was then carried down to the level of the extensor cisneros apparatus.   A scalpel blade was then used to make a transverse tenotomy cut on the most dorsal aspect of the proximal interphalangeal joint. The medial and lateral collateral ligaments were then released and the extensor tendon was dissected proximally. Following the transverse tenotomy, exposure to the dorsal extensor cisneros apparatus was performed over the metatarsophalangeal joint itself. A TPS sagittal saw was then used to remove the proximal phalanx head, approximately 3 mm in length, removing all articular cartilage. A rongeur was then used to remove the medial, dorsal and lateral bony prominences following the osteotomy cut. The site was then flushed with copious amounts of normal sterile saline. After repair of the extensor tendon with 3-0 vicryl the digit was rectus with no lateral eminence. No adduction or varus rotation noted. The mini c arm was not working within the case. Post op imaging was obtained in PACU that demonstrated a rectus 5th digit. The incision was flushed with copious amounts of sterile saline. The skin was closed using 4-0 prolene. The incision was dressed with adaptic, 4x4's, kerlix, etc.   A surgical shoe was then applied. The patient was transported to the PACU with VSS and NVS intact to all digits of the left foot. No complications were noted throughout the procedure. The patient is to be discharged per PACU protocol.      Dr. Yemi Hinkle DPM      Electronically signed by Lili Khan DPM on 11/1/2022 at 9:04 AM

## 2022-11-01 NOTE — H&P
[address] [address]  History and Physical Update    Pt Name: Aicha Hernandez  MRN: [id number]  YOB: 1950  Date of evaluation: 11/1/2022    I have examined the patient and reviewed the H&P/Consult and there are no changes to the patient or plans.       Jaydon Mays DPM,  Electronically signed 11/1/2022 at 8:20 AM

## 2022-11-22 ENCOUNTER — HOSPITAL ENCOUNTER (OUTPATIENT)
Dept: INFUSION THERAPY | Age: 72
Discharge: HOME | End: 2022-11-22
Payer: MEDICARE

## 2022-11-22 ENCOUNTER — OFFICE VISIT (OUTPATIENT)
Dept: ONCOLOGY | Age: 72
End: 2022-11-22
Payer: MEDICARE

## 2022-11-22 VITALS
BODY MASS INDEX: 31.39 KG/M2 | HEART RATE: 65 BPM | OXYGEN SATURATION: 98 % | WEIGHT: 200 LBS | RESPIRATION RATE: 16 BRPM | HEIGHT: 67 IN | TEMPERATURE: 98.4 F | DIASTOLIC BLOOD PRESSURE: 72 MMHG | SYSTOLIC BLOOD PRESSURE: 118 MMHG

## 2022-11-22 VITALS
TEMPERATURE: 98.4 F | SYSTOLIC BLOOD PRESSURE: 118 MMHG | HEART RATE: 65 BPM | RESPIRATION RATE: 16 BRPM | DIASTOLIC BLOOD PRESSURE: 72 MMHG

## 2022-11-22 DIAGNOSIS — C50.511 MALIGNANT NEOPLASM OF LOWER-OUTER QUADRANT OF RIGHT BREAST OF FEMALE, ESTROGEN RECEPTOR POSITIVE (HCC): Primary | ICD-10-CM

## 2022-11-22 DIAGNOSIS — Z08 ENCOUNTER FOR FOLLOW-UP SURVEILLANCE OF BREAST CANCER: ICD-10-CM

## 2022-11-22 DIAGNOSIS — Z85.3 ENCOUNTER FOR FOLLOW-UP SURVEILLANCE OF BREAST CANCER: ICD-10-CM

## 2022-11-22 DIAGNOSIS — Z79.811 PROPHYLACTIC USE OF ANASTROZOLE (ARIMIDEX): ICD-10-CM

## 2022-11-22 DIAGNOSIS — Z98.890 STATUS POST RIGHT BREAST LUMPECTOMY: ICD-10-CM

## 2022-11-22 DIAGNOSIS — Z17.0 MALIGNANT NEOPLASM OF LOWER-OUTER QUADRANT OF RIGHT BREAST OF FEMALE, ESTROGEN RECEPTOR POSITIVE (HCC): Primary | ICD-10-CM

## 2022-11-22 PROCEDURE — G8484 FLU IMMUNIZE NO ADMIN: HCPCS | Performed by: PHYSICIAN ASSISTANT

## 2022-11-22 PROCEDURE — 1090F PRES/ABSN URINE INCON ASSESS: CPT | Performed by: PHYSICIAN ASSISTANT

## 2022-11-22 PROCEDURE — 3078F DIAST BP <80 MM HG: CPT | Performed by: PHYSICIAN ASSISTANT

## 2022-11-22 PROCEDURE — G9899 SCRN MAM PERF RSLTS DOC: HCPCS | Performed by: PHYSICIAN ASSISTANT

## 2022-11-22 PROCEDURE — 3074F SYST BP LT 130 MM HG: CPT | Performed by: PHYSICIAN ASSISTANT

## 2022-11-22 PROCEDURE — G8417 CALC BMI ABV UP PARAM F/U: HCPCS | Performed by: PHYSICIAN ASSISTANT

## 2022-11-22 PROCEDURE — 99214 OFFICE O/P EST MOD 30 MIN: CPT | Performed by: PHYSICIAN ASSISTANT

## 2022-11-22 PROCEDURE — G8399 PT W/DXA RESULTS DOCUMENT: HCPCS | Performed by: PHYSICIAN ASSISTANT

## 2022-11-22 PROCEDURE — 3017F COLORECTAL CA SCREEN DOC REV: CPT | Performed by: PHYSICIAN ASSISTANT

## 2022-11-22 PROCEDURE — 1123F ACP DISCUSS/DSCN MKR DOCD: CPT | Performed by: PHYSICIAN ASSISTANT

## 2022-11-22 PROCEDURE — 99211 OFF/OP EST MAY X REQ PHY/QHP: CPT

## 2022-11-22 PROCEDURE — 1036F TOBACCO NON-USER: CPT | Performed by: PHYSICIAN ASSISTANT

## 2022-11-22 PROCEDURE — G8427 DOCREV CUR MEDS BY ELIG CLIN: HCPCS | Performed by: PHYSICIAN ASSISTANT

## 2022-11-22 RX ORDER — ANASTROZOLE 1 MG/1
1 TABLET ORAL DAILY
Qty: 90 TABLET | Refills: 1 | Status: SHIPPED | OUTPATIENT
Start: 2022-11-22

## 2022-11-22 NOTE — PATIENT INSTRUCTIONS
Return to clinic with Dr. Luis Daniel Manzano in 6 months  DEXA in January 2023  Mammogram previously ordered per Dr. Gomez Branch, not scheduled. Due in January 2023  Please call for questions or concerns.

## 2022-11-22 NOTE — PROGRESS NOTES
[facility] [address] [facility], [address] [facility]  [address] [address]  Dept: [phone]  Dept Fax: [phone]: [phone]      Visit Date:11/22/2022     Vielka Burt is a 71 y.o. female who presents today for:   Follow up       HPI:   Vielka Burt is a 71 y.o. female previously followed by Dr. Liv Larsen for history of right breast cancer. Per Dr. Mimi Vela note on 4/15/22:   Patient had screening mammogram on January 5, 2022 that showed 5 mm oval density in the central right breast, middle depth. Spot compression views, and LM view and an ultrasound were performed and confirmed the presence of spiculated 4 x 3 x 5 mm hypoechoic mass. Subsequently on January 10, 2022 the patient underwent mass biopsy that revealed well differentiated invasive ductal carcinoma, Friendship score 1 of 3. Estrogen Receptor: Positive 95% of cells, Progesterone Receptor: Positive 100% of cells, Ki-67 Percentage of positive nuclei:  15%    HER2 MALAIKA NEGATIVE   She met with the surgeon Dr. Kirsten Cowden and after discussing her surgical options she decided to proceed with right breast lumpectomy. Due to her age of 80 is a sentinel lymph node biopsy was omitted, since clinically she had a negative axillary lymph nodes. She had surgery on January 19, 2022, final pathology report showed:  A. Right breast mass, excision:    Invasive ductal carcinoma, Friendship grade 1. Margins free of carcinoma. Pathologic stage: pT1a. B. Breast, additional caudal margins, excision:    Negative for malignancy. Fibrocystic changes. Incidental intraductal papilloma, excised. Risk assessment:   Onset of menses at at 14  Onset of menopause at [address] child at age of 26  No hormonal therapy      Her postsurgical course was unremarkable  The patient decided to omit her radiation treatment. She was placed on adjuvant hormonal therapy with Arimidex in February 2022.     Interval History 11/22/2022: The patient is here for follow up evaluation. Since her Survivorship visit in June 2022, she reports she has been feeling well. She offers no complaints related to her breasts. She denies nipple inversion, nipple discharge or palpable masses. She affirms red spot in her right axilla which occurred approximately 1 month ago. Denies scaling or itching. She denies unintentional weight loss, poor appetite, early satiety, or new bone pain. She tolerates Arimidex well. She states hot flashes improved with switching Arimidex to nightly. She affirms difficulty losing weight. Discussed diet modification and nutrition information today. PMH, SH, and FH:  I reviewed the patients medication list and allergy list as noted on the electronic medical record. The PMH, SH and FH were also reviewed as noted on the EMR. Review of Systems:   Review of Systems   Pertinent review of systems noted in HPI, all other ROS negative. Objective:   Physical Exam   /72 (Site: Left Upper Arm, Position: Sitting, Cuff Size: Medium Adult)   Pulse 65   Temp 98.4 °F (36.9 °C) (Oral)   Resp 16   Ht 5' 7\" (1.702 m)   Wt 200 lb (90.7 kg)   SpO2 98%   BMI 31.32 kg/m²    General appearance: No apparent distress, well developed and cooperative. HEENT: Pupils equal, round, and reactive to light. Conjunctivae/corneas clear. Neck: Supple, with full range of motion. Trachea midline. Respiratory:  Normal respiratory effort. Clear to auscultation, bilaterally without Rales/Wheezes/Rhonchi. Cardiovascular: Regular rate and rhythm with normal S1/S2   Chest: S/P right lumpectomy. No nipple inversion or discharge. There is small erythematous area in the axilla consistent with skin irritation/rubbing. No plaquing, bleeding noted. Abdomen: Soft, active bowel sounds. Musculoskeletal: No clubbing, cyanosis or edema bilaterally. Skin: Skin color, texture, turgor normal.  No visible rashes or lesions.   Neurologic: Neurovascularly intact without any focal sensory/motor deficits. Psychiatric: Alert and oriented       Imaging Studies and Labs:   CBC:   Lab Results   Component Value Date    WBC 3.8 (L) 10/11/2022    HGB 12.9 10/11/2022    HCT 38.5 10/11/2022    MCV 92.8 10/11/2022     10/11/2022     BMP:   Lab Results   Component Value Date/Time     10/11/2022 08:15 AM    K 5.0 10/11/2022 08:15 AM    CL 99 10/11/2022 08:15 AM    CO2 29 10/11/2022 08:15 AM    BUN 22 10/11/2022 08:15 AM    CREATININE 1.0 10/11/2022 08:15 AM    GLUCOSE 183 10/11/2022 08:15 AM    CALCIUM 9.7 10/11/2022 08:15 AM      LFT:   Lab Results   Component Value Date    ALT 21 10/11/2022    AST 21 10/11/2022    ALKPHOS 101 10/11/2022    BILITOT 0.4 10/11/2022         Assessment and Plan: 1. Stage IA (pT1a, pN0, cM0, G1, ER+, OK+, HER2-)  S/p right lumpectomy with axillary LN biopsy. The patient was informed that she has an early stage of breast cancer that does not require systemic chemotherapy. Since her tumor was smaller than 5 mm in size there is no need to assess it by the Breast OncotypeDx assay. Next, we discuss the role of radiation therapy. The CALGB 9343 study enrolled patients 79years of age and older. The results showed 10% risk of local disease recurrence at 10 years in women treated with tamoxifen only ( no radiation therapy) in contrast to 2% risk of local disease recurrence at 10 years in women treated with tamoxifen and radiation therapy. However overall survival was the same for both groups. PRIME 2 study, confirmed a modest reduction in recurrence rate with RT that did not translate into a survival benefit. After a median follow-up of five years, ipsilateral breast tumor recurrences were lower in women assigned to RT (1.3 versus 4.1 percent). No differences in overall survival, regional recurrence, distant metastases, contralateral breast cancers, or new breast cancers were noted between the two groups.  The patient did to

## 2022-11-28 DIAGNOSIS — M19.90 ARTHRITIS: Chronic | ICD-10-CM

## 2022-11-28 NOTE — TELEPHONE ENCOUNTER
At last visit, she was given Tramadol 60/5 for addressing chronic pain issues. The last tramadol per PDMP is 10/20. There should be 5 more refills on file with pharmacy. Please check with pharmacy.

## 2022-11-28 NOTE — TELEPHONE ENCOUNTER
Called patient to find out why she was taking Tramadol, and patient reports had been taking Tramadol for arthritis pain for a long time. Tramadol was D/C'd when leaving the facility d/t toe surgery and was taking Percocet that is completed. Patient will need a new script for Tramadol.

## 2022-11-28 NOTE — TELEPHONE ENCOUNTER
Patient called in stating that she needs a refill of her TRAMADOL 50 MG sent into the [facility] service pharmacy.

## 2022-11-28 NOTE — TELEPHONE ENCOUNTER
Tramadol 50 mg was discontinued by facility at discharge. Is this something you would want to order.

## 2022-11-28 NOTE — TELEPHONE ENCOUNTER
Spoke with [facility] and then [facility] and was told that the pt's tramadol rx was last shipped 9/1/22. They have no active rx for tramadol. Spoke with pt and states she last received her Tramadol rx in October from [address] [place] [address]? Pt is ok with the Tramadol rx to be sent ton [facility]. Rx pending to [facility].

## 2022-11-29 RX ORDER — TRAMADOL HYDROCHLORIDE 50 MG/1
50 TABLET ORAL EVERY 6 HOURS PRN
Qty: 60 TABLET | Refills: 4 | Status: SHIPPED | OUTPATIENT
Start: 2022-11-29 | End: 2023-02-27

## 2023-01-06 ENCOUNTER — HOSPITAL ENCOUNTER (OUTPATIENT)
Dept: WOMENS IMAGING | Age: 73
Discharge: HOME | End: 2023-01-06
Payer: MEDICARE

## 2023-01-06 DIAGNOSIS — Z12.31 SCREENING MAMMOGRAM FOR BREAST CANCER: ICD-10-CM

## 2023-01-06 PROCEDURE — 77067 SCR MAMMO BI INCL CAD: CPT

## 2023-01-10 ENCOUNTER — HOSPITAL ENCOUNTER (OUTPATIENT)
Age: 73
Discharge: HOME | End: 2023-01-10
Payer: MEDICARE

## 2023-01-10 DIAGNOSIS — E11.9 TYPE 2 DIABETES MELLITUS WITHOUT COMPLICATION, WITHOUT LONG-TERM CURRENT USE OF INSULIN (HCC): ICD-10-CM

## 2023-01-10 DIAGNOSIS — E78.00 PURE HYPERCHOLESTEROLEMIA: Chronic | ICD-10-CM

## 2023-01-10 DIAGNOSIS — F51.01 PRIMARY INSOMNIA: ICD-10-CM

## 2023-01-10 DIAGNOSIS — I47.1 SVT (SUPRAVENTRICULAR TACHYCARDIA) (HCC): Chronic | ICD-10-CM

## 2023-01-10 DIAGNOSIS — M19.90 ARTHRITIS: Chronic | ICD-10-CM

## 2023-01-10 DIAGNOSIS — I10 ESSENTIAL HYPERTENSION: ICD-10-CM

## 2023-01-10 LAB
AVERAGE GLUCOSE: 180 MG/DL (ref 70–126)
HBA1C MFR BLD: 8 % (ref 4.4–6.4)

## 2023-01-10 PROCEDURE — 83036 HEMOGLOBIN GLYCOSYLATED A1C: CPT

## 2023-01-10 PROCEDURE — 36415 COLL VENOUS BLD VENIPUNCTURE: CPT

## 2023-01-12 DIAGNOSIS — F51.01 PRIMARY INSOMNIA: ICD-10-CM

## 2023-01-12 DIAGNOSIS — E11.9 TYPE 2 DIABETES MELLITUS WITHOUT COMPLICATION, WITHOUT LONG-TERM CURRENT USE OF INSULIN (HCC): ICD-10-CM

## 2023-01-12 DIAGNOSIS — I10 ESSENTIAL HYPERTENSION: ICD-10-CM

## 2023-01-12 DIAGNOSIS — M19.90 ARTHRITIS: Chronic | ICD-10-CM

## 2023-01-12 DIAGNOSIS — E78.00 PURE HYPERCHOLESTEROLEMIA: Chronic | ICD-10-CM

## 2023-01-12 DIAGNOSIS — I47.1 SVT (SUPRAVENTRICULAR TACHYCARDIA) (HCC): Chronic | ICD-10-CM

## 2023-01-12 RX ORDER — ATENOLOL 50 MG/1
50 TABLET ORAL DAILY
Qty: 90 TABLET | Refills: 3 | Status: SHIPPED | OUTPATIENT
Start: 2023-01-12 | End: 2023-07-10

## 2023-01-12 RX ORDER — CELECOXIB 200 MG/1
200 CAPSULE ORAL 2 TIMES DAILY
Qty: 180 CAPSULE | Refills: 3 | Status: SHIPPED | OUTPATIENT
Start: 2023-01-12 | End: 2023-07-10

## 2023-01-12 RX ORDER — SIMVASTATIN 10 MG
10 TABLET ORAL NIGHTLY
Qty: 90 TABLET | Refills: 3 | Status: SHIPPED | OUTPATIENT
Start: 2023-01-12

## 2023-01-12 RX ORDER — TRAZODONE HYDROCHLORIDE 50 MG/1
50 TABLET ORAL NIGHTLY PRN
Qty: 90 TABLET | Refills: 3 | Status: SHIPPED | OUTPATIENT
Start: 2023-01-12

## 2023-01-12 RX ORDER — TRAMADOL HYDROCHLORIDE 50 MG/1
50 TABLET ORAL EVERY 6 HOURS PRN
Qty: 60 TABLET | Refills: 3 | Status: SHIPPED | OUTPATIENT
Start: 2023-01-12 | End: 2023-04-12

## 2023-01-12 RX ORDER — LISINOPRIL AND HYDROCHLOROTHIAZIDE 20; 12.5 MG/1; MG/1
1 TABLET ORAL 2 TIMES DAILY
Qty: 180 TABLET | Refills: 3 | Status: SHIPPED | OUTPATIENT
Start: 2023-01-12

## 2023-01-12 NOTE — TELEPHONE ENCOUNTER
Nik Hair called requesting a refill on the following medications:  Requested Prescriptions     Pending Prescriptions Disp Refills    traMADol (ULTRAM) 50 MG tablet 60 tablet 4     Sig: Take 1 tablet by mouth every 6 hours as needed for Pain for up to 90 days.     celecoxib (CELEBREX) 200 MG capsule 180 capsule 3     Sig: Take 1 capsule by mouth 2 times daily    atenolol (TENORMIN) 50 MG tablet 90 tablet 3     Sig: Take 1 tablet by mouth daily    lisinopril-hydroCHLOROthiazide (PRINZIDE;ZESTORETIC) 20-12.5 MG per tablet 180 tablet 3     Sig: Take 1 tablet by mouth 2 times daily    simvastatin (ZOCOR) 10 MG tablet 90 tablet 3     Sig: Take 1 tablet by mouth nightly    traZODone (DESYREL) 50 MG tablet 90 tablet 3     Sig: Take 1 tablet by mouth nightly as needed for Sleep       Date of last visit: 10/19/2022  Date of next visit (if applicable):4/19/2023  Date of last refill: 10-19-22  [facility]    Rx pending  [facility]      Thanks,  Julee Geller LPN

## 2023-01-23 ENCOUNTER — HOSPITAL ENCOUNTER (OUTPATIENT)
Dept: WOMENS IMAGING | Age: 73
Discharge: HOME | End: 2023-01-23
Payer: MEDICARE

## 2023-01-23 DIAGNOSIS — Z79.811 PROPHYLACTIC USE OF ANASTROZOLE (ARIMIDEX): ICD-10-CM

## 2023-01-23 DIAGNOSIS — Z17.0 MALIGNANT NEOPLASM OF LOWER-OUTER QUADRANT OF RIGHT BREAST OF FEMALE, ESTROGEN RECEPTOR POSITIVE (HCC): ICD-10-CM

## 2023-01-23 DIAGNOSIS — Z08 ENCOUNTER FOR FOLLOW-UP SURVEILLANCE OF BREAST CANCER: ICD-10-CM

## 2023-01-23 DIAGNOSIS — Z98.890 STATUS POST RIGHT BREAST LUMPECTOMY: ICD-10-CM

## 2023-01-23 DIAGNOSIS — Z85.3 ENCOUNTER FOR FOLLOW-UP SURVEILLANCE OF BREAST CANCER: ICD-10-CM

## 2023-01-23 DIAGNOSIS — C50.511 MALIGNANT NEOPLASM OF LOWER-OUTER QUADRANT OF RIGHT BREAST OF FEMALE, ESTROGEN RECEPTOR POSITIVE (HCC): ICD-10-CM

## 2023-01-23 PROCEDURE — 77080 DXA BONE DENSITY AXIAL: CPT

## 2023-01-26 ENCOUNTER — OFFICE VISIT (OUTPATIENT)
Dept: SURGERY | Age: 73
End: 2023-01-26

## 2023-01-26 VITALS
SYSTOLIC BLOOD PRESSURE: 132 MMHG | DIASTOLIC BLOOD PRESSURE: 80 MMHG | TEMPERATURE: 97 F | WEIGHT: 203.7 LBS | BODY MASS INDEX: 31.97 KG/M2 | HEART RATE: 67 BPM | OXYGEN SATURATION: 99 % | HEIGHT: 67 IN

## 2023-01-26 DIAGNOSIS — Z17.0 MALIGNANT NEOPLASM OF LOWER-OUTER QUADRANT OF RIGHT BREAST OF FEMALE, ESTROGEN RECEPTOR POSITIVE (HCC): Primary | ICD-10-CM

## 2023-01-26 DIAGNOSIS — C50.511 MALIGNANT NEOPLASM OF LOWER-OUTER QUADRANT OF RIGHT BREAST OF FEMALE, ESTROGEN RECEPTOR POSITIVE (HCC): Primary | ICD-10-CM

## 2023-02-03 ASSESSMENT — ENCOUNTER SYMPTOMS
RESPIRATORY NEGATIVE: 1
EYES NEGATIVE: 1
GASTROINTESTINAL NEGATIVE: 1
ALLERGIC/IMMUNOLOGIC NEGATIVE: 1

## 2023-02-08 ASSESSMENT — ENCOUNTER SYMPTOMS
CHEST TIGHTNESS: 0
NAUSEA: 0
SHORTNESS OF BREATH: 0
SORE THROAT: 0
ABDOMINAL PAIN: 0
BACK PAIN: 0
TROUBLE SWALLOWING: 0
COUGH: 0
CONSTIPATION: 0
VOMITING: 0
BLOOD IN STOOL: 0
DIARRHEA: 0

## 2023-02-08 NOTE — PROGRESS NOTES
Eder Bravo MD  [address]  Surgery  Clinic   Note    Pt Name: Georgia Rosario  Medical Record Number: [id number]  Date of Birth 1950   Today's Date: 2/8/2023    ASSESSMENT       ICD-10-CM    1. Malignant neoplasm of lower-outer quadrant of breast of female, estrogen receptor positive, unspecified laterality ([facility]  C50.519     Z17.0            PLAN   No signs of recurrence, her last mammogram was in August, this was approximately 7 months from her surgery. We will get another mammogram in 6 months and see me back. This is a year from her last mammogram.  Patient is to continue to follow with oncology for antiestrogen therapy  Doyce Person is doing well, she has no complaints. No palpable bladder issues no redness no drainage. No weight loss. .         Review of Systems   Constitutional:  Negative for appetite change, fatigue and fever. HENT:  Negative for ear pain, sore throat and trouble swallowing. Respiratory:  Negative for cough, chest tightness and shortness of breath. Cardiovascular:  Negative for chest pain, palpitations and leg swelling. Gastrointestinal:  Negative for abdominal pain, blood in stool, constipation, diarrhea, nausea and vomiting. Endocrine: Negative for cold intolerance. Genitourinary:  Negative for difficulty urinating and urgency. Musculoskeletal:  Negative for back pain and joint swelling. Skin:  Negative for rash and wound. Allergic/Immunologic: Negative for immunocompromised state. Neurological:  Negative for seizures and headaches. Psychiatric/Behavioral:  Negative for hallucinations and suicidal ideas. The patient is not nervous/anxious. CURRENT MEDICATIONS     Current Outpatient Medications on File Prior to Visit   Medication Sig Dispense Refill    traMADol (ULTRAM) 50 MG tablet Take 1 tablet by mouth every 6 hours as needed for Pain for up to 90 days.  60 tablet 3    celecoxib (CELEBREX) 200 MG capsule Take 1 capsule by mouth 2 times daily 180 capsule 3    atenolol (TENORMIN) 50 MG tablet Take 1 tablet by mouth daily 90 tablet 3    lisinopril-hydroCHLOROthiazide (PRINZIDE;ZESTORETIC) 20-12.5 MG per tablet Take 1 tablet by mouth 2 times daily 180 tablet 3    simvastatin (ZOCOR) 10 MG tablet Take 1 tablet by mouth nightly 90 tablet 3    traZODone (DESYREL) 50 MG tablet Take 1 tablet by mouth nightly as needed for Sleep 90 tablet 3    anastrozole (ARIMIDEX) 1 MG tablet Take 1 tablet by mouth daily 90 tablet 1    therapeutic multivitamin-minerals (THERAGRAN-M) tablet Take 1 tablet by mouth daily. [DISCONTINUED] conjugated estrogens (PREMARIN) 0.625 MG/GM vaginal cream Place vaginally daily. As needed. 1 Tube 3     No current facility-administered medications on file prior to visit. OBJECTIVE   CURRENT VITALS:  height is 5' 7\" (1.702 m) and weight is 203 lb 11.2 oz (92.4 kg). Her temporal temperature is 97 °F (36.1 °C). Her blood pressure is 132/80 and her pulse is 67. Her oxygen saturation is 99%. Physical Exam  Constitutional:       Appearance: She is well-developed. HENT:      Head: Normocephalic and atraumatic. Mouth/Throat:      Mouth: Mucous membranes are moist.      Pharynx: No oropharyngeal exudate. Eyes:      General: No scleral icterus. Extraocular Movements: Extraocular movements intact. Pupils: Pupils are equal, round, and reactive to light. Neck:      Thyroid: No thyromegaly. Trachea: No tracheal deviation. Cardiovascular:      Rate and Rhythm: Normal rate. Pulses: Normal pulses. Pulmonary:      Effort: Pulmonary effort is normal. No respiratory distress. Abdominal:      Palpations: Abdomen is soft. Tenderness: There is no abdominal tenderness. There is no guarding. Hernia: No hernia is present. Musculoskeletal:         General: No deformity. Normal range of motion. Cervical back: Normal range of motion and neck supple.    Skin:     General: Skin is warm. Findings: No rash. Neurological:      General: No focal deficit present. Mental Status: She is alert and oriented to person, place, and time. Psychiatric:         Mood and Affect: Mood normal.         Speech: Speech normal.         Behavior: Behavior normal.         Thought Content:  Thought content normal.        LABS and Pathology   na    RADIOLOGY   na    Electronically signed by Merlin Dock, MD on 2/8/2023 at 10:45 AM

## 2023-03-21 RX ORDER — ANASTROZOLE 1 MG/1
1 TABLET ORAL DAILY
Qty: 90 TABLET | Refills: 1 | Status: SHIPPED | OUTPATIENT
Start: 2023-03-21

## 2023-04-16 SDOH — ECONOMIC STABILITY: TRANSPORTATION INSECURITY
IN THE PAST 12 MONTHS, HAS LACK OF TRANSPORTATION KEPT YOU FROM MEETINGS, WORK, OR FROM GETTING THINGS NEEDED FOR DAILY LIVING?: NO

## 2023-04-16 SDOH — ECONOMIC STABILITY: HOUSING INSECURITY
IN THE LAST 12 MONTHS, WAS THERE A TIME WHEN YOU DID NOT HAVE A STEADY PLACE TO SLEEP OR SLEPT IN A SHELTER (INCLUDING NOW)?: NO

## 2023-04-16 SDOH — HEALTH STABILITY: PHYSICAL HEALTH: ON AVERAGE, HOW MANY MINUTES DO YOU ENGAGE IN EXERCISE AT THIS LEVEL?: 50 MIN

## 2023-04-16 SDOH — HEALTH STABILITY: PHYSICAL HEALTH: ON AVERAGE, HOW MANY DAYS PER WEEK DO YOU ENGAGE IN MODERATE TO STRENUOUS EXERCISE (LIKE A BRISK WALK)?: 6 DAYS

## 2023-04-16 SDOH — ECONOMIC STABILITY: INCOME INSECURITY: HOW HARD IS IT FOR YOU TO PAY FOR THE VERY BASICS LIKE FOOD, HOUSING, MEDICAL CARE, AND HEATING?: NOT VERY HARD

## 2023-04-16 SDOH — ECONOMIC STABILITY: FOOD INSECURITY: WITHIN THE PAST 12 MONTHS, THE FOOD YOU BOUGHT JUST DIDN'T LAST AND YOU DIDN'T HAVE MONEY TO GET MORE.: NEVER TRUE

## 2023-04-16 SDOH — ECONOMIC STABILITY: FOOD INSECURITY: WITHIN THE PAST 12 MONTHS, YOU WORRIED THAT YOUR FOOD WOULD RUN OUT BEFORE YOU GOT MONEY TO BUY MORE.: NEVER TRUE

## 2023-04-16 ASSESSMENT — LIFESTYLE VARIABLES
HOW OFTEN DO YOU HAVE A DRINK CONTAINING ALCOHOL: 1
HOW MANY STANDARD DRINKS CONTAINING ALCOHOL DO YOU HAVE ON A TYPICAL DAY: 0
HOW OFTEN DO YOU HAVE SIX OR MORE DRINKS ON ONE OCCASION: 1
HOW MANY STANDARD DRINKS CONTAINING ALCOHOL DO YOU HAVE ON A TYPICAL DAY: PATIENT DOES NOT DRINK
HOW OFTEN DO YOU HAVE A DRINK CONTAINING ALCOHOL: NEVER

## 2023-04-16 ASSESSMENT — PATIENT HEALTH QUESTIONNAIRE - PHQ9
SUM OF ALL RESPONSES TO PHQ QUESTIONS 1-9: 0
1. LITTLE INTEREST OR PLEASURE IN DOING THINGS: 0
2. FEELING DOWN, DEPRESSED OR HOPELESS: 0
SUM OF ALL RESPONSES TO PHQ9 QUESTIONS 1 & 2: 0
SUM OF ALL RESPONSES TO PHQ QUESTIONS 1-9: 0

## 2023-04-19 ENCOUNTER — OFFICE VISIT (OUTPATIENT)
Dept: FAMILY MEDICINE CLINIC | Age: 73
End: 2023-04-19

## 2023-04-19 VITALS
RESPIRATION RATE: 12 BRPM | WEIGHT: 201.4 LBS | SYSTOLIC BLOOD PRESSURE: 132 MMHG | DIASTOLIC BLOOD PRESSURE: 78 MMHG | HEART RATE: 56 BPM | BODY MASS INDEX: 31.61 KG/M2 | TEMPERATURE: 97.6 F | HEIGHT: 67 IN | OXYGEN SATURATION: 96 %

## 2023-04-19 DIAGNOSIS — F11.90 CHRONIC, CONTINUOUS USE OF OPIOIDS: ICD-10-CM

## 2023-04-19 DIAGNOSIS — Z23 NEED FOR VACCINATION: ICD-10-CM

## 2023-04-19 DIAGNOSIS — E11.9 TYPE 2 DIABETES MELLITUS WITHOUT COMPLICATION, WITHOUT LONG-TERM CURRENT USE OF INSULIN (HCC): ICD-10-CM

## 2023-04-19 DIAGNOSIS — I47.1 SVT (SUPRAVENTRICULAR TACHYCARDIA) (HCC): ICD-10-CM

## 2023-04-19 DIAGNOSIS — Z00.00 MEDICARE ANNUAL WELLNESS VISIT, SUBSEQUENT: Primary | ICD-10-CM

## 2023-04-19 LAB — HBA1C MFR BLD: 8.2 %

## 2023-04-19 RX ORDER — SYRING-NEEDL,DISP,INSUL,0.3 ML 30 GX5/16"
1 SYRINGE, EMPTY DISPOSABLE MISCELLANEOUS CONTINUOUS
Qty: 1 EACH | Refills: 0 | Status: SHIPPED | OUTPATIENT
Start: 2023-04-19

## 2023-04-19 RX ORDER — TRAMADOL HYDROCHLORIDE 50 MG/1
50 TABLET ORAL EVERY 6 HOURS PRN
Qty: 60 TABLET | Refills: 5 | Status: SHIPPED | OUTPATIENT
Start: 2023-04-19 | End: 2023-10-16

## 2023-04-19 RX ORDER — BLOOD-GLUCOSE METER
1 KIT MISCELLANEOUS DAILY
Qty: 1 KIT | Refills: 0 | Status: SHIPPED | OUTPATIENT
Start: 2023-04-19

## 2023-04-19 RX ORDER — GLUCOSAMINE HCL/CHONDROITIN SU 500-400 MG
CAPSULE ORAL
Qty: 100 STRIP | Refills: 3 | Status: SHIPPED | OUTPATIENT
Start: 2023-04-19

## 2023-04-19 NOTE — PATIENT INSTRUCTIONS
an eye specialist is recommended every 1-2 years to screen for glaucoma; cataracts, macular degeneration, and other eye disorders. A preventive dental visit is recommended every 6 months. Try to get at least 150 minutes of exercise per week or 10,000 steps per day on a pedometer . Order or download the FREE \"Exercise & Physical Activity: Your Everyday Guide\" from [employer]. Call 4-853.754.6994 or search [employer] online. You need 6133-0909 mg of calcium and 7201-2836 IU of vitamin D per day. It is possible to meet your calcium requirement with diet alone, but a vitamin D supplement is usually necessary to meet this goal.  When exposed to the sun, use a sunscreen that protects against both UVA and UVB radiation with an SPF of 30 or greater. Reapply every 2 to 3 hours or after sweating, drying off with a towel, or swimming. Always wear a seat belt when traveling in a car. Always wear a helmet when riding a bicycle or motorcycle.

## 2023-04-19 NOTE — PROGRESS NOTES
Medicare Annual Wellness Visit    Jazmyne Cam is here for Medicare AWV    Assessment & Plan   Medicare annual wellness visit, subsequent  Chronic, continuous use of opioids  -     traMADol (ULTRAM) 50 MG tablet; Take 1 tablet by mouth every 6 hours as needed for Pain for up to 180 days. Intended supply: 7 days. Take lowest dose possible to manage pain Max Daily Amount: 200 mg, Disp-60 tablet, R-5Normal  SVT (supraventricular tachycardia) (HCC)  Type 2 diabetes mellitus without complication, without long-term current use of insulin (HCC)  -     POCT glycosylated hemoglobin (Hb A1C)  -     metFORMIN (GLUCOPHAGE) 500 MG tablet; Take 1 tablet by mouth 2 times daily (with meals), Disp-60 tablet, R-0Normal  -     glucose monitoring (FREESTYLE FREEDOM) kit; DAILY Starting Wed 4/19/2023, Disp-1 kit, R-0, Normal  -     Lancet Device MISC; CONTINUOUS Starting Wed 4/19/2023, Disp-1 each, R-0, NormalMay use any brand  -     blood glucose monitor strips; Test 1 times a day & as needed for symptoms of irregular blood glucose. Dispense sufficient amount for indicated testing frequency plus additional to accommodate PRN testing needs. , Disp-100 strip, R-3, Normal  Need for vaccination  -     Pneumococcal, PCV20, PREVNAR 20, (age 25 yrs+), IM, PF    Recommendations for Preventive Services Due: see orders and patient instructions/AVS.    Continued uncontrolled diabetes. Discussed risks of uncontrolled diabetes and options. Metformin -- Possible side effects discussed and patient voiced understanding. Start metformin 500 mg with evening meal for 2 weeks, then increase to morning and evening meal as long as not having low sugar (< 70). Goal for am fasting blood sugar < 120   And < 180 for post-prandial (2 hours after eating) or bedtime    To protect your eyes from damage due to diabetes, please schedule an appointment with your ophthalmologist. Please let us know if you need help doing this.     dietary counseling

## 2023-04-19 NOTE — PROGRESS NOTES
Immunization(s) given during visit:    Immunizations Administered       Name Date Dose Route    Pneumococcal, PCV20, PREVNAR 21, (age 18y+), IM, 0.5mL 4/19/2023 0.5 mL Intramuscular    Site: Deltoid- Left    Lot: [id number]    NDC: 3997-3089-47

## 2023-04-24 ENCOUNTER — TELEPHONE (OUTPATIENT)
Dept: FAMILY MEDICINE CLINIC | Age: 73
End: 2023-04-24

## 2023-04-24 NOTE — TELEPHONE ENCOUNTER
----- Message from Gregor Orourke MD sent at 4/19/2023  5:15 PM EDT -----  Please obtain podiatry note for foot exam including neuropathy check, from Dr. Janna Martin.

## 2023-05-22 DIAGNOSIS — E11.9 TYPE 2 DIABETES MELLITUS WITHOUT COMPLICATION, WITHOUT LONG-TERM CURRENT USE OF INSULIN (HCC): ICD-10-CM

## 2023-05-22 NOTE — TELEPHONE ENCOUNTER
Fredy Hernadez called requesting a refill on the following medications:  Requested Prescriptions     Pending Prescriptions Disp Refills    metFORMIN (GLUCOPHAGE) 500 MG tablet 60 tablet 0     Sig: Take 1 tablet by mouth 2 times daily (with meals)       Date of last visit: 4/19/2023  Date of next visit (if applicable):7/25/2023  Date of last refill: 4/19/2023  Pharmacy Name: [facility],  [place], [place]

## 2023-05-23 ENCOUNTER — OFFICE VISIT (OUTPATIENT)
Dept: ONCOLOGY | Age: 73
End: 2023-05-23
Payer: MEDICARE

## 2023-05-23 ENCOUNTER — HOSPITAL ENCOUNTER (OUTPATIENT)
Dept: INFUSION THERAPY | Age: 73
Discharge: HOME | End: 2023-05-23
Payer: MEDICARE

## 2023-05-23 VITALS
DIASTOLIC BLOOD PRESSURE: 70 MMHG | SYSTOLIC BLOOD PRESSURE: 142 MMHG | RESPIRATION RATE: 16 BRPM | HEIGHT: 67 IN | HEART RATE: 63 BPM | WEIGHT: 200 LBS | OXYGEN SATURATION: 97 % | TEMPERATURE: 98.3 F | BODY MASS INDEX: 31.39 KG/M2

## 2023-05-23 VITALS
TEMPERATURE: 98.3 F | HEART RATE: 63 BPM | RESPIRATION RATE: 16 BRPM | SYSTOLIC BLOOD PRESSURE: 142 MMHG | DIASTOLIC BLOOD PRESSURE: 70 MMHG | OXYGEN SATURATION: 97 %

## 2023-05-23 DIAGNOSIS — Z17.0 MALIGNANT NEOPLASM OF LOWER-OUTER QUADRANT OF RIGHT BREAST OF FEMALE, ESTROGEN RECEPTOR POSITIVE (HCC): Primary | ICD-10-CM

## 2023-05-23 DIAGNOSIS — C50.511 MALIGNANT NEOPLASM OF LOWER-OUTER QUADRANT OF RIGHT BREAST OF FEMALE, ESTROGEN RECEPTOR POSITIVE (HCC): ICD-10-CM

## 2023-05-23 DIAGNOSIS — C50.511 MALIGNANT NEOPLASM OF LOWER-OUTER QUADRANT OF RIGHT BREAST OF FEMALE, ESTROGEN RECEPTOR POSITIVE (HCC): Primary | ICD-10-CM

## 2023-05-23 DIAGNOSIS — Z17.0 MALIGNANT NEOPLASM OF LOWER-OUTER QUADRANT OF RIGHT BREAST OF FEMALE, ESTROGEN RECEPTOR POSITIVE (HCC): ICD-10-CM

## 2023-05-23 LAB
ABSOLUTE IMMATURE GRANULOCYTE: 0 THOU/MM3 (ref 0–0.07)
ALBUMIN SERPL BCG-MCNC: 4.6 G/DL (ref 3.5–5.1)
ALP SERPL-CCNC: 96 U/L (ref 38–126)
ALT SERPL W/O P-5'-P-CCNC: 16 U/L (ref 11–66)
AST SERPL-CCNC: 21 U/L (ref 5–40)
BASOPHILS ABSOLUTE: 0 THOU/MM3 (ref 0–0.1)
BASOPHILS NFR BLD AUTO: 0 % (ref 0–3)
BILIRUB CONJ SERPL-MCNC: < 0.2 MG/DL (ref 0–0.3)
BILIRUB SERPL-MCNC: 0.3 MG/DL (ref 0.3–1.2)
BUN BLDP-MCNC: 26 MG/DL (ref 8–26)
CHLORIDE BLD-SCNC: 97 MEQ/L (ref 98–109)
CREAT BLD-MCNC: 1.1 MG/DL (ref 0.5–1.2)
EOSINOPHIL NFR BLD AUTO: 2 % (ref 0–4)
EOSINOPHILS ABSOLUTE: 0.1 THOU/MM3 (ref 0–0.4)
ERYTHROCYTE [DISTWIDTH] IN BLOOD BY AUTOMATED COUNT: 12 % (ref 11.5–14.5)
GFR SERPL CREATININE-BSD FRML MDRD: 53 ML/MIN/1.73M2
GLUCOSE BLD-MCNC: 173 MG/DL (ref 70–108)
HCT VFR BLD AUTO: 35.2 % (ref 37–47)
HGB BLD-MCNC: 12.2 GM/DL (ref 12–16)
IMMATURE GRANULOCYTES: 0 %
IONIZED CALCIUM, WHOLE BLOOD: 1.23 MMOL/L (ref 1.12–1.32)
LYMPHOCYTES ABSOLUTE: 1.6 THOU/MM3 (ref 1–4.8)
LYMPHOCYTES NFR BLD AUTO: 27 % (ref 15–47)
MCH RBC QN AUTO: 30.8 PG (ref 26–33)
MCHC RBC AUTO-ENTMCNC: 34.7 GM/DL (ref 32.2–35.5)
MCV RBC AUTO: 89 FL (ref 81–99)
MONOCYTES ABSOLUTE: 0.5 THOU/MM3 (ref 0.4–1.3)
MONOCYTES NFR BLD AUTO: 8 % (ref 0–12)
NEUTROPHILS NFR BLD AUTO: 63 % (ref 43–75)
PLATELET # BLD AUTO: 224 THOU/MM3 (ref 130–400)
PMV BLD AUTO: 8.6 FL (ref 9.4–12.4)
POTASSIUM BLD-SCNC: 4 MEQ/L (ref 3.5–4.9)
PROT SERPL-MCNC: 6.7 G/DL (ref 6.1–8)
RBC # BLD AUTO: 3.96 MILL/MM3 (ref 4.2–5.4)
SEGMENTED NEUTROPHILS ABSOLUTE COUNT: 3.7 THOU/MM3 (ref 1.8–7.7)
SODIUM BLD-SCNC: 132 MEQ/L (ref 138–146)
TOTAL CO2, WHOLE BLOOD: 27 MEQ/L (ref 23–33)
WBC # BLD AUTO: 5.9 THOU/MM3 (ref 4.8–10.8)

## 2023-05-23 PROCEDURE — G8417 CALC BMI ABV UP PARAM F/U: HCPCS | Performed by: INTERNAL MEDICINE

## 2023-05-23 PROCEDURE — 80076 HEPATIC FUNCTION PANEL: CPT

## 2023-05-23 PROCEDURE — 99211 OFF/OP EST MAY X REQ PHY/QHP: CPT

## 2023-05-23 PROCEDURE — 1090F PRES/ABSN URINE INCON ASSESS: CPT | Performed by: INTERNAL MEDICINE

## 2023-05-23 PROCEDURE — G8427 DOCREV CUR MEDS BY ELIG CLIN: HCPCS | Performed by: INTERNAL MEDICINE

## 2023-05-23 PROCEDURE — 3078F DIAST BP <80 MM HG: CPT | Performed by: INTERNAL MEDICINE

## 2023-05-23 PROCEDURE — 99215 OFFICE O/P EST HI 40 MIN: CPT | Performed by: INTERNAL MEDICINE

## 2023-05-23 PROCEDURE — 36415 COLL VENOUS BLD VENIPUNCTURE: CPT

## 2023-05-23 PROCEDURE — 80047 BASIC METABLC PNL IONIZED CA: CPT

## 2023-05-23 PROCEDURE — 1036F TOBACCO NON-USER: CPT | Performed by: INTERNAL MEDICINE

## 2023-05-23 PROCEDURE — G9899 SCRN MAM PERF RSLTS DOC: HCPCS | Performed by: INTERNAL MEDICINE

## 2023-05-23 PROCEDURE — G8399 PT W/DXA RESULTS DOCUMENT: HCPCS | Performed by: INTERNAL MEDICINE

## 2023-05-23 PROCEDURE — 1123F ACP DISCUSS/DSCN MKR DOCD: CPT | Performed by: INTERNAL MEDICINE

## 2023-05-23 PROCEDURE — 85025 COMPLETE CBC W/AUTO DIFF WBC: CPT

## 2023-05-23 PROCEDURE — 3077F SYST BP >= 140 MM HG: CPT | Performed by: INTERNAL MEDICINE

## 2023-05-23 PROCEDURE — 3017F COLORECTAL CA SCREEN DOC REV: CPT | Performed by: INTERNAL MEDICINE

## 2023-05-23 ASSESSMENT — ENCOUNTER SYMPTOMS
NAUSEA: 0
TROUBLE SWALLOWING: 0
VOMITING: 0
COUGH: 0
RHINORRHEA: 0
DIARRHEA: 0
SHORTNESS OF BREATH: 0
ABDOMINAL PAIN: 0

## 2023-05-23 NOTE — PATIENT INSTRUCTIONS
Reviewed labs and recent medical history. Continue Arimidex as directed. She will call if she needs refills.   Return to see NP in 3 months for follow up

## 2023-07-12 ENCOUNTER — OFFICE VISIT (OUTPATIENT)
Dept: FAMILY MEDICINE CLINIC | Age: 73
End: 2023-07-12
Payer: MEDICARE

## 2023-07-12 VITALS
HEART RATE: 63 BPM | TEMPERATURE: 97.7 F | WEIGHT: 196 LBS | BODY MASS INDEX: 30.76 KG/M2 | DIASTOLIC BLOOD PRESSURE: 72 MMHG | SYSTOLIC BLOOD PRESSURE: 142 MMHG | HEIGHT: 67 IN | OXYGEN SATURATION: 98 %

## 2023-07-12 DIAGNOSIS — E11.9 TYPE 2 DIABETES MELLITUS WITHOUT COMPLICATION, WITHOUT LONG-TERM CURRENT USE OF INSULIN (HCC): ICD-10-CM

## 2023-07-12 DIAGNOSIS — I10 ESSENTIAL HYPERTENSION: Chronic | ICD-10-CM

## 2023-07-12 DIAGNOSIS — E78.00 PURE HYPERCHOLESTEROLEMIA: Chronic | ICD-10-CM

## 2023-07-12 DIAGNOSIS — E11.9 TYPE 2 DIABETES MELLITUS WITHOUT COMPLICATION, WITHOUT LONG-TERM CURRENT USE OF INSULIN (HCC): Primary | Chronic | ICD-10-CM

## 2023-07-12 PROCEDURE — 1123F ACP DISCUSS/DSCN MKR DOCD: CPT | Performed by: FAMILY MEDICINE

## 2023-07-12 PROCEDURE — G8417 CALC BMI ABV UP PARAM F/U: HCPCS | Performed by: FAMILY MEDICINE

## 2023-07-12 PROCEDURE — 3078F DIAST BP <80 MM HG: CPT | Performed by: FAMILY MEDICINE

## 2023-07-12 PROCEDURE — 3017F COLORECTAL CA SCREEN DOC REV: CPT | Performed by: FAMILY MEDICINE

## 2023-07-12 PROCEDURE — G8399 PT W/DXA RESULTS DOCUMENT: HCPCS | Performed by: FAMILY MEDICINE

## 2023-07-12 PROCEDURE — G9899 SCRN MAM PERF RSLTS DOC: HCPCS | Performed by: FAMILY MEDICINE

## 2023-07-12 PROCEDURE — 3052F HG A1C>EQUAL 8.0%<EQUAL 9.0%: CPT | Performed by: FAMILY MEDICINE

## 2023-07-12 PROCEDURE — 3077F SYST BP >= 140 MM HG: CPT | Performed by: FAMILY MEDICINE

## 2023-07-12 PROCEDURE — 2022F DILAT RTA XM EVC RTNOPTHY: CPT | Performed by: FAMILY MEDICINE

## 2023-07-12 PROCEDURE — 99213 OFFICE O/P EST LOW 20 MIN: CPT | Performed by: FAMILY MEDICINE

## 2023-07-12 PROCEDURE — 1036F TOBACCO NON-USER: CPT | Performed by: FAMILY MEDICINE

## 2023-07-12 PROCEDURE — G8427 DOCREV CUR MEDS BY ELIG CLIN: HCPCS | Performed by: FAMILY MEDICINE

## 2023-07-12 PROCEDURE — 1090F PRES/ABSN URINE INCON ASSESS: CPT | Performed by: FAMILY MEDICINE

## 2023-07-12 NOTE — TELEPHONE ENCOUNTER
Vega Carr called requesting a refill on the following medications:  Requested Prescriptions     Pending Prescriptions Disp Refills    metFORMIN (GLUCOPHAGE) 500 MG tablet [Pharmacy Med Name: METFORMIN  MG TABLET] 60 tablet 0     Sig: take 1 tablet by mouth twice a day with meals       Date of last visit: 6/14/2023  Date of next visit (if applicable):7/12/2023  Date of last refill: 5/22/2023  Pharm[facility] Name: [facility]     Rx pending 60/0    Thanks,  Guille Bell LPN

## 2023-07-12 NOTE — PROGRESS NOTES
Farooq Strong (:  1950) is a 68 y.o. female, Established, here for a shared medical appointment:  Diabetes (Group Education) and Health Maintenance (Will call Dr Gaston Salinas for Eye exam)         ASSESSMENT/PLAN:  1. Type 2 diabetes mellitus without complication, without long-term current use of insulin ([address])  2. Essential hypertension  3. Pure hypercholesterolemia    Patient participated in group visit today. They are an established patient of the practice. Diabetes education provided along with detailed dietary counseling.   -- focus this visit: process of lifestyle changes, meal planning and healthy grocery shopping. Adequate time and staff were provided during the visit for discussion. Patient was allowed one-to-one time during the visit, if patient requested. Initial blood pressure and follow-up blood pressure were elevated mildly. Patient is taking her medication consistently. We will keep an eye on this and we will see her in the office in a couple of weeks. A1c is due next week. Return for  -- also already in GV#3. Subjective   SUBJECTIVE/OBJECTIVE:  HPI    Patient with DM ii presenting for group visit or shared medical appointment. Patient relates that eating healthy is still new to her and she still learning lots. She has been able to lose a couple of pounds. She is been taking her medication consistently. She is tolerating her medications well. Metformin 500 mg twice daily is being tolerated. A1c will be checked at the next office visit. She is on Tenormin 50 mg daily along with her lisinopril-hydrochlorothiazide twice daily. She is also on Celebrex 200 mg twice a day.     BP Readings from Last 3 Encounters:   23 (!) 142/72   23 138/68   23 (!) 142/70     Wt Readings from Last 3 Encounters:   23 196 lb (88.9 kg)   23 200 lb (90.7 kg)   23 200 lb (90.7 kg)         Lab Results   Component Value Date    LABA1C 8.2

## 2023-07-17 DIAGNOSIS — E11.9 TYPE 2 DIABETES MELLITUS WITHOUT COMPLICATION, WITHOUT LONG-TERM CURRENT USE OF INSULIN (HCC): ICD-10-CM

## 2023-07-17 NOTE — TELEPHONE ENCOUNTER
Jakob Cohen called requesting a refill on the following medications:  Requested Prescriptions     Pending Prescriptions Disp Refills    metFORMIN (GLUCOPHAGE) 500 MG tablet 180 tablet 1     Sig: Take 1 tablet by mouth 2 times daily (with meals)       Date of last visit: 7/12/2023  Date of next visit (if applicable):7/25/2023  Date of last refill:  Pharmacy Name: [address] [place] [address]    Asking for 90 days supply due to cost.      Thanks,  Saige Wren, [address]
[employer]borne[employer]Contact precautions

## 2023-07-25 ENCOUNTER — OFFICE VISIT (OUTPATIENT)
Dept: FAMILY MEDICINE CLINIC | Age: 73
End: 2023-07-25
Payer: MEDICARE

## 2023-07-25 VITALS
HEIGHT: 67 IN | TEMPERATURE: 97.3 F | DIASTOLIC BLOOD PRESSURE: 74 MMHG | HEART RATE: 80 BPM | BODY MASS INDEX: 30.29 KG/M2 | SYSTOLIC BLOOD PRESSURE: 136 MMHG | OXYGEN SATURATION: 98 % | WEIGHT: 193 LBS

## 2023-07-25 DIAGNOSIS — I10 ESSENTIAL HYPERTENSION: Chronic | ICD-10-CM

## 2023-07-25 DIAGNOSIS — E78.00 PURE HYPERCHOLESTEROLEMIA: Chronic | ICD-10-CM

## 2023-07-25 DIAGNOSIS — E11.9 TYPE 2 DIABETES MELLITUS WITHOUT COMPLICATION, WITHOUT LONG-TERM CURRENT USE OF INSULIN (HCC): Primary | ICD-10-CM

## 2023-07-25 LAB — HBA1C MFR BLD: 7.3 %

## 2023-07-25 PROCEDURE — G9899 SCRN MAM PERF RSLTS DOC: HCPCS | Performed by: FAMILY MEDICINE

## 2023-07-25 PROCEDURE — 3017F COLORECTAL CA SCREEN DOC REV: CPT | Performed by: FAMILY MEDICINE

## 2023-07-25 PROCEDURE — G8399 PT W/DXA RESULTS DOCUMENT: HCPCS | Performed by: FAMILY MEDICINE

## 2023-07-25 PROCEDURE — 3074F SYST BP LT 130 MM HG: CPT | Performed by: FAMILY MEDICINE

## 2023-07-25 PROCEDURE — 1090F PRES/ABSN URINE INCON ASSESS: CPT | Performed by: FAMILY MEDICINE

## 2023-07-25 PROCEDURE — 3051F HG A1C>EQUAL 7.0%<8.0%: CPT | Performed by: FAMILY MEDICINE

## 2023-07-25 PROCEDURE — 1123F ACP DISCUSS/DSCN MKR DOCD: CPT | Performed by: FAMILY MEDICINE

## 2023-07-25 PROCEDURE — 1036F TOBACCO NON-USER: CPT | Performed by: FAMILY MEDICINE

## 2023-07-25 PROCEDURE — 2022F DILAT RTA XM EVC RTNOPTHY: CPT | Performed by: FAMILY MEDICINE

## 2023-07-25 PROCEDURE — G8427 DOCREV CUR MEDS BY ELIG CLIN: HCPCS | Performed by: FAMILY MEDICINE

## 2023-07-25 PROCEDURE — G8417 CALC BMI ABV UP PARAM F/U: HCPCS | Performed by: FAMILY MEDICINE

## 2023-07-25 PROCEDURE — 3078F DIAST BP <80 MM HG: CPT | Performed by: FAMILY MEDICINE

## 2023-07-25 PROCEDURE — 99214 OFFICE O/P EST MOD 30 MIN: CPT | Performed by: FAMILY MEDICINE

## 2023-07-25 PROCEDURE — 83037 HB GLYCOSYLATED A1C HOME DEV: CPT | Performed by: FAMILY MEDICINE

## 2023-07-25 NOTE — PROGRESS NOTES
Salena Wetzel (:  1950) is a 68 y.o. female,Established patient, here for evaluation of the following chief complaint(s):  Diabetes and Hypertension       ASSESSMENT/PLAN:  1. Type 2 diabetes mellitus without complication, without long-term current use of insulin (HCC)  -     POCT glycosylated hemoglobin (Hb A1C)  -     Comprehensive Metabolic Panel; Future  -     CBC with Auto Differential; Future  -     Hemoglobin A1C; Future  -     Lipid Panel; Future  -     TSH with Reflex; Future  -     Microalbumin / Creatinine Urine Ratio; Future  -     Vitamin B12; Future  2. Essential hypertension  -     Comprehensive Metabolic Panel; Future  -     CBC with Auto Differential; Future  -     Hemoglobin A1C; Future  -     Lipid Panel; Future  -     TSH with Reflex; Future  -     Microalbumin / Creatinine Urine Ratio; Future  -     Vitamin B12; Future  3. Pure hypercholesterolemia  -     Comprehensive Metabolic Panel; Future  -     CBC with Auto Differential; Future  -     Hemoglobin A1C; Future  -     Lipid Panel; Future  -     TSH with Reflex; Future  -     Microalbumin / Creatinine Urine Ratio; Future  -     Vitamin B12; Future    Patient's blood sugar has improved. Goal is < 7.0, so not controlled yet. BP and cholesterol are stable. On statin and on ACE-1. Will increase metformin to BID, as long as she tolerates. She continues to learn how to eat better for her conditions. A healthy diet for her diabetes will also be a healthy diet for her breast cancer management. Dietary counseling given in detail according to patient's questions and concerns. Also shared some options for healthy convenience. Return for  for group visit. then in 6 mo for DM individual office visit. .         Subjective   SUBJECTIVE/OBJECTIVE:  HPI  Patient following for diabetes mellitus type 2, hypertension hyperlipidemia. Patient overall is doing and feeling better.   She has been attending group visits which have

## 2023-07-25 NOTE — PATIENT INSTRUCTIONS
Check out:  -- [address] meals     Following a 1800 kcal healthy high fiber mediterranean style diet  -- okay for 180 carbohydrate grams  -- aiming whole carbs/foods (carb:fiber 10:1)    Aiming for 25+ grams of fiber per day   -- vegetables, legumes, whole grains, fruits, nuts/seeds    Limit   -- Sodium to 2400 mg (2.4 grams)   -- added sugar < 25 grams

## 2023-07-26 ASSESSMENT — ENCOUNTER SYMPTOMS: SHORTNESS OF BREATH: 0

## 2023-08-09 ENCOUNTER — TELEPHONE (OUTPATIENT)
Dept: FAMILY MEDICINE CLINIC | Age: 73
End: 2023-08-09

## 2023-08-10 NOTE — TELEPHONE ENCOUNTER
Please let patient know. This message is to inform you that the patient has not yet read the following message. (Notification date: August 9, 2023)     labs ordered for January visit    From   Fatemeh Alexis MD To   Félix Reyes \"Karny\" Sent and Delivered   7/26/2023  8:58 AM       Hello! After your visit, I decided to plan ahead with labs not due until January. So, you will see orders for that visit next year. Let us know if you would like us to fax the paper orders to help as a reminder. Great job on [employer] [facility] [employer] and your health!      Macario Pina MD       Audit Wittmann    [employer] User Last Read On   Félix Reyes \"Karyn\"

## 2023-08-10 NOTE — TELEPHONE ENCOUNTER
Spoke with patient and patient reports she did not read the my chart message but received the lab orders for January in the mail last week.

## 2023-08-16 ENCOUNTER — OFFICE VISIT (OUTPATIENT)
Dept: FAMILY MEDICINE CLINIC | Age: 73
End: 2023-08-16
Payer: MEDICARE

## 2023-08-16 VITALS
SYSTOLIC BLOOD PRESSURE: 138 MMHG | BODY MASS INDEX: 30.42 KG/M2 | OXYGEN SATURATION: 99 % | DIASTOLIC BLOOD PRESSURE: 80 MMHG | HEART RATE: 66 BPM | TEMPERATURE: 98 F | RESPIRATION RATE: 16 BRPM | WEIGHT: 193.8 LBS | HEIGHT: 67 IN

## 2023-08-16 DIAGNOSIS — I10 ESSENTIAL HYPERTENSION: Chronic | ICD-10-CM

## 2023-08-16 DIAGNOSIS — E11.9 TYPE 2 DIABETES MELLITUS WITHOUT COMPLICATION, WITHOUT LONG-TERM CURRENT USE OF INSULIN (HCC): Primary | Chronic | ICD-10-CM

## 2023-08-16 DIAGNOSIS — E78.00 PURE HYPERCHOLESTEROLEMIA: Chronic | ICD-10-CM

## 2023-08-16 PROCEDURE — G9899 SCRN MAM PERF RSLTS DOC: HCPCS | Performed by: FAMILY MEDICINE

## 2023-08-16 PROCEDURE — 1090F PRES/ABSN URINE INCON ASSESS: CPT | Performed by: FAMILY MEDICINE

## 2023-08-16 PROCEDURE — 1036F TOBACCO NON-USER: CPT | Performed by: FAMILY MEDICINE

## 2023-08-16 PROCEDURE — G8427 DOCREV CUR MEDS BY ELIG CLIN: HCPCS | Performed by: FAMILY MEDICINE

## 2023-08-16 PROCEDURE — 3051F HG A1C>EQUAL 7.0%<8.0%: CPT | Performed by: FAMILY MEDICINE

## 2023-08-16 PROCEDURE — G8417 CALC BMI ABV UP PARAM F/U: HCPCS | Performed by: FAMILY MEDICINE

## 2023-08-16 PROCEDURE — 2022F DILAT RTA XM EVC RTNOPTHY: CPT | Performed by: FAMILY MEDICINE

## 2023-08-16 PROCEDURE — 3075F SYST BP GE 130 - 139MM HG: CPT | Performed by: FAMILY MEDICINE

## 2023-08-16 PROCEDURE — 99213 OFFICE O/P EST LOW 20 MIN: CPT | Performed by: FAMILY MEDICINE

## 2023-08-16 PROCEDURE — 3079F DIAST BP 80-89 MM HG: CPT | Performed by: FAMILY MEDICINE

## 2023-08-16 PROCEDURE — 3017F COLORECTAL CA SCREEN DOC REV: CPT | Performed by: FAMILY MEDICINE

## 2023-08-16 PROCEDURE — G8399 PT W/DXA RESULTS DOCUMENT: HCPCS | Performed by: FAMILY MEDICINE

## 2023-08-16 PROCEDURE — 1123F ACP DISCUSS/DSCN MKR DOCD: CPT | Performed by: FAMILY MEDICINE

## 2023-08-16 NOTE — PROGRESS NOTES
Tim Fletcher (:  1950) is a 68 y.o. female, Established, here for a shared medical appointment:  Diabetes (Diabetes group education. Denies any issues or concerns )         ASSESSMENT/PLAN:  1. Type 2 diabetes mellitus without complication, without long-term current use of insulin ([address])  2. Essential hypertension  3. Pure hypercholesterolemia      Patient participated in group visit today. They are an established patient of the practice. Diabetes education provided along with detailed dietary counseling.   -- understanding why eat, macronutrient distribution in our diet and focus on Mediterranean Diet    Adequate time and staff were provided during the visit for discussion. Patient was allowed one-to-one time during the visit, if patient requested. Return for  [facility], 2024 for next OV. Subjective   SUBJECTIVE/OBJECTIVE:  HPI    Patient with DM ii presenting for group visit or shared medical appointment.       BP Readings from Last 3 Encounters:   23 138/80   23 136/74   23 (!) 142/72     Wt Readings from Last 3 Encounters:   23 193 lb 12.8 oz (87.9 kg)   23 193 lb (87.5 kg)   23 196 lb (88.9 kg)     Lab Results   Component Value Date    LABA1C 7.3 2023    LABA1C 8.2 2023    LABA1C 8.0 (H) 01/10/2023     Lab Results   Component Value Date    CHOL 175 10/11/2022    CHOL 173 10/12/2021    CHOL 165 2021     Lab Results   Component Value Date    TRIG 151 10/11/2022    TRIG 108 10/12/2021    TRIG 107 2021     Lab Results   Component Value Date    HDL 42 10/11/2022    HDL 51 10/12/2021    HDL 46 2021     Lab Results   Component Value Date    LDLCALC 103 10/11/2022    LDLCALC 100 10/12/2021    LDLCALC 98 2021     Lab Results   Component Value Date    LABVLDL 19 2015    VLDL 22 10/12/2021    VLDL 25 2016     Lab Results   Component Value Date    CHOLHDLRATIO 4.8 2015     Lab Results   Component Value

## 2023-08-23 ENCOUNTER — HOSPITAL ENCOUNTER (OUTPATIENT)
Dept: INFUSION THERAPY | Age: 73
Discharge: HOME | End: 2023-08-23
Payer: MEDICARE

## 2023-08-23 ENCOUNTER — OFFICE VISIT (OUTPATIENT)
Dept: ONCOLOGY | Age: 73
End: 2023-08-23
Payer: MEDICARE

## 2023-08-23 VITALS
RESPIRATION RATE: 16 BRPM | WEIGHT: 190.6 LBS | DIASTOLIC BLOOD PRESSURE: 64 MMHG | TEMPERATURE: 98.2 F | OXYGEN SATURATION: 100 % | SYSTOLIC BLOOD PRESSURE: 148 MMHG | BODY MASS INDEX: 29.91 KG/M2 | HEART RATE: 64 BPM | HEIGHT: 67 IN

## 2023-08-23 VITALS
OXYGEN SATURATION: 100 % | TEMPERATURE: 98.2 F | RESPIRATION RATE: 16 BRPM | HEART RATE: 64 BPM | DIASTOLIC BLOOD PRESSURE: 64 MMHG | SYSTOLIC BLOOD PRESSURE: 148 MMHG

## 2023-08-23 DIAGNOSIS — Z85.3 ENCOUNTER FOR FOLLOW-UP SURVEILLANCE OF BREAST CANCER: ICD-10-CM

## 2023-08-23 DIAGNOSIS — N64.51 HARDNESS OF BREAST: Primary | ICD-10-CM

## 2023-08-23 DIAGNOSIS — Z17.0 MALIGNANT NEOPLASM OF LOWER-OUTER QUADRANT OF RIGHT BREAST OF FEMALE, ESTROGEN RECEPTOR POSITIVE (HCC): ICD-10-CM

## 2023-08-23 DIAGNOSIS — Z79.811 PROPHYLACTIC USE OF ANASTROZOLE (ARIMIDEX): ICD-10-CM

## 2023-08-23 DIAGNOSIS — C50.511 MALIGNANT NEOPLASM OF LOWER-OUTER QUADRANT OF RIGHT BREAST OF FEMALE, ESTROGEN RECEPTOR POSITIVE (HCC): ICD-10-CM

## 2023-08-23 DIAGNOSIS — Z08 ENCOUNTER FOR FOLLOW-UP SURVEILLANCE OF BREAST CANCER: ICD-10-CM

## 2023-08-23 PROCEDURE — G8417 CALC BMI ABV UP PARAM F/U: HCPCS | Performed by: NURSE PRACTITIONER

## 2023-08-23 PROCEDURE — G9899 SCRN MAM PERF RSLTS DOC: HCPCS | Performed by: NURSE PRACTITIONER

## 2023-08-23 PROCEDURE — 1036F TOBACCO NON-USER: CPT | Performed by: NURSE PRACTITIONER

## 2023-08-23 PROCEDURE — 99211 OFF/OP EST MAY X REQ PHY/QHP: CPT

## 2023-08-23 PROCEDURE — 3078F DIAST BP <80 MM HG: CPT | Performed by: NURSE PRACTITIONER

## 2023-08-23 PROCEDURE — 1123F ACP DISCUSS/DSCN MKR DOCD: CPT | Performed by: NURSE PRACTITIONER

## 2023-08-23 PROCEDURE — 1090F PRES/ABSN URINE INCON ASSESS: CPT | Performed by: NURSE PRACTITIONER

## 2023-08-23 PROCEDURE — G8427 DOCREV CUR MEDS BY ELIG CLIN: HCPCS | Performed by: NURSE PRACTITIONER

## 2023-08-23 PROCEDURE — G8399 PT W/DXA RESULTS DOCUMENT: HCPCS | Performed by: NURSE PRACTITIONER

## 2023-08-23 PROCEDURE — 3077F SYST BP >= 140 MM HG: CPT | Performed by: NURSE PRACTITIONER

## 2023-08-23 PROCEDURE — 99214 OFFICE O/P EST MOD 30 MIN: CPT | Performed by: NURSE PRACTITIONER

## 2023-08-23 PROCEDURE — 3017F COLORECTAL CA SCREEN DOC REV: CPT | Performed by: NURSE PRACTITIONER

## 2023-08-23 RX ORDER — ANASTROZOLE 1 MG/1
1 TABLET ORAL DAILY
Qty: 90 TABLET | Refills: 1 | Status: SHIPPED | OUTPATIENT
Start: 2023-08-23

## 2023-08-23 NOTE — PATIENT INSTRUCTIONS
Diagnostic mammogram + U/S right and left breasts    Return to clinic in 3 mos to see Sudheer Obrien

## 2023-08-23 NOTE — PROGRESS NOTES
her radiation treatment. She was placed on adjuvant hormonal therapy with Arimidex in February 2022. Interval History 8/23/2023:   The patient presents to the office today for follow up and evaluation of history of breast cancer. The patient reports she continues on Arimidex and tolerates fine. She reports chronic joint pain with known arthritis and mild hot flashes. She has been trying to lose weight, down 6#s since July. She denies fever/chills, s/s infections, headaches, dizziness, cough, SOB, CP, heart palpitations, abdominal pain, N/V/C/D, peripheral edema/neuropathy, s/s bleeding. She denies changes to her breasts, no palpable lumps/masses, nipple changes/inversion/discharge or edema to chest wall/UE. Hardness noted to inner lower area of bilateral breasts, pt has not noticed it before. Will obtain bilateral diagnostic mammograms + bilateral U/S.      PMH, SH, and FH:  I reviewed the patient's medication and allergy lists as noted on the electronic medical record. The PMH, SH, and FH were also reviewed as noted on the EMR.         Past Medical History:   Diagnosis Date    Arthritis     Borderline hyperlipidemia     Breast cancer ([address]) 01/10/2022    right    Diabetic ulcer of toe of left foot associated with type 2 diabetes mellitus, limited to breakdown of skin ([address]) 08/31/2020    Hypertension     Invasive ductal carcinoma of breast ([address]) 01/2022    right breast    SVT (supraventricular tachycardia) ([address])     Type II or unspecified type diabetes mellitus without mention of complication, not stated as uncontrolled     prediabetes-no meds      Past Surgical History:   Procedure Laterality Date    BREAST LUMPECTOMY Right 01/19/2022    RIGHT LUMPECTOMY WITH PREOP OPERATIVE NEEDLE LOCALIZATION performed by Nestor Rodriguez MD at [address] Left 11/1/2022    Left 5th Hammertoe Correction with Carron Peek Rotational Arthroplasty performed by Flory Tafoya DPM at [address]

## 2023-08-29 ENCOUNTER — HOSPITAL ENCOUNTER (OUTPATIENT)
Dept: WOMENS IMAGING | Age: 73
Discharge: HOME | End: 2023-08-29
Payer: MEDICARE

## 2023-08-29 DIAGNOSIS — N64.51 HARDNESS OF BREAST: ICD-10-CM

## 2023-08-29 DIAGNOSIS — C50.511 MALIGNANT NEOPLASM OF LOWER-OUTER QUADRANT OF RIGHT BREAST OF FEMALE, ESTROGEN RECEPTOR POSITIVE (HCC): ICD-10-CM

## 2023-08-29 DIAGNOSIS — Z17.0 MALIGNANT NEOPLASM OF LOWER-OUTER QUADRANT OF RIGHT BREAST OF FEMALE, ESTROGEN RECEPTOR POSITIVE (HCC): ICD-10-CM

## 2023-08-29 PROCEDURE — 76642 ULTRASOUND BREAST LIMITED: CPT

## 2023-08-29 PROCEDURE — G0279 TOMOSYNTHESIS, MAMMO: HCPCS

## 2023-09-20 ENCOUNTER — OFFICE VISIT (OUTPATIENT)
Dept: FAMILY MEDICINE CLINIC | Age: 73
End: 2023-09-20

## 2023-09-20 ENCOUNTER — HOSPITAL ENCOUNTER (OUTPATIENT)
Age: 73
Discharge: HOME | End: 2023-09-20
Payer: MEDICARE

## 2023-09-20 VITALS
SYSTOLIC BLOOD PRESSURE: 138 MMHG | RESPIRATION RATE: 16 BRPM | WEIGHT: 190 LBS | BODY MASS INDEX: 29.82 KG/M2 | DIASTOLIC BLOOD PRESSURE: 72 MMHG | TEMPERATURE: 97.8 F | HEIGHT: 67 IN | HEART RATE: 56 BPM | OXYGEN SATURATION: 92 %

## 2023-09-20 DIAGNOSIS — E11.9 TYPE 2 DIABETES MELLITUS WITHOUT COMPLICATION, WITHOUT LONG-TERM CURRENT USE OF INSULIN (HCC): Primary | Chronic | ICD-10-CM

## 2023-09-20 DIAGNOSIS — E78.00 PURE HYPERCHOLESTEROLEMIA: Chronic | ICD-10-CM

## 2023-09-20 DIAGNOSIS — I10 ESSENTIAL HYPERTENSION: Chronic | ICD-10-CM

## 2023-09-20 DIAGNOSIS — M19.90 ARTHRITIS: Chronic | ICD-10-CM

## 2023-09-20 DIAGNOSIS — I47.1 SVT (SUPRAVENTRICULAR TACHYCARDIA) (HCC): Chronic | ICD-10-CM

## 2023-09-20 LAB
ANION GAP SERPL CALC-SCNC: 10 MEQ/L (ref 8–16)
BACTERIA URNS QL MICRO: ABNORMAL /HPF
BASOPHILS ABSOLUTE: 0 THOU/MM3 (ref 0–0.1)
BASOPHILS NFR BLD AUTO: 0.4 %
BILIRUB UR QL STRIP.AUTO: NEGATIVE
BUN SERPL-MCNC: 19 MG/DL (ref 7–22)
CALCIUM SERPL-MCNC: 9.7 MG/DL (ref 8.5–10.5)
CASTS #/AREA URNS LPF: ABNORMAL /LPF
CASTS 2: ABNORMAL /LPF
CHARACTER UR: CLEAR
CHLORIDE SERPL-SCNC: 96 MEQ/L (ref 98–111)
CO2 SERPL-SCNC: 28 MEQ/L (ref 23–33)
COLOR: YELLOW
CREAT SERPL-MCNC: 1 MG/DL (ref 0.4–1.2)
CRYSTALS URNS MICRO: ABNORMAL
DEPRECATED RDW RBC AUTO: 41.2 FL (ref 35–45)
EOSINOPHIL NFR BLD AUTO: 2 %
EOSINOPHILS ABSOLUTE: 0.1 THOU/MM3 (ref 0–0.4)
EPITHELIAL CELLS, UA: ABNORMAL /HPF
ERYTHROCYTE [DISTWIDTH] IN BLOOD BY AUTOMATED COUNT: 12.2 % (ref 11.5–14.5)
GFR SERPL CREATININE-BSD FRML MDRD: 60 ML/MIN/1.73M2
GLUCOSE SERPL-MCNC: 134 MG/DL (ref 70–108)
GLUCOSE UR QL STRIP.AUTO: NEGATIVE MG/DL
HCT VFR BLD AUTO: 36.9 % (ref 37–47)
HGB BLD-MCNC: 12.4 GM/DL (ref 12–16)
HGB UR QL STRIP.AUTO: NEGATIVE
IMM GRANULOCYTES # BLD AUTO: 0.01 THOU/MM3 (ref 0–0.07)
IMM GRANULOCYTES NFR BLD AUTO: 0.2 %
KETONES UR QL STRIP.AUTO: NEGATIVE
LYMPHOCYTES ABSOLUTE: 1.3 THOU/MM3 (ref 1–4.8)
LYMPHOCYTES NFR BLD AUTO: 28.2 %
MCH RBC QN AUTO: 31 PG (ref 26–33)
MCHC RBC AUTO-ENTMCNC: 33.6 GM/DL (ref 32.2–35.5)
MCV RBC AUTO: 92.3 FL (ref 81–99)
MISCELLANEOUS 2: ABNORMAL
MONOCYTES ABSOLUTE: 0.3 THOU/MM3 (ref 0.4–1.3)
MONOCYTES NFR BLD AUTO: 6.2 %
MRSA DNA SPEC QL NAA+PROBE: NEGATIVE
NEUTROPHILS NFR BLD AUTO: 63 %
NITRITE UR QL STRIP: NEGATIVE
NRBC BLD AUTO-RTO: 0 /100 WBC
PH UR STRIP.AUTO: 7.5 [PH] (ref 5–9)
PLATELET # BLD AUTO: 222 THOU/MM3 (ref 130–400)
PMV BLD AUTO: 9.4 FL (ref 9.4–12.4)
POTASSIUM SERPL-SCNC: 5 MEQ/L (ref 3.5–5.2)
PROT UR STRIP.AUTO-MCNC: NEGATIVE MG/DL
RBC # BLD AUTO: 4 MILL/MM3 (ref 4.2–5.4)
RBC URINE: ABNORMAL /HPF
RENAL EPI CELLS #/AREA URNS HPF: ABNORMAL /[HPF]
SEGMENTED NEUTROPHILS ABSOLUTE COUNT: 2.8 THOU/MM3 (ref 1.8–7.7)
SODIUM SERPL-SCNC: 134 MEQ/L (ref 135–145)
SP GR UR REFRACT.AUTO: 1.01 (ref 1–1.03)
UROBILINOGEN, URINE: 1 EU/DL (ref 0–1)
WBC # BLD AUTO: 4.5 THOU/MM3 (ref 4.8–10.8)
WBC #/AREA URNS HPF: ABNORMAL /HPF
WBC #/AREA URNS HPF: ABNORMAL /[HPF]
YEAST LIKE FUNGI URNS QL MICRO: ABNORMAL

## 2023-09-20 PROCEDURE — 87641 MR-STAPH DNA AMP PROBE: CPT

## 2023-09-20 PROCEDURE — 85025 COMPLETE CBC W/AUTO DIFF WBC: CPT

## 2023-09-20 PROCEDURE — 87077 CULTURE AEROBIC IDENTIFY: CPT

## 2023-09-20 PROCEDURE — 87186 SC STD MICRODIL/AGAR DIL: CPT

## 2023-09-20 PROCEDURE — 87086 URINE CULTURE/COLONY COUNT: CPT

## 2023-09-20 PROCEDURE — 93010 ELECTROCARDIOGRAM REPORT: CPT | Performed by: NUCLEAR MEDICINE

## 2023-09-20 PROCEDURE — 80048 BASIC METABOLIC PNL TOTAL CA: CPT

## 2023-09-20 PROCEDURE — 36415 COLL VENOUS BLD VENIPUNCTURE: CPT

## 2023-09-20 PROCEDURE — 81001 URINALYSIS AUTO W/SCOPE: CPT

## 2023-09-20 PROCEDURE — 93005 ELECTROCARDIOGRAM TRACING: CPT | Performed by: ORTHOPAEDIC SURGERY

## 2023-09-20 RX ORDER — CELECOXIB 200 MG/1
200 CAPSULE ORAL 2 TIMES DAILY
Qty: 180 CAPSULE | Refills: 3 | Status: CANCELLED | OUTPATIENT
Start: 2023-09-20 | End: 2024-09-19

## 2023-09-20 RX ORDER — ATENOLOL 50 MG/1
50 TABLET ORAL DAILY
Qty: 90 TABLET | Refills: 3 | Status: CANCELLED | OUTPATIENT
Start: 2023-09-20 | End: 2024-09-19

## 2023-09-20 NOTE — PROGRESS NOTES
Larisa Chavez (:  1950) is a 68 y.o. female, Established, here for a shared medical appointment:  Patient Education         ASSESSMENT/PLAN:  1. Type 2 diabetes mellitus without complication, without long-term current use of insulin ([address])  2. Essential hypertension  3. Pure hypercholesterolemia  4. Malignant neoplasm of lower-outer quadrant of right breast of female, estrogen receptor positive ([address])  5. SVT (supraventricular tachycardia) ([address])  6. Arthritis      Patient participated in group visit today. They are an established patient of the practice. Diabetes education provided along with detailed dietary counseling.   -- unraveling the Mediterranean Diet, fiber sources, mono-unsaturated fats vs other fats. Scoring self on the Mediterranean diet and findings one specific change to make  (understanding why eat, macronutrient distribution in our diet and focus on Mediterranean Diet)  (focus this visit: process of lifestyle changes, meal planning and healthy grocery shopping)  (nutrition label reading, adding fiber and reducing sugar intake)    Adequate time and staff were provided during the visit for discussion. Patient was allowed one-to-one time during the visit, if patient requested. Return for November  group visit. Subjective   SUBJECTIVE/OBJECTIVE:  HPI    Patient with DM ii presenting for group visit or shared medical appointment. OA -- celebrex helpful.  will be off for a week due to surgery and will see how helpful. Can take tramadol prior. SVT --  atenolol working well.    HTN well controlled today  Follows with oncology for breast CA management 2022  BP Readings from Last 3 Encounters:   23 138/72   23 (!) 148/64   23 (!) 148/64     Wt Readings from Last 3 Encounters:   23 190 lb (86.2 kg)   23 190 lb 9.6 oz (86.5 kg)   23 193 lb 12.8 oz (87.9 kg)         Lab Results   Component Value Date    LABA1C 7.3 2023    LABA1C 8.2

## 2023-09-21 LAB
BACTERIA UR CULT: ABNORMAL
ORGANISM: ABNORMAL

## 2023-09-23 LAB
EKG ATRIAL RATE: 55 BPM
EKG P AXIS: 57 DEGREES
EKG P-R INTERVAL: 184 MS
EKG Q-T INTERVAL: 422 MS
EKG QRS DURATION: 90 MS
EKG QTC CALCULATION (BAZETT): 403 MS
EKG R AXIS: -7 DEGREES
EKG T AXIS: 46 DEGREES
EKG VENTRICULAR RATE: 55 BPM

## 2023-09-28 ENCOUNTER — TELEPHONE (OUTPATIENT)
Dept: FAMILY MEDICINE CLINIC | Age: 73
End: 2023-09-28

## 2023-09-28 RX ORDER — CIPROFLOXACIN 500 MG/1
500 TABLET, FILM COATED ORAL 2 TIMES DAILY
Qty: 10 TABLET | Refills: 0 | Status: SHIPPED | OUTPATIENT
Start: 2023-09-28 | End: 2023-10-03

## 2023-09-28 NOTE — TELEPHONE ENCOUNTER
[facility] called stating patient is scheduled for procedure next week and urine culture came back positive for E-coli requesting patient be treated prior to surgery.  Pre-op appointment scheduled for tomorrow and results in patients chart

## 2023-09-29 ENCOUNTER — OFFICE VISIT (OUTPATIENT)
Dept: FAMILY MEDICINE CLINIC | Age: 73
End: 2023-09-29

## 2023-09-29 VITALS
HEIGHT: 67 IN | RESPIRATION RATE: 18 BRPM | HEART RATE: 60 BPM | OXYGEN SATURATION: 99 % | TEMPERATURE: 97.1 F | BODY MASS INDEX: 29.57 KG/M2 | DIASTOLIC BLOOD PRESSURE: 98 MMHG | SYSTOLIC BLOOD PRESSURE: 161 MMHG | WEIGHT: 188.4 LBS

## 2023-09-29 DIAGNOSIS — E78.00 PURE HYPERCHOLESTEROLEMIA: Chronic | ICD-10-CM

## 2023-09-29 DIAGNOSIS — M19.011 OSTEOARTHRITIS OF RIGHT SHOULDER, UNSPECIFIED OSTEOARTHRITIS TYPE: ICD-10-CM

## 2023-09-29 DIAGNOSIS — E11.9 TYPE 2 DIABETES MELLITUS WITHOUT COMPLICATION, WITHOUT LONG-TERM CURRENT USE OF INSULIN (HCC): Chronic | ICD-10-CM

## 2023-09-29 DIAGNOSIS — Z17.0 MALIGNANT NEOPLASM OF LOWER-OUTER QUADRANT OF RIGHT BREAST OF FEMALE, ESTROGEN RECEPTOR POSITIVE (HCC): ICD-10-CM

## 2023-09-29 DIAGNOSIS — I10 ESSENTIAL HYPERTENSION: Chronic | ICD-10-CM

## 2023-09-29 DIAGNOSIS — Z01.818 PRE-OP EXAM: Primary | ICD-10-CM

## 2023-09-29 DIAGNOSIS — C50.511 MALIGNANT NEOPLASM OF LOWER-OUTER QUADRANT OF RIGHT BREAST OF FEMALE, ESTROGEN RECEPTOR POSITIVE (HCC): ICD-10-CM

## 2023-09-29 NOTE — PROGRESS NOTES
pre-operative risk assessment guidelines [place] is a low risk for major cardiac complications during a intermediate risk procedure and may continue as planned. Specific medication recommendations are listed below. Medications recommended to continue should be taken with a sip of water even when NPO. Further recommendations from consultants: None  Instructions given and reviewed. Pt advised :  IF THERE IS A  DEVELOPMENT OF AN OPEN WOUND, ABRASION, OR RASH BETWEEN TODAY AND DAY OF SURGERY ON THE OPERATIVE AREA, PT MUST INFORM EITHER THE  SURGEON OR PAT DEPARTMENT. THIS WOULD BE CAUSE FOR CANCELLATION  ======================================================            An electronic signature was used to authenticate this note.     --VESNA Nina - CNP

## 2023-10-30 DIAGNOSIS — F11.90 CHRONIC, CONTINUOUS USE OF OPIOIDS: ICD-10-CM

## 2023-10-30 RX ORDER — TRAMADOL HYDROCHLORIDE 50 MG/1
50 TABLET ORAL EVERY 6 HOURS PRN
Qty: 60 TABLET | Refills: 2 | Status: SHIPPED | OUTPATIENT
Start: 2023-10-30 | End: 2024-01-28

## 2023-10-30 NOTE — TELEPHONE ENCOUNTER
Lavinia Bella called requesting a refill on the following medications:  Requested Prescriptions     Pending Prescriptions Disp Refills    traMADol (ULTRAM) 50 MG tablet [Pharmacy Med Name: TRAMADOL HCL 50 MG TABLET] 60 tablet      Sig: take 1 tablet by mouth every 6 hours if needed for pain TAKE THE LOWEST DOSE POSSIBLE TO MANAGE PAIN MAX DAILY AMOUNT 200MG       Date of last visit: 9/29/2023  Date of next visit (if applicable):1/25/2024  Date of last refill: 4/19/2023  Pharmacy Name: [facility]  [place], [place]

## 2023-11-24 NOTE — PROGRESS NOTES
Lab Results   Component Value Date    ALT 16 05/23/2023    AST 21 05/23/2023    ALKPHOS 96 05/23/2023    BILITOT 0.3 05/23/2023         Assessment and Plan:     1. Malignant neoplasm of lower-outer quadrant of right breast of female, estrogen receptor positive ([address])  S/p right lumpectomy with axillary lymph node biopsy. Since she had early stage breast cancer she did not require systemic chemotherapy. Tumor is smaller than 5 mm in size. Radiation therapy: [facility] 9343 study enrolled patients 79years of age and older. The results showed 10% risk of local disease recurrence at 10 years in women treated with tamoxifen only ( no radiation therapy) in contrast to 2% risk of local disease recurrence at 10 years in women treated with tamoxifen and radiation therapy. However overall survival was the same for both groups. PRIME 2 study, confirmed a modest reduction in recurrence rate with RT that did not translate into a survival benefit. After a median follow-up of five years, ipsilateral breast tumor recurrences were lower in women assigned to RT (1.3 versus 4.1 percent). No differences in overall survival, regional recurrence, distant metastases, contralateral breast cancers, or new breast cancers were noted between the two groups. The patient did to omit postlumpectomy radiation treatment. 2. Prophylactic use of anastrozole (Arimidex)  She continues on Arimidex and tolerates without significant side effects. Continue at current dosing. 3. Encounter for follow-up surveillance of breast cancer  Management of breast cancer survivors who have completed active treatment and have no evidence of disease involve cancer surveillance and lifestyle modifications including pursuing a healthy exercise regimen, minimizing alcohol intake, and refraining from smoking. Survivor follow up includes updated history and regular physical examination.   Radiological imaging to screen for distant recurrence is completed as

## 2023-11-27 ENCOUNTER — HOSPITAL ENCOUNTER (OUTPATIENT)
Dept: INFUSION THERAPY | Age: 73
Discharge: HOME | End: 2023-11-27
Payer: MEDICARE

## 2023-11-27 ENCOUNTER — OFFICE VISIT (OUTPATIENT)
Dept: ONCOLOGY | Age: 73
End: 2023-11-27
Payer: MEDICARE

## 2023-11-27 VITALS
RESPIRATION RATE: 16 BRPM | HEIGHT: 67 IN | SYSTOLIC BLOOD PRESSURE: 186 MMHG | DIASTOLIC BLOOD PRESSURE: 82 MMHG | BODY MASS INDEX: 29.51 KG/M2 | WEIGHT: 188 LBS | TEMPERATURE: 98.2 F

## 2023-11-27 VITALS
OXYGEN SATURATION: 96 % | TEMPERATURE: 98.2 F | RESPIRATION RATE: 16 BRPM | HEART RATE: 79 BPM | SYSTOLIC BLOOD PRESSURE: 186 MMHG | DIASTOLIC BLOOD PRESSURE: 82 MMHG

## 2023-11-27 DIAGNOSIS — Z85.3 ENCOUNTER FOR FOLLOW-UP SURVEILLANCE OF BREAST CANCER: ICD-10-CM

## 2023-11-27 DIAGNOSIS — Z79.811 PROPHYLACTIC USE OF ANASTROZOLE (ARIMIDEX): ICD-10-CM

## 2023-11-27 DIAGNOSIS — Z08 ENCOUNTER FOR FOLLOW-UP SURVEILLANCE OF BREAST CANCER: ICD-10-CM

## 2023-11-27 DIAGNOSIS — C50.511 MALIGNANT NEOPLASM OF LOWER-OUTER QUADRANT OF RIGHT BREAST OF FEMALE, ESTROGEN RECEPTOR POSITIVE (HCC): Primary | ICD-10-CM

## 2023-11-27 DIAGNOSIS — Z17.0 MALIGNANT NEOPLASM OF LOWER-OUTER QUADRANT OF RIGHT BREAST OF FEMALE, ESTROGEN RECEPTOR POSITIVE (HCC): Primary | ICD-10-CM

## 2023-11-27 PROCEDURE — 3017F COLORECTAL CA SCREEN DOC REV: CPT | Performed by: NURSE PRACTITIONER

## 2023-11-27 PROCEDURE — 99211 OFF/OP EST MAY X REQ PHY/QHP: CPT

## 2023-11-27 PROCEDURE — G8427 DOCREV CUR MEDS BY ELIG CLIN: HCPCS | Performed by: NURSE PRACTITIONER

## 2023-11-27 PROCEDURE — G8399 PT W/DXA RESULTS DOCUMENT: HCPCS | Performed by: NURSE PRACTITIONER

## 2023-11-27 PROCEDURE — 3077F SYST BP >= 140 MM HG: CPT | Performed by: NURSE PRACTITIONER

## 2023-11-27 PROCEDURE — 3079F DIAST BP 80-89 MM HG: CPT | Performed by: NURSE PRACTITIONER

## 2023-11-27 PROCEDURE — 1036F TOBACCO NON-USER: CPT | Performed by: NURSE PRACTITIONER

## 2023-11-27 PROCEDURE — 1090F PRES/ABSN URINE INCON ASSESS: CPT | Performed by: NURSE PRACTITIONER

## 2023-11-27 PROCEDURE — G8417 CALC BMI ABV UP PARAM F/U: HCPCS | Performed by: NURSE PRACTITIONER

## 2023-11-27 PROCEDURE — 1123F ACP DISCUSS/DSCN MKR DOCD: CPT | Performed by: NURSE PRACTITIONER

## 2023-11-27 PROCEDURE — 99213 OFFICE O/P EST LOW 20 MIN: CPT | Performed by: NURSE PRACTITIONER

## 2023-11-27 PROCEDURE — G8484 FLU IMMUNIZE NO ADMIN: HCPCS | Performed by: NURSE PRACTITIONER

## 2023-11-27 PROCEDURE — G9899 SCRN MAM PERF RSLTS DOC: HCPCS | Performed by: NURSE PRACTITIONER

## 2024-01-09 DIAGNOSIS — E11.9 TYPE 2 DIABETES MELLITUS WITHOUT COMPLICATION, WITHOUT LONG-TERM CURRENT USE OF INSULIN (HCC): Primary | ICD-10-CM

## 2024-01-09 RX ORDER — LANCETS 30 GAUGE
1 EACH MISCELLANEOUS DAILY
Qty: 100 EACH | Refills: 3 | Status: SHIPPED | OUTPATIENT
Start: 2024-01-09

## 2024-01-09 RX ORDER — GLUCOSAMINE HCL/CHONDROITIN SU 500-400 MG
CAPSULE ORAL
Qty: 100 STRIP | Refills: 3 | Status: SHIPPED | OUTPATIENT
Start: 2024-01-09

## 2024-01-09 NOTE — TELEPHONE ENCOUNTER
Heaven Zavala called requesting a refill on the following medications:  Requested Prescriptions     Pending Prescriptions Disp Refills    blood glucose monitor kit and supplies 1 kit 0     Sig: Dispense sufficient amount for indicated testing frequency plus additional to accommodate PRN testing needs. Dispense all needed supplies to include: monitor, strips, lancing device, lancets, control solutions, alcohol swabs.  *Insurance will cover Accu-chek Guide Blood Glucose Meter    blood glucose monitor strips  0     Sig: Test 1 times a day & as needed for symptoms of irregular blood glucose. Dispense sufficient amount for indicated testing frequency plus additional to accommodate PRN testing needs.    Lancets MISC 300 each 1     Si each by Does not apply route daily       Date of last visit: 2023  Date of next visit (if applicable):2024  Date of last refill:   Pharmacy Name: [facility]  *Patient's pharmacy changed 24    Rx pended      Thanks,  Lillie Nj LPN

## 2024-01-10 DIAGNOSIS — F11.90 CHRONIC, CONTINUOUS USE OF OPIOIDS: ICD-10-CM

## 2024-01-10 DIAGNOSIS — I47.10 SVT (SUPRAVENTRICULAR TACHYCARDIA): Chronic | ICD-10-CM

## 2024-01-10 DIAGNOSIS — E78.00 PURE HYPERCHOLESTEROLEMIA: Chronic | ICD-10-CM

## 2024-01-10 DIAGNOSIS — F51.01 PRIMARY INSOMNIA: ICD-10-CM

## 2024-01-10 RX ORDER — ATENOLOL 50 MG/1
50 TABLET ORAL DAILY
Qty: 90 TABLET | Refills: 0 | Status: SHIPPED | OUTPATIENT
Start: 2024-01-10 | End: 2024-07-07

## 2024-01-10 RX ORDER — TRAMADOL HYDROCHLORIDE 50 MG/1
50 TABLET ORAL EVERY 6 HOURS PRN
Qty: 60 TABLET | Refills: 0 | Status: SHIPPED | OUTPATIENT
Start: 2024-01-10 | End: 2024-04-09

## 2024-01-10 RX ORDER — TRAZODONE HYDROCHLORIDE 50 MG/1
50 TABLET ORAL NIGHTLY PRN
Qty: 90 TABLET | Refills: 0 | Status: SHIPPED | OUTPATIENT
Start: 2024-01-10

## 2024-01-10 RX ORDER — ANASTROZOLE 1 MG/1
1 TABLET ORAL DAILY
Qty: 90 TABLET | Refills: 0 | Status: SHIPPED | OUTPATIENT
Start: 2024-01-10

## 2024-01-10 RX ORDER — SIMVASTATIN 10 MG
10 TABLET ORAL NIGHTLY
Qty: 90 TABLET | Refills: 0 | Status: SHIPPED | OUTPATIENT
Start: 2024-01-10

## 2024-01-10 NOTE — TELEPHONE ENCOUNTER
Received fax from [facility] stating patient would like to receive her Tramadol, Trazodone, simvastatin, atenolol, and anastrozole through them. Please send new scripts to [facility]

## 2024-01-10 NOTE — TELEPHONE ENCOUNTER
Heaven Zavala called requesting a refill on the following medications:  Requested Prescriptions     Pending Prescriptions Disp Refills    traMADol (ULTRAM) 50 MG tablet 60 tablet 2     Sig: Take 1 tablet by mouth every 6 hours as needed for Pain for up to 90 days. Max Daily Amount: 200 mg    atenolol (TENORMIN) 50 MG tablet 90 tablet 0     Sig: Take 1 tablet by mouth daily    anastrozole (ARIMIDEX) 1 MG tablet 90 tablet 1     Sig: Take 1 tablet by mouth daily    simvastatin (ZOCOR) 10 MG tablet 90 tablet 3     Sig: Take 1 tablet by mouth nightly    traZODone (DESYREL) 50 MG tablet 90 tablet 3     Sig: Take 1 tablet by mouth nightly as needed for Sleep       Date of last visit: 9/29/2023  Date of next visit (if applicable):1/25/2024  Pharmacy Name: [facility]       Thanks,  Tory Mcdonough LPN

## 2024-01-17 DIAGNOSIS — E11.9 TYPE 2 DIABETES MELLITUS WITHOUT COMPLICATION, WITHOUT LONG-TERM CURRENT USE OF INSULIN (HCC): ICD-10-CM

## 2024-01-17 DIAGNOSIS — I10 ESSENTIAL HYPERTENSION: ICD-10-CM

## 2024-01-17 DIAGNOSIS — M19.90 ARTHRITIS: Chronic | ICD-10-CM

## 2024-01-17 RX ORDER — LISINOPRIL AND HYDROCHLOROTHIAZIDE 20; 12.5 MG/1; MG/1
1 TABLET ORAL 2 TIMES DAILY
Qty: 180 TABLET | Refills: 0 | Status: SHIPPED | OUTPATIENT
Start: 2024-01-17

## 2024-01-17 RX ORDER — LISINOPRIL AND HYDROCHLOROTHIAZIDE 20; 12.5 MG/1; MG/1
1 TABLET ORAL 2 TIMES DAILY
Qty: 180 TABLET | Refills: 0 | Status: SHIPPED | OUTPATIENT
Start: 2024-01-17 | End: 2024-01-17

## 2024-01-17 RX ORDER — CELECOXIB 200 MG/1
200 CAPSULE ORAL 2 TIMES DAILY
Qty: 180 CAPSULE | Refills: 0 | Status: SHIPPED | OUTPATIENT
Start: 2024-01-17 | End: 2024-01-17

## 2024-01-17 RX ORDER — CELECOXIB 200 MG/1
200 CAPSULE ORAL 2 TIMES DAILY
Qty: 180 CAPSULE | Refills: 0 | Status: SHIPPED | OUTPATIENT
Start: 2024-01-17 | End: 2024-07-14

## 2024-01-17 NOTE — TELEPHONE ENCOUNTER
Initially sent to [employer] [facility]. Then cancelled and changed pharmacy to [facility].  Please cancel [facility] scripts. Thank you.

## 2024-01-17 NOTE — TELEPHONE ENCOUNTER
Heaven Zavala called requesting a refill on the following medications:  Requested Prescriptions     Pending Prescriptions Disp Refills    celecoxib (CELEBREX) 200 MG capsule 180 capsule 3     Sig: Take 1 capsule by mouth 2 times daily    metFORMIN (GLUCOPHAGE) 500 MG tablet 180 tablet 1     Sig: Take 1 tablet by mouth 2 times daily (with meals)    lisinopril-hydroCHLOROthiazide (PRINZIDE;ZESTORETIC) 20-12.5 MG per tablet 180 tablet 3     Sig: Take 1 tablet by mouth 2 times daily       Date of last visit: 9/29/2023  Date of next visit (if applicable):1/25/2024  Date of last refill: 01/12/2023  Pharmacy Name: [facility]      ThanksMirta MA    [facility] pharmacy called asking for 90 day supplies on all three meds.

## 2024-01-18 ENCOUNTER — HOSPITAL ENCOUNTER (OUTPATIENT)
Age: 74
Discharge: HOME | End: 2024-01-18
Payer: MEDICARE

## 2024-01-18 DIAGNOSIS — I10 ESSENTIAL HYPERTENSION: Chronic | ICD-10-CM

## 2024-01-18 DIAGNOSIS — E11.9 TYPE 2 DIABETES MELLITUS WITHOUT COMPLICATION, WITHOUT LONG-TERM CURRENT USE OF INSULIN (HCC): ICD-10-CM

## 2024-01-18 DIAGNOSIS — E78.00 PURE HYPERCHOLESTEROLEMIA: Chronic | ICD-10-CM

## 2024-01-18 LAB
ALBUMIN SERPL BCG-MCNC: 4.5 G/DL (ref 3.5–5.1)
ALP SERPL-CCNC: 109 U/L (ref 38–126)
ALT SERPL W/O P-5'-P-CCNC: 14 U/L (ref 11–66)
ANION GAP SERPL CALC-SCNC: 12 MEQ/L (ref 8–16)
AST SERPL-CCNC: 17 U/L (ref 5–40)
BASOPHILS ABSOLUTE: 0 THOU/MM3 (ref 0–0.1)
BASOPHILS NFR BLD AUTO: 0.3 %
BILIRUB SERPL-MCNC: 0.4 MG/DL (ref 0.3–1.2)
BUN SERPL-MCNC: 24 MG/DL (ref 7–22)
CALCIUM SERPL-MCNC: 9.6 MG/DL (ref 8.5–10.5)
CHLORIDE SERPL-SCNC: 100 MEQ/L (ref 98–111)
CHOLEST SERPL-MCNC: 162 MG/DL (ref 100–199)
CO2 SERPL-SCNC: 27 MEQ/L (ref 23–33)
CREAT SERPL-MCNC: 0.9 MG/DL (ref 0.4–1.2)
DEPRECATED MEAN GLUCOSE BLD GHB EST-ACNC: 150 MG/DL (ref 70–126)
DEPRECATED RDW RBC AUTO: 43 FL (ref 35–45)
EOSINOPHIL NFR BLD AUTO: 3.4 %
EOSINOPHILS ABSOLUTE: 0.1 THOU/MM3 (ref 0–0.4)
ERYTHROCYTE [DISTWIDTH] IN BLOOD BY AUTOMATED COUNT: 12.6 % (ref 11.5–14.5)
GFR SERPL CREATININE-BSD FRML MDRD: > 60 ML/MIN/1.73M2
GLUCOSE SERPL-MCNC: 170 MG/DL (ref 70–108)
HBA1C MFR BLD HPLC: 7 % (ref 4.4–6.4)
HCT VFR BLD AUTO: 39.6 % (ref 37–47)
HDLC SERPL-MCNC: 49 MG/DL
HGB BLD-MCNC: 13 GM/DL (ref 12–16)
IMM GRANULOCYTES # BLD AUTO: 0.01 THOU/MM3 (ref 0–0.07)
IMM GRANULOCYTES NFR BLD AUTO: 0.3 %
LDLC SERPL CALC-MCNC: 96 MG/DL
LYMPHOCYTES ABSOLUTE: 1.2 THOU/MM3 (ref 1–4.8)
LYMPHOCYTES NFR BLD AUTO: 33.5 %
MCH RBC QN AUTO: 30.2 PG (ref 26–33)
MCHC RBC AUTO-ENTMCNC: 32.8 GM/DL (ref 32.2–35.5)
MCV RBC AUTO: 91.9 FL (ref 81–99)
MONOCYTES ABSOLUTE: 0.2 THOU/MM3 (ref 0.4–1.3)
MONOCYTES NFR BLD AUTO: 5.4 %
NEUTROPHILS NFR BLD AUTO: 57.1 %
NRBC BLD AUTO-RTO: 0 /100 WBC
PLATELET # BLD AUTO: 208 THOU/MM3 (ref 130–400)
PMV BLD AUTO: 9.5 FL (ref 9.4–12.4)
POTASSIUM SERPL-SCNC: 4.3 MEQ/L (ref 3.5–5.2)
PROT SERPL-MCNC: 6.7 G/DL (ref 6.1–8)
RBC # BLD AUTO: 4.31 MILL/MM3 (ref 4.2–5.4)
SEGMENTED NEUTROPHILS ABSOLUTE COUNT: 2 THOU/MM3 (ref 1.8–7.7)
SODIUM SERPL-SCNC: 139 MEQ/L (ref 135–145)
TRIGL SERPL-MCNC: 85 MG/DL (ref 0–199)
TSH SERPL DL<=0.005 MIU/L-ACNC: 3.22 UIU/ML (ref 0.4–4.2)
VIT B12 SERPL-MCNC: 396 PG/ML (ref 211–911)
WBC # BLD AUTO: 3.5 THOU/MM3 (ref 4.8–10.8)

## 2024-01-18 PROCEDURE — 82607 VITAMIN B-12: CPT

## 2024-01-18 PROCEDURE — 85025 COMPLETE CBC W/AUTO DIFF WBC: CPT

## 2024-01-18 PROCEDURE — 80053 COMPREHEN METABOLIC PANEL: CPT

## 2024-01-18 PROCEDURE — 80061 LIPID PANEL: CPT

## 2024-01-18 PROCEDURE — 36415 COLL VENOUS BLD VENIPUNCTURE: CPT

## 2024-01-18 PROCEDURE — 84443 ASSAY THYROID STIM HORMONE: CPT

## 2024-01-18 PROCEDURE — 83036 HEMOGLOBIN GLYCOSYLATED A1C: CPT

## 2024-01-25 ENCOUNTER — OFFICE VISIT (OUTPATIENT)
Dept: FAMILY MEDICINE CLINIC | Age: 74
End: 2024-01-25

## 2024-01-25 ENCOUNTER — OFFICE VISIT (OUTPATIENT)
Dept: SURGERY | Age: 74
End: 2024-01-25
Payer: MEDICARE

## 2024-01-25 VITALS
OXYGEN SATURATION: 100 % | SYSTOLIC BLOOD PRESSURE: 120 MMHG | HEART RATE: 65 BPM | DIASTOLIC BLOOD PRESSURE: 62 MMHG | HEIGHT: 67 IN | RESPIRATION RATE: 16 BRPM | TEMPERATURE: 97.4 F | WEIGHT: 191.4 LBS | BODY MASS INDEX: 30.04 KG/M2

## 2024-01-25 VITALS
WEIGHT: 191.2 LBS | SYSTOLIC BLOOD PRESSURE: 132 MMHG | DIASTOLIC BLOOD PRESSURE: 84 MMHG | BODY MASS INDEX: 30.01 KG/M2 | HEART RATE: 59 BPM | OXYGEN SATURATION: 95 % | RESPIRATION RATE: 16 BRPM | HEIGHT: 67 IN | TEMPERATURE: 97.1 F

## 2024-01-25 DIAGNOSIS — C50.511 MALIGNANT NEOPLASM OF LOWER-OUTER QUADRANT OF RIGHT BREAST OF FEMALE, ESTROGEN RECEPTOR POSITIVE (HCC): Primary | ICD-10-CM

## 2024-01-25 DIAGNOSIS — M19.90 ARTHRITIS: Chronic | ICD-10-CM

## 2024-01-25 DIAGNOSIS — E11.9 TYPE 2 DIABETES MELLITUS WITHOUT COMPLICATION, WITHOUT LONG-TERM CURRENT USE OF INSULIN (HCC): Primary | Chronic | ICD-10-CM

## 2024-01-25 DIAGNOSIS — F51.01 PRIMARY INSOMNIA: ICD-10-CM

## 2024-01-25 DIAGNOSIS — F11.90 CHRONIC, CONTINUOUS USE OF OPIOIDS: ICD-10-CM

## 2024-01-25 DIAGNOSIS — E78.00 PURE HYPERCHOLESTEROLEMIA: Chronic | ICD-10-CM

## 2024-01-25 DIAGNOSIS — I10 ESSENTIAL HYPERTENSION: Chronic | ICD-10-CM

## 2024-01-25 DIAGNOSIS — Z17.0 MALIGNANT NEOPLASM OF LOWER-OUTER QUADRANT OF RIGHT BREAST OF FEMALE, ESTROGEN RECEPTOR POSITIVE (HCC): Primary | ICD-10-CM

## 2024-01-25 DIAGNOSIS — I47.10 SVT (SUPRAVENTRICULAR TACHYCARDIA): Chronic | ICD-10-CM

## 2024-01-25 PROCEDURE — 3078F DIAST BP <80 MM HG: CPT | Performed by: SURGERY

## 2024-01-25 PROCEDURE — G8427 DOCREV CUR MEDS BY ELIG CLIN: HCPCS | Performed by: SURGERY

## 2024-01-25 PROCEDURE — G8399 PT W/DXA RESULTS DOCUMENT: HCPCS | Performed by: SURGERY

## 2024-01-25 PROCEDURE — 99213 OFFICE O/P EST LOW 20 MIN: CPT | Performed by: SURGERY

## 2024-01-25 PROCEDURE — G8417 CALC BMI ABV UP PARAM F/U: HCPCS | Performed by: SURGERY

## 2024-01-25 PROCEDURE — 1036F TOBACCO NON-USER: CPT | Performed by: SURGERY

## 2024-01-25 PROCEDURE — 1123F ACP DISCUSS/DSCN MKR DOCD: CPT | Performed by: SURGERY

## 2024-01-25 PROCEDURE — G8484 FLU IMMUNIZE NO ADMIN: HCPCS | Performed by: SURGERY

## 2024-01-25 PROCEDURE — 3017F COLORECTAL CA SCREEN DOC REV: CPT | Performed by: SURGERY

## 2024-01-25 PROCEDURE — 3074F SYST BP LT 130 MM HG: CPT | Performed by: SURGERY

## 2024-01-25 PROCEDURE — 1090F PRES/ABSN URINE INCON ASSESS: CPT | Performed by: SURGERY

## 2024-01-25 ASSESSMENT — PATIENT HEALTH QUESTIONNAIRE - PHQ9
1. LITTLE INTEREST OR PLEASURE IN DOING THINGS: 0
SUM OF ALL RESPONSES TO PHQ QUESTIONS 1-9: 0
2. FEELING DOWN, DEPRESSED OR HOPELESS: 0
SUM OF ALL RESPONSES TO PHQ9 QUESTIONS 1 & 2: 0
SUM OF ALL RESPONSES TO PHQ QUESTIONS 1-9: 0

## 2024-01-25 NOTE — PROGRESS NOTES
MD BENITA [facility] DR GENERAL SURGERY  General Surgery  Clinic Post op Note    Pt Name: Heaven Zavala  Medical Record Number: [id number]  Date of Birth 1950   Today's Date: 1/25/2024    ASSESSMENT       ICD-10-CM    1. Malignant neoplasm of lower-outer quadrant of right breast of female, estrogen receptor positive (HCC)  C50.511     Z17.0            PLAN   Continue Arimidex therapy, patient is tolerating  Continue routine mammograms, I will see her back in 6 months time with repeat imaging.  Patient instructed to call with any concerns.  SUBJECTIVE   Heaven is doing well, no significant issues.  No nipple discharge no drainage no tenderness.  Her primary provider thought that maybe she felt a variation in her breast tissue, there is nothing was visualized on mammogram..     Patient had screening mammogram on January 5, 2022 that showed 5 mm oval density in the central right breast, middle depth. Spot compression views, and LM view and an ultrasound were performed and confirmed the presence of spiculated 4 x 3 x 5 mm hypoechoic mass.  Subsequently on January 10, 2022 the patient underwent mass biopsy that revealed well differentiated invasive ductal carcinoma, Derby score 1 of 3. Estrogen Receptor: Positive 95% of cells, Progesterone Receptor: Positive 100% of cells, Ki-67 Percentage of positive nuclei:  15%    HER2 MALAIKA NEGATIVE       right breast lumpectomy.  Due to her age of 71 is a sentinel lymph node biopsy was omitted, since clinically she had a negative axillary lymph nodes.  She had surgery on January 19, 2022, final pathology report showed:  A. Right breast mass, excision:    Invasive ductal carcinoma, Derby grade 1.    Margins free of carcinoma.    Pathologic stage: pT1a.     B. Breast, additional caudal margins, excision:    Negative for malignancy.       Fibrocystic changes.       Incidental intraductal papilloma, excised.     The patient decided to omit her radiation

## 2024-01-25 NOTE — PROGRESS NOTES
Heaven Zavala (:  1950) is a 73 y.o. female,Established patient, here for evaluation of the following chief complaint(s):  Diabetes (No issues/concerns)         ASSESSMENT/PLAN:  1. Type 2 diabetes mellitus without complication, without long-term current use of insulin (HCC)  -     lisinopril-hydroCHLOROthiazide (PRINZIDE;ZESTORETIC) 20-12.5 MG per tablet; Take 1 tablet by mouth 2 times daily, Disp-180 tablet, R-3Normal  -     metFORMIN (GLUCOPHAGE) 500 MG tablet; Take 1 tablet by mouth 2 times daily (with meals), Disp-180 tablet, R-3Normal  2. Essential hypertension  -     lisinopril-hydroCHLOROthiazide (PRINZIDE;ZESTORETIC) 20-12.5 MG per tablet; Take 1 tablet by mouth 2 times daily, Disp-180 tablet, R-3Normal  3. Primary insomnia  -     traZODone (DESYREL) 50 MG tablet; Take 1 tablet by mouth nightly as needed for Sleep, Disp-90 tablet, R-3Normal  4. Chronic, continuous use of opioids  -     traMADol (ULTRAM) 50 MG tablet; Take 1 tablet by mouth every 6 hours as needed for Pain for up to 90 days. Max Daily Amount: 200 mg, Disp-60 tablet, R-2Normal  5. Arthritis  -     celecoxib (CELEBREX) 200 MG capsule; Take 1 capsule by mouth 2 times daily, Disp-180 capsule, R-1Normal  6. SVT (supraventricular tachycardia)  -     atenolol (TENORMIN) 50 MG tablet; Take 1 tablet by mouth daily, Disp-90 tablet, R-3Normal  7. Pure hypercholesterolemia  -     simvastatin (ZOCOR) 10 MG tablet; Take 1 tablet by mouth nightly, Disp-90 tablet, R-3Normal  She is benefiting from more activity  Diet changes are helping as well  Functioning with tramadol. Continue monitoring. Doing well    Return in about 6 months (around 2024) for AWV with POCT A1c.         Subjective   SUBJECTIVE/OBJECTIVE:  HPI    Working on right shoulder ROM and strength  Couldn't cook.  Had meals brought in for her after surgery.   Water and when goes out she will splurge  DM II -- -150  More active lately now as well.   She is surprised

## 2024-01-27 RX ORDER — SIMVASTATIN 10 MG
10 TABLET ORAL NIGHTLY
Qty: 90 TABLET | Refills: 3 | Status: SHIPPED | OUTPATIENT
Start: 2024-01-27

## 2024-01-27 RX ORDER — ATENOLOL 50 MG/1
50 TABLET ORAL DAILY
Qty: 90 TABLET | Refills: 3 | Status: SHIPPED | OUTPATIENT
Start: 2024-01-27 | End: 2025-01-26

## 2024-01-27 RX ORDER — CELECOXIB 200 MG/1
200 CAPSULE ORAL 2 TIMES DAILY
Qty: 180 CAPSULE | Refills: 1 | Status: SHIPPED | OUTPATIENT
Start: 2024-01-27 | End: 2024-07-25

## 2024-01-27 RX ORDER — LISINOPRIL AND HYDROCHLOROTHIAZIDE 20; 12.5 MG/1; MG/1
1 TABLET ORAL 2 TIMES DAILY
Qty: 180 TABLET | Refills: 3 | Status: SHIPPED | OUTPATIENT
Start: 2024-01-27

## 2024-01-27 RX ORDER — TRAZODONE HYDROCHLORIDE 50 MG/1
50 TABLET ORAL NIGHTLY PRN
Qty: 90 TABLET | Refills: 3 | Status: SHIPPED | OUTPATIENT
Start: 2024-01-27

## 2024-01-27 RX ORDER — ANASTROZOLE 1 MG/1
1 TABLET ORAL DAILY
Qty: 90 TABLET | Refills: 3 | Status: SHIPPED | OUTPATIENT
Start: 2024-01-27

## 2024-01-27 RX ORDER — TRAMADOL HYDROCHLORIDE 50 MG/1
50 TABLET ORAL EVERY 6 HOURS PRN
Qty: 60 TABLET | Refills: 2 | Status: SHIPPED | OUTPATIENT
Start: 2024-01-27 | End: 2024-04-26

## 2024-01-27 ASSESSMENT — ENCOUNTER SYMPTOMS: SHORTNESS OF BREATH: 0

## 2024-02-05 ASSESSMENT — ENCOUNTER SYMPTOMS: GASTROINTESTINAL NEGATIVE: 1

## 2024-02-26 NOTE — PROGRESS NOTES
[facility] [address], [address]  [facility] [address]  Dept: [phone]  Dept Fax: [phone] Loc: [phone]      Visit Date:2/27/2024     Heaven Zavala is a 73 y.o. female who presents today for:   Chief Complaint   Patient presents with    Follow-up     Malignant neoplasm of lower-outer quadrant of right breast of female, estrogen receptor positive (HCC)        HPI:   Heaven Zavala is a 73 y.o. female who follows in our office with history of breast cancer.  HPI per prior note in our office:  Patient had screening mammogram on January 5, 2022 that showed 5 mm oval density in the central right breast, middle depth. Spot compression views, and LM view and an ultrasound were performed and confirmed the presence of spiculated 4 x 3 x 5 mm hypoechoic mass.  Subsequently on January 10, 2022 the patient underwent mass biopsy that revealed well differentiated invasive ductal carcinoma, [place] score 1 of 3. Estrogen Receptor: Positive 95% of cells, Progesterone Receptor: Positive 100% of cells, Ki-67 Percentage of positive nuclei:  15%    HER2 MALAIKA NEGATIVE   She met with the surgeon Dr. Ana Arias and after discussing her surgical options she decided to proceed with right breast lumpectomy.  Due to her age of 71 is a sentinel lymph node biopsy was omitted, since clinically she had a negative axillary lymph nodes.  She had surgery on January 19, 2022, final pathology report showed:  A. Right breast mass, excision:    Invasive ductal carcinoma, Livia grade 1.    Margins free of carcinoma.    Pathologic stage: pT1a.     B. Breast, additional caudal margins, excision:    Negative for malignancy.       Fibrocystic changes.       Incidental intraductal papilloma, excised.         Risk assessment:   Onset of menses at at 14  Onset of menopause at 50ish  Fist child at age of 23  No hormonal therapy      Her postsurgical course was unremarkable  The patient decided to

## 2024-02-27 ENCOUNTER — OFFICE VISIT (OUTPATIENT)
Dept: ONCOLOGY | Age: 74
End: 2024-02-27
Payer: MEDICARE

## 2024-02-27 ENCOUNTER — HOSPITAL ENCOUNTER (OUTPATIENT)
Dept: INFUSION THERAPY | Age: 74
Discharge: HOME | End: 2024-02-27
Payer: MEDICARE

## 2024-02-27 VITALS
RESPIRATION RATE: 16 BRPM | TEMPERATURE: 98 F | DIASTOLIC BLOOD PRESSURE: 70 MMHG | SYSTOLIC BLOOD PRESSURE: 160 MMHG | HEART RATE: 65 BPM | OXYGEN SATURATION: 100 %

## 2024-02-27 VITALS
SYSTOLIC BLOOD PRESSURE: 160 MMHG | HEIGHT: 67 IN | HEART RATE: 65 BPM | BODY MASS INDEX: 29.82 KG/M2 | DIASTOLIC BLOOD PRESSURE: 70 MMHG | WEIGHT: 190 LBS | RESPIRATION RATE: 16 BRPM | TEMPERATURE: 98 F | OXYGEN SATURATION: 100 %

## 2024-02-27 DIAGNOSIS — Z79.811 PROPHYLACTIC USE OF ANASTROZOLE (ARIMIDEX): ICD-10-CM

## 2024-02-27 DIAGNOSIS — Z12.31 ENCOUNTER FOR SCREENING MAMMOGRAM FOR BREAST CANCER: Primary | ICD-10-CM

## 2024-02-27 DIAGNOSIS — Z85.3 ENCOUNTER FOR FOLLOW-UP SURVEILLANCE OF BREAST CANCER: ICD-10-CM

## 2024-02-27 DIAGNOSIS — Z17.0 MALIGNANT NEOPLASM OF LOWER-OUTER QUADRANT OF RIGHT BREAST OF FEMALE, ESTROGEN RECEPTOR POSITIVE (HCC): ICD-10-CM

## 2024-02-27 DIAGNOSIS — Z08 ENCOUNTER FOR FOLLOW-UP SURVEILLANCE OF BREAST CANCER: ICD-10-CM

## 2024-02-27 DIAGNOSIS — C50.511 MALIGNANT NEOPLASM OF LOWER-OUTER QUADRANT OF RIGHT BREAST OF FEMALE, ESTROGEN RECEPTOR POSITIVE (HCC): ICD-10-CM

## 2024-02-27 PROCEDURE — 1090F PRES/ABSN URINE INCON ASSESS: CPT | Performed by: NURSE PRACTITIONER

## 2024-02-27 PROCEDURE — G8417 CALC BMI ABV UP PARAM F/U: HCPCS | Performed by: NURSE PRACTITIONER

## 2024-02-27 PROCEDURE — G8399 PT W/DXA RESULTS DOCUMENT: HCPCS | Performed by: NURSE PRACTITIONER

## 2024-02-27 PROCEDURE — 3017F COLORECTAL CA SCREEN DOC REV: CPT | Performed by: NURSE PRACTITIONER

## 2024-02-27 PROCEDURE — 99211 OFF/OP EST MAY X REQ PHY/QHP: CPT

## 2024-02-27 PROCEDURE — 1123F ACP DISCUSS/DSCN MKR DOCD: CPT | Performed by: NURSE PRACTITIONER

## 2024-02-27 PROCEDURE — 3077F SYST BP >= 140 MM HG: CPT | Performed by: NURSE PRACTITIONER

## 2024-02-27 PROCEDURE — 3078F DIAST BP <80 MM HG: CPT | Performed by: NURSE PRACTITIONER

## 2024-02-27 PROCEDURE — G8427 DOCREV CUR MEDS BY ELIG CLIN: HCPCS | Performed by: NURSE PRACTITIONER

## 2024-02-27 PROCEDURE — 99213 OFFICE O/P EST LOW 20 MIN: CPT | Performed by: NURSE PRACTITIONER

## 2024-02-27 PROCEDURE — G8484 FLU IMMUNIZE NO ADMIN: HCPCS | Performed by: NURSE PRACTITIONER

## 2024-02-27 PROCEDURE — 1036F TOBACCO NON-USER: CPT | Performed by: NURSE PRACTITIONER

## 2024-02-27 NOTE — PATIENT INSTRUCTIONS
Mammogram due after 8/29/2024-please schedule   Return to clinic in 6 mos, after mammogram, to see Gloria   Continue Arimidex

## 2024-04-24 NOTE — PROGRESS NOTES
[address] CANCER [address] [place] [address]  Dept: [phone]  Loc: [address] [place] [address]  1950   No ref. provider found   Emely Collins MD       Fredy Hernadez is a 68 y.o.  female with Estrogen receptor positive breast cancer. CHIEF COMPLAINT  Chief Complaint   Patient presents with    Follow-up     Malignant neoplasm of lower-outer quadrant of right breast of female, estrogen receptor positive           HISTORY OF PRESENT ILLNESS    1/5/2022. Screening mammogram showed 5 mm oval density in the central right breast in the middle depth. Additional views confirmed this lesion. On ultrasound it measured 4 x 3 x 5 mm and was hypoechoic. 1/10/2022. Biopsy showed well-differentiated invasive ductal carcinoma, grade 1, ER +95% of cells, WA +100% of cells, Ki-67 15% HER2 negative. She had had menarche at age 16 and menopause around age 49. She had her first child age 26. She had no previous hormonal therapy. 1/14/2022. Referred to Dr. Lyssa Flores to discuss surgical options. 1/19/2022. Right breast lumpectomy without sentinel node biopsy due to age being 80. Margins were free. This size of the invasive focus was 5 mm. Lymphovascular invasion was not identified. 2/9/2022. Referred to Dr. Arminda Fulton documented that the patient did not require systemic chemotherapy nor did she require an Oncotype DX assay. Radiation therapy and aromatase inhibitor therapy were discussed. DEXA Scan was previously done on 1/22/2021 normal bone mineral density. 3/14/2022. Consultation with Dr. Lynette Hammond which time admission of radiation therapy was discussed as long as she was adherent to her antiestrogen therapy. She was started on her aromatase inhibitor in February to March of this year. 6/9/2022. Survivorship visit with [facility], doing well.    11/22/2022.   Follow-up with [facility] and imaging were obtained.  Patient's symptoms were treated.  Patient with normal hemoglobin, normal white blood cell count, normal electrolytes and LFTs, CT scan without evidence of acute findings.  Results were discussed with patient as noted.  Patient will need outpatient follow-up with GI.  Patient to return if any worsening symptoms or concerns

## 2024-05-23 DIAGNOSIS — F11.90 CHRONIC, CONTINUOUS USE OF OPIOIDS: ICD-10-CM

## 2024-05-23 RX ORDER — TRAMADOL HYDROCHLORIDE 50 MG/1
50 TABLET ORAL EVERY 6 HOURS PRN
Qty: 60 TABLET | Refills: 1 | Status: SHIPPED | OUTPATIENT
Start: 2024-05-23 | End: 2024-08-21

## 2024-05-23 NOTE — TELEPHONE ENCOUNTER
Heaven Zavala called requesting a refill on the following medications:  Requested Prescriptions     Pending Prescriptions Disp Refills    traMADol (ULTRAM) 50 MG tablet 60 tablet 2     Sig: Take 1 tablet by mouth every 6 hours as needed for Pain for up to 90 days. Max Daily Amount: 200 mg       Date of last visit: 1/25/2024  Date of next visit (if applicable):7/25/2024  Date of last refill: 1/27/24  Pharmacy Name: [facility] L[facility]

## 2024-07-22 DIAGNOSIS — F11.90 CHRONIC, CONTINUOUS USE OF OPIOIDS: ICD-10-CM

## 2024-07-22 RX ORDER — TRAMADOL HYDROCHLORIDE 50 MG/1
50 TABLET ORAL EVERY 6 HOURS PRN
Qty: 60 TABLET | Refills: 0 | Status: SHIPPED | OUTPATIENT
Start: 2024-07-22 | End: 2024-10-20

## 2024-07-22 SDOH — ECONOMIC STABILITY: FOOD INSECURITY: WITHIN THE PAST 12 MONTHS, YOU WORRIED THAT YOUR FOOD WOULD RUN OUT BEFORE YOU GOT MONEY TO BUY MORE.: NEVER TRUE

## 2024-07-22 SDOH — ECONOMIC STABILITY: INCOME INSECURITY: HOW HARD IS IT FOR YOU TO PAY FOR THE VERY BASICS LIKE FOOD, HOUSING, MEDICAL CARE, AND HEATING?: NOT HARD AT ALL

## 2024-07-22 SDOH — ECONOMIC STABILITY: FOOD INSECURITY: WITHIN THE PAST 12 MONTHS, THE FOOD YOU BOUGHT JUST DIDN'T LAST AND YOU DIDN'T HAVE MONEY TO GET MORE.: NEVER TRUE

## 2024-07-22 SDOH — HEALTH STABILITY: PHYSICAL HEALTH: ON AVERAGE, HOW MANY MINUTES DO YOU ENGAGE IN EXERCISE AT THIS LEVEL?: 50 MIN

## 2024-07-22 ASSESSMENT — LIFESTYLE VARIABLES
HOW OFTEN DO YOU HAVE SIX OR MORE DRINKS ON ONE OCCASION: 1
HOW OFTEN DO YOU HAVE A DRINK CONTAINING ALCOHOL: NEVER
HOW MANY STANDARD DRINKS CONTAINING ALCOHOL DO YOU HAVE ON A TYPICAL DAY: PATIENT DOES NOT DRINK
HOW MANY STANDARD DRINKS CONTAINING ALCOHOL DO YOU HAVE ON A TYPICAL DAY: 0
HOW OFTEN DO YOU HAVE A DRINK CONTAINING ALCOHOL: 1

## 2024-07-22 ASSESSMENT — PATIENT HEALTH QUESTIONNAIRE - PHQ9
SUM OF ALL RESPONSES TO PHQ9 QUESTIONS 1 & 2: 0
SUM OF ALL RESPONSES TO PHQ QUESTIONS 1-9: 0
SUM OF ALL RESPONSES TO PHQ QUESTIONS 1-9: 0
1. LITTLE INTEREST OR PLEASURE IN DOING THINGS: NOT AT ALL
SUM OF ALL RESPONSES TO PHQ QUESTIONS 1-9: 0
SUM OF ALL RESPONSES TO PHQ QUESTIONS 1-9: 0
2. FEELING DOWN, DEPRESSED OR HOPELESS: NOT AT ALL

## 2024-07-22 NOTE — TELEPHONE ENCOUNTER
Patient has over a week of medication left. She is only concerned because she uses mail order and it takes time for it to get here and she doesn't want to run out.

## 2024-07-22 NOTE — TELEPHONE ENCOUNTER
Heaven Zavala called requesting a refill on the following medications:  Requested Prescriptions     Pending Prescriptions Disp Refills    traMADol (ULTRAM) 50 MG tablet 60 tablet 1     Sig: Take 1 tablet by mouth every 6 hours as needed for Pain for up to 90 days. Max Daily Amount: 200 mg       Date of last visit: 1/25/2024  Date of next visit (if applicable):7/25/2024  Date of last refill: 5/23/24  Pharmacy Name: [facility]

## 2024-07-23 ENCOUNTER — HOSPITAL ENCOUNTER (OUTPATIENT)
Age: 74
Discharge: HOME | End: 2024-07-23
Payer: MEDICARE

## 2024-07-23 DIAGNOSIS — E11.9 TYPE 2 DIABETES MELLITUS WITHOUT COMPLICATION, WITHOUT LONG-TERM CURRENT USE OF INSULIN (HCC): ICD-10-CM

## 2024-07-23 DIAGNOSIS — I10 ESSENTIAL HYPERTENSION: Chronic | ICD-10-CM

## 2024-07-23 DIAGNOSIS — E78.00 PURE HYPERCHOLESTEROLEMIA: Chronic | ICD-10-CM

## 2024-07-23 LAB
CREAT UR-MCNC: 95.9 MG/DL
MICROALBUMIN UR-MCNC: < 1.2 MG/DL
MICROALBUMIN/CREAT RATIO PNL UR: 13 MG/G (ref 0–30)

## 2024-07-23 PROCEDURE — 82043 UR ALBUMIN QUANTITATIVE: CPT

## 2024-07-25 ENCOUNTER — OFFICE VISIT (OUTPATIENT)
Dept: FAMILY MEDICINE CLINIC | Age: 74
End: 2024-07-25
Payer: MEDICARE

## 2024-07-25 VITALS
HEART RATE: 59 BPM | WEIGHT: 190 LBS | BODY MASS INDEX: 29.82 KG/M2 | SYSTOLIC BLOOD PRESSURE: 134 MMHG | HEIGHT: 67 IN | OXYGEN SATURATION: 98 % | RESPIRATION RATE: 18 BRPM | DIASTOLIC BLOOD PRESSURE: 80 MMHG

## 2024-07-25 DIAGNOSIS — M54.2 NECK PAIN: ICD-10-CM

## 2024-07-25 DIAGNOSIS — M19.049 HAND ARTHRITIS: ICD-10-CM

## 2024-07-25 DIAGNOSIS — M40.203 KYPHOSIS OF CERVICOTHORACIC REGION, UNSPECIFIED KYPHOSIS TYPE: ICD-10-CM

## 2024-07-25 DIAGNOSIS — E11.9 TYPE 2 DIABETES MELLITUS WITHOUT COMPLICATION, WITHOUT LONG-TERM CURRENT USE OF INSULIN (HCC): Chronic | ICD-10-CM

## 2024-07-25 DIAGNOSIS — Z00.00 MEDICARE ANNUAL WELLNESS VISIT, SUBSEQUENT: Primary | ICD-10-CM

## 2024-07-25 DIAGNOSIS — M54.16 LUMBAR BACK PAIN WITH RADICULOPATHY AFFECTING LOWER EXTREMITY: ICD-10-CM

## 2024-07-25 DIAGNOSIS — M19.90 ARTHRITIS: Chronic | ICD-10-CM

## 2024-07-25 LAB — HBA1C MFR BLD: 7 %

## 2024-07-25 PROCEDURE — 3075F SYST BP GE 130 - 139MM HG: CPT | Performed by: FAMILY MEDICINE

## 2024-07-25 PROCEDURE — G0439 PPPS, SUBSEQ VISIT: HCPCS | Performed by: FAMILY MEDICINE

## 2024-07-25 PROCEDURE — 99213 OFFICE O/P EST LOW 20 MIN: CPT | Performed by: FAMILY MEDICINE

## 2024-07-25 PROCEDURE — 1123F ACP DISCUSS/DSCN MKR DOCD: CPT | Performed by: FAMILY MEDICINE

## 2024-07-25 PROCEDURE — 3017F COLORECTAL CA SCREEN DOC REV: CPT | Performed by: FAMILY MEDICINE

## 2024-07-25 PROCEDURE — 3051F HG A1C>EQUAL 7.0%<8.0%: CPT | Performed by: FAMILY MEDICINE

## 2024-07-25 PROCEDURE — 83036 HEMOGLOBIN GLYCOSYLATED A1C: CPT | Performed by: FAMILY MEDICINE

## 2024-07-25 PROCEDURE — 3079F DIAST BP 80-89 MM HG: CPT | Performed by: FAMILY MEDICINE

## 2024-07-25 RX ORDER — CELECOXIB 200 MG/1
200 CAPSULE ORAL 2 TIMES DAILY
Qty: 180 CAPSULE | Refills: 1 | Status: SHIPPED | OUTPATIENT
Start: 2024-07-25 | End: 2025-01-21

## 2024-07-25 NOTE — PROGRESS NOTES
showing kyphosis, mild paraspinal and trapezius muscle tightness and some tenderness.     CTAB. RRR. Trace edema    Hands with arthritic changes at MCP joints.             No Known Allergies  Prior to Visit Medications    Medication Sig Taking? Authorizing Provider   celecoxib (CELEBREX) 200 MG capsule Take 1 capsule by mouth 2 times daily Yes Desirae Padilla MD   traMADol (ULTRAM) 50 MG tablet Take 1 tablet by mouth every 6 hours as needed for Pain for up to 90 days. Max Daily Amount: 200 mg Yes Desirae Padilla MD   anastrozole (ARIMIDEX) 1 MG tablet Take 1 tablet by mouth daily Yes Desirae Padilla MD   atenolol (TENORMIN) 50 MG tablet Take 1 tablet by mouth daily Yes Desirae Padilla MD   lisinopril-hydroCHLOROthiazide (PRINZIDE;ZESTORETIC) 20-12.5 MG per tablet Take 1 tablet by mouth 2 times daily Yes Desirae Padilla MD   metFORMIN (GLUCOPHAGE) 500 MG tablet Take 1 tablet by mouth 2 times daily (with meals) Yes Desirae Padilla MD   simvastatin (ZOCOR) 10 MG tablet Take 1 tablet by mouth nightly Yes Desirae Padilla MD   traZODone (DESYREL) 50 MG tablet Take 1 tablet by mouth nightly as needed for Sleep Yes Desirae Padilla MD   blood glucose monitor kit and supplies Accu-chek Guide Blood Glucose Meter or other glucometer covered by insurance Yes Desirae Padilla MD   blood glucose monitor strips Test 1 times a day & as needed for symptoms of irregular blood glucose. Dispense sufficient amount for indicated testing frequency plus additional to accommodate PRN testing needs. Yes Desirae Padilla MD   Lancets MISC 1 each by Does not apply route daily Yes Desirae Padilla MD   Lancet Device MISC 1 each by Does not apply route continuous May use any brand Yes Desirae Padilla MD   therapeutic multivitamin-minerals (THERAGRAN-M) tablet Take 1 tablet by mouth daily Yes Tmoasa Blair MD   conjugated estrogens (PREMARIN) 0.625 MG/GM vaginal cream Place vaginally daily.  As

## 2024-07-31 ENCOUNTER — HOSPITAL ENCOUNTER (OUTPATIENT)
Age: 74
Discharge: HOME | End: 2024-07-31
Payer: MEDICARE

## 2024-07-31 ENCOUNTER — HOSPITAL ENCOUNTER (OUTPATIENT)
Dept: GENERAL RADIOLOGY | Age: 74
Discharge: HOME | End: 2024-07-31
Attending: FAMILY MEDICINE
Payer: MEDICARE

## 2024-07-31 ENCOUNTER — TELEPHONE (OUTPATIENT)
Dept: FAMILY MEDICINE CLINIC | Age: 74
End: 2024-07-31
Payer: MEDICARE

## 2024-07-31 DIAGNOSIS — M54.2 NECK PAIN: Primary | ICD-10-CM

## 2024-07-31 DIAGNOSIS — M54.2 NECK PAIN: ICD-10-CM

## 2024-07-31 DIAGNOSIS — M40.203 KYPHOSIS OF CERVICOTHORACIC REGION, UNSPECIFIED KYPHOSIS TYPE: ICD-10-CM

## 2024-07-31 DIAGNOSIS — M19.049 HAND ARTHRITIS: ICD-10-CM

## 2024-07-31 DIAGNOSIS — M19.90 ARTHRITIS: Chronic | ICD-10-CM

## 2024-07-31 LAB
CRP SERPL-MCNC: 1.23 MG/DL (ref 0–1)
ERYTHROCYTE [SEDIMENTATION RATE] IN BLOOD BY WESTERGREN METHOD: 11 MM/HR (ref 0–20)
RHEUMATOID FACT SERPL-ACNC: < 10 IU/ML (ref 0–13)

## 2024-07-31 PROCEDURE — 85651 RBC SED RATE NONAUTOMATED: CPT

## 2024-07-31 PROCEDURE — 72040 X-RAY EXAM NECK SPINE 2-3 VW: CPT

## 2024-07-31 PROCEDURE — 86038 ANTINUCLEAR ANTIBODIES: CPT

## 2024-07-31 PROCEDURE — 72040 X-RAY EXAM NECK SPINE 2-3 VW: CPT | Performed by: FAMILY MEDICINE

## 2024-07-31 PROCEDURE — 86200 CCP ANTIBODY: CPT

## 2024-07-31 PROCEDURE — 86140 C-REACTIVE PROTEIN: CPT

## 2024-07-31 PROCEDURE — 86430 RHEUMATOID FACTOR TEST QUAL: CPT

## 2024-07-31 PROCEDURE — 36415 COLL VENOUS BLD VENIPUNCTURE: CPT

## 2024-07-31 NOTE — TELEPHONE ENCOUNTER
Rhea @ [facility] radiology department requesting corrected orders for neck xrays  Patient there to have xrays done  Orders received are for 4-5 views but protocol is 2-3 views  Entered updated orders

## 2024-08-01 LAB — NUCLEAR IGG SER QL IA: NORMAL

## 2024-08-02 LAB — CYCLIC CITRULLINATED PEPTIDE ANTIBODY IGG: 0.8 U/ML (ref 0–7)

## 2024-08-07 DIAGNOSIS — E11.9 TYPE 2 DIABETES MELLITUS WITHOUT COMPLICATION, WITHOUT LONG-TERM CURRENT USE OF INSULIN (HCC): Chronic | ICD-10-CM

## 2024-08-07 DIAGNOSIS — F11.90 CHRONIC, CONTINUOUS USE OF OPIOIDS: ICD-10-CM

## 2024-08-07 RX ORDER — TRAMADOL HYDROCHLORIDE 50 MG/1
TABLET ORAL
Qty: 60 TABLET | OUTPATIENT
Start: 2024-08-07

## 2024-08-07 NOTE — TELEPHONE ENCOUNTER
Heaven Zavala called requesting a refill on the following medications:  Requested Prescriptions     Pending Prescriptions Disp Refills    metFORMIN (GLUCOPHAGE) 500 MG tablet [Pharmacy Med Name: METFORMIN  MG TABLET] 60 tablet      Sig: take 1 tablet by mouth twice a day with meals       Date of last visit: 7/25/2024  Date of next visit (if applicable):1/29/2025  Date of last refill: 1/27/24  Pharmacy Name: [facility] LPN

## 2024-08-19 ENCOUNTER — HOSPITAL ENCOUNTER (OUTPATIENT)
Dept: PHYSICAL THERAPY | Age: 74
Setting detail: THERAPIES SERIES
Discharge: HOME | End: 2024-08-19
Payer: MEDICARE

## 2024-08-19 PROCEDURE — 97110 THERAPEUTIC EXERCISES: CPT

## 2024-08-19 PROCEDURE — 97162 PT EVAL MOD COMPLEX 30 MIN: CPT

## 2024-08-19 NOTE — PROGRESS NOTES
treatment.  AQUATIC THERAPY COVERED:   Yes  MODALITIES COVERED:  Yes, with the exception of iontophoresis and hot packs/cold packs  TELEHEALTH COVERED: Yes   Approved Procedure Codes: 40922 - Therapeutic Exercise    25370 - Manual Therapy   91258 - Ultrasound   90386 - Manual Electrical Stimulation  (Codes requested indicated by red font, codes approved indicated by black font)   Visit # 1, 1/10 for progress note (Reporting Period: 8/19/24 to     )   Visits Allowed: unlimited   Recertification Date: 11/19/24   Physician Follow-Up: F/u with Dr. Padilla in 2 months, return to oncologist Q 3 months with Breast cancer September 3 with mammogram .  Lumpectomy in 2022, currently on daily medications x 5 years orally, mammogram yearly.     Physician Orders:    History of Present Illness: 20 + year history of LBP, thoracic pain, neck pain.  Patient to family MD for wellness visit and mentioned increasing pain in back, neck.  Did test for RA , negative blood work.  Did order PT with discussion of dry needling.  Currently taking tramadol for pain x 15 years, 2 x per day.      Patient reports B hand weakness, difficulty making fist B x 30 years.     Previous L total shoulder replacement 10/23 with 1 year f/u with Dr. Arias in 10/24.       SUBJECTIVE: see above    Social/Functional History and Current Status:  Medications and Allergies have been reviewed and are listed on Medical History Questionnaire.    Heaven Zavala lives alone in a single story home with stairs and no handrail to enter. 1 DARCI with no HR, difficulty on multiple steps with weakness in legs    Task Previous Current   ADL’s  Independent Modified Independent increased pain with moving head with driving.  Fall walking in spring .   IADL's Independent Modified Independent  difficulty looking up into kitchen counter, difficulty with balance and fear of fall   Ambulation Independent Modified Independent walking 45 minutes with LBP 9/10.  Unable to bend

## 2024-08-21 ENCOUNTER — HOSPITAL ENCOUNTER (OUTPATIENT)
Dept: PHYSICAL THERAPY | Age: 74
Setting detail: THERAPIES SERIES
Discharge: HOME | End: 2024-08-21
Payer: MEDICARE

## 2024-08-21 PROCEDURE — 97035 APP MDLTY 1+ULTRASOUND EA 15: CPT

## 2024-08-21 PROCEDURE — 97110 THERAPEUTIC EXERCISES: CPT

## 2024-08-21 NOTE — PROGRESS NOTES
[facility]  PHYSICAL THERAPY  [] EVALUATION  [x] DAILY NOTE (LAND) [] DAILY NOTE (AQUATIC ) [] PROGRESS NOTE [] DISCHARGE NOTE    [] [facility] [place]   [x] [facility]    [] [facility]   [] [facility]    Date: 2024  Patient Name:  Heaven Zavala  : 1950  MRN: [id number]  CSN: [id number]    Referring Practitioner Desirae Padilla MD    Diagnosis Cervicalgia  Radiculopathy, lumbar region  Unspecified kyphosis, cervicothoracic region   Treatment Diagnosis M54.2  Neck Pain  M54.59  Other Low Back Pain  R53.1 Weakness  M62.81 Generalized Muscle Weakness   Date of Evaluation 24    Additional Pertinent History B TKR, B THR, B shoulder replacements in past      Functional Outcome Measure Used Neck disabilty scale   Functional Outcome Score Eval 22 (24)       Insurance: Primary: Payor: MEDICARE /  /  / ,   Secondary: [employer]   Authorization Information:       DEDUCTIBLE:  $[id number]              OUT OF POCKET:  N/A              INSURANCE PAYS AT:   80%              PATIENT RESPONSIBILITY AND/OR CO-PAY:  20%  SECONDARY INSURANCE COMPANY: [employer].       PRECERTIFICATION REQUIRED:  No  INSURANCE THERAPY BENEFIT:  Patient has unlimited visits based on medical necessity  Benefit will not cover maintenance or preventative treatment.  AQUATIC THERAPY COVERED:   Yes  MODALITIES COVERED:  Yes, with the exception of iontophoresis and hot packs/cold packs  TELEHEALTH COVERED: Yes   Approved Procedure Codes: 26592 - Therapeutic Exercise    72930 - Manual Therapy   33069 - Ultrasound   34120 - Manual Electrical Stimulation  (Codes requested indicated by red font, codes approved indicated by black font)   Visit # 2, 2/10 for progress note (Reporting Period: 24 to     )   Visits Allowed: unlimited   Recertification Date: 24   Physician Follow-Up: F/u with Dr. Padilla in 2 months, return to oncologist Q 3

## 2024-08-22 DIAGNOSIS — F11.90 CHRONIC, CONTINUOUS USE OF OPIOIDS: ICD-10-CM

## 2024-08-22 NOTE — TELEPHONE ENCOUNTER
April 19, 2022      RE: Ashley Chavez  [address] [place] [id number]       To whom it may concern:    Ashley Chavez was hospitalized from 4/18/2022 until 4/19/2022. She is to remain out of work until cleared by her PCP or GI team.    Sincerely,       GABRIEL Lundberg CNP      Heaven Zavala called requesting a refill on the following medications:  Requested Prescriptions     Pending Prescriptions Disp Refills    traMADol (ULTRAM) 50 MG tablet 60 tablet 0     Sig: Take 1 tablet by mouth every 6 hours as needed for Pain for up to 90 days. Max Daily Amount: 200 mg       Date of last visit: 7/25/2024  Date of next visit (if applicable):1/29/2025  Date of last refill: 7/2024   Pharmacy Name: Mariel Cervantes MA

## 2024-08-23 RX ORDER — TRAMADOL HYDROCHLORIDE 50 MG/1
50 TABLET ORAL EVERY 6 HOURS PRN
Qty: 60 TABLET | Refills: 0 | Status: SHIPPED | OUTPATIENT
Start: 2024-08-23 | End: 2024-11-21

## 2024-08-27 ENCOUNTER — HOSPITAL ENCOUNTER (OUTPATIENT)
Dept: PHYSICAL THERAPY | Age: 74
Setting detail: THERAPIES SERIES
Discharge: HOME | End: 2024-08-27
Payer: MEDICARE

## 2024-08-27 PROCEDURE — 97110 THERAPEUTIC EXERCISES: CPT

## 2024-08-27 NOTE — PROGRESS NOTES
[facility]  PHYSICAL THERAPY  [] EVALUATION  [x] DAILY NOTE (LAND) [] DAILY NOTE (AQUATIC ) [] PROGRESS NOTE [] DISCHARGE NOTE    [] [facility] [place]   [x] [place] [facility]    [] [facility]   [] [facility]    Date: 2024  Patient Name:  Heaven Zavala  : 1950  MRN: [id number]  CSN: [id number]    Referring Practitioner Desirae Padilla MD    Diagnosis Cervicalgia  Radiculopathy, lumbar region  Unspecified kyphosis, cervicothoracic region   Treatment Diagnosis M54.2  Neck Pain  M54.59  Other Low Back Pain  R53.1 Weakness  M62.81 Generalized Muscle Weakness   Date of Evaluation 24    Additional Pertinent History B TKR, B THR, B shoulder replacements in past      Functional Outcome Measure Used Neck disabilty scale   Functional Outcome Score Eval 22 (24)       Insurance: Primary: Payor: MEDICARE /  /  / ,   Secondary: [employer]   Authorization Information:       DEDUCTIBLE:  $240              OUT OF POCKET:  N/A              INSURANCE PAYS AT:   80%              PATIENT RESPONSIBILITY AND/OR CO-PAY:  20%  SECONDARY INSURANCE COMPANY: [employer].       PRECERTIFICATION REQUIRED:  No  INSURANCE THERAPY BENEFIT:  Patient has unlimited visits based on medical necessity  Benefit will not cover maintenance or preventative treatment.  AQUATIC THERAPY COVERED:   Yes  MODALITIES COVERED:  Yes, with the exception of iontophoresis and hot packs/cold packs  TELEHEALTH COVERED: Yes   Approved Procedure Codes: 68528 - Therapeutic Exercise    36044 - Manual Therapy   35303 - Ultrasound   76154 - Manual Electrical Stimulation  (Codes requested indicated by red font, codes approved indicated by black font)   Visit # 3, 3/10 for progress note (Reporting Period: 24 to     )   Visits Allowed: unlimited   Recertification Date: 24   Physician Follow-Up: F/u with Dr. Padilla in 2 months, return to oncologist Q 3

## 2024-08-29 ENCOUNTER — HOSPITAL ENCOUNTER (OUTPATIENT)
Dept: PHYSICAL THERAPY | Age: 74
Setting detail: THERAPIES SERIES
Discharge: HOME | End: 2024-08-29
Payer: MEDICARE

## 2024-08-29 PROCEDURE — 97110 THERAPEUTIC EXERCISES: CPT

## 2024-08-29 PROCEDURE — 97035 APP MDLTY 1+ULTRASOUND EA 15: CPT

## 2024-08-29 NOTE — PROGRESS NOTES
[facility]  PHYSICAL THERAPY  [] EVALUATION  [x] DAILY NOTE (LAND) [] DAILY NOTE (AQUATIC ) [] PROGRESS NOTE [] DISCHARGE NOTE    [] [facility] [place]   [x] [place] [facility]    [] [facility]   [] [facility]    Date: 2024  Patient Name:  Heaven Zavala  : 1950  MRN: [id number]  CSN: [id number]    Referring Practitioner Desirae Padilla MD    Diagnosis Cervicalgia  Radiculopathy, lumbar region  Unspecified kyphosis, cervicothoracic region   Treatment Diagnosis M54.2  Neck Pain  M54.59  Other Low Back Pain  R53.1 Weakness  M62.81 Generalized Muscle Weakness   Date of Evaluation 24    Additional Pertinent History B TKR, B THR, B shoulder replacements in past      Functional Outcome Measure Used Neck disability scale   Functional Outcome Score Eval 22 (24)       Insurance: Primary: Payor: MEDICARE /  /  / ,   Secondary: [employer]   Authorization Information:       DEDUCTIBLE:  $240              OUT OF POCKET:  N/A              INSURANCE PAYS AT:   80%              PATIENT RESPONSIBILITY AND/OR CO-PAY:  20%  SECONDARY INSURANCE COMPANY: [employer].       PRECERTIFICATION REQUIRED:  No  INSURANCE THERAPY BENEFIT:  Patient has unlimited visits based on medical necessity  Benefit will not cover maintenance or preventative treatment.  AQUATIC THERAPY COVERED:   Yes  MODALITIES COVERED:  Yes, with the exception of iontophoresis and hot packs/cold packs  TELEHEALTH COVERED: Yes   Approved Procedure Codes: 41307 - Therapeutic Exercise    60753 - Manual Therapy   67266 - Ultrasound   92513 - Manual Electrical Stimulation  (Codes requested indicated by red font, codes approved indicated by black font)   Visit # 3, 3/10 for progress note (Reporting Period: 24 to     )   Visits Allowed: unlimited   Recertification Date: 24   Physician Follow-Up: F/u with Dr. Padilla in 2 months, return to oncologist Q 3  months with Breast cancer September 3 with mammogram .  Lumpectomy in 2022, currently on daily medications x 5 years orally, mammogram yearly.     Physician Orders:    History of Present Illness: 20 + year history of LBP, thoracic pain, neck pain.  Patient to family MD for wellness visit and mentioned increasing pain in back, neck.  Did test for RA , negative blood work.  Did order PT with discussion of dry needling.  Currently taking tramadol for pain x 15 years, 2 x per day.      Patient reports B hand weakness, difficulty making fist B x 30 years.     Previous L total shoulder replacement 10/23 with 1 year f/u with Dr. Arias in 10/24.       SUBJECTIVE: LBP 7/10, neck pain 9/10.      OBJECTIVE:      TREATMENT   Precautions: None currently   Pain: Neck 2/10, LBP 0/10 at rest    \"X” in shaded column indicates activity completed today    “*\" next to exercise/intervention indicates progression   Modalities Parameters/  Location  Notes   US 1 mHz, 100%,  5 min ea 10 min total x Pt seated               Manual Therapy Time/Technique  Notes   Dry needling 5 minutes  Prone face in cut out - needling to neck as noted.  Decreased neck pain from 9 to 3/10 after               Exercise/Intervention   Notes   Backward shoulder circles 15*  x    Shoulder blade pinch 15*  x    UT/LT/LS stretch  5 x 5s ea side  x    Thoracic extension over ball* 10x 3 sec  x           Supine abdominal bracing knees on bolster 10x 5 seconds  x    Alternating shoulder flexion* 10  X    SAQ alternating legs 10* x 5 seconds  x    Abdominal bracing with UE lifts 12 inch alt. * 5x ea UE      Abdominal bracing with hip add 10* x 5s  x    Abdominal bracing with quad sets 10* x 5s  x    SLR* 5  x           LAQ* 10  x           Specific Interventions Next Treatment: Try US to neck when with PTA- 1 mHz, 100% , 8 minutes, try dry needling as indicated with PT, DLSP, scapular exercises, progress to cervical isometric, gentle cervical stretches.    Dry

## 2024-08-30 ENCOUNTER — HOSPITAL ENCOUNTER (OUTPATIENT)
Dept: MAMMOGRAPHY | Age: 74
Discharge: HOME | End: 2024-08-30
Payer: MEDICARE

## 2024-08-30 VITALS — WEIGHT: 193 LBS | BODY MASS INDEX: 30.29 KG/M2 | HEIGHT: 67 IN

## 2024-08-30 DIAGNOSIS — Z12.31 ENCOUNTER FOR SCREENING MAMMOGRAM FOR BREAST CANCER: ICD-10-CM

## 2024-08-30 PROCEDURE — 77063 BREAST TOMOSYNTHESIS BI: CPT

## 2024-09-03 ENCOUNTER — APPOINTMENT (OUTPATIENT)
Dept: PHYSICAL THERAPY | Age: 74
End: 2024-09-03
Payer: MEDICARE

## 2024-09-05 ENCOUNTER — HOSPITAL ENCOUNTER (OUTPATIENT)
Dept: PHYSICAL THERAPY | Age: 74
Setting detail: THERAPIES SERIES
Discharge: HOME | End: 2024-09-05
Payer: MEDICARE

## 2024-09-05 PROCEDURE — 97110 THERAPEUTIC EXERCISES: CPT

## 2024-09-05 NOTE — PROGRESS NOTES
[facility]  PHYSICAL THERAPY  [] EVALUATION  [x] DAILY NOTE (LAND) [] DAILY NOTE (AQUATIC ) [] PROGRESS NOTE [] DISCHARGE NOTE    [] [facility] [place]   [x] [place] [facility]    [] [facility]   [] [facility]    Date: 2024  Patient Name:  Heaven Zavala  : 1950  MRN: [id number]  CSN: [id number]    Referring Practitioner Desirae Padilla MD    Diagnosis Cervicalgia  Radiculopathy, lumbar region  Unspecified kyphosis, cervicothoracic region   Treatment Diagnosis M54.2  Neck Pain  M54.59  Other Low Back Pain  R53.1 Weakness  M62.81 Generalized Muscle Weakness   Date of Evaluation 24    Additional Pertinent History B TKR, B THR, B shoulder replacements in past      Functional Outcome Measure Used Neck disabilty scale   Functional Outcome Score Eval 22 (24)       Insurance: Primary: Payor: MEDICARE /  /  / ,   Secondary: [employer]   Authorization Information:       DEDUCTIBLE:  $240              OUT OF POCKET:  N/A              INSURANCE PAYS AT:   80%              PATIENT RESPONSIBILITY AND/OR CO-PAY:  20%  SECONDARY INSURANCE COMPANY: [employer].       PRECERTIFICATION REQUIRED:  No  INSURANCE THERAPY BENEFIT:  Patient has unlimited visits based on medical necessity  Benefit will not cover maintenance or preventative treatment.  AQUATIC THERAPY COVERED:   Yes  MODALITIES COVERED:  Yes, with the exception of iontophoresis and hot packs/cold packs  TELEHEALTH COVERED: Yes   Approved Procedure Codes: 26241 - Therapeutic Exercise    25769 - Manual Therapy   90902 - Ultrasound   16914 - Manual Electrical Stimulation  (Codes requested indicated by red font, codes approved indicated by black font)   Visit # 4, 4/10 for progress note (Reporting Period: 24 to     )   Visits Allowed: unlimited   Recertification Date: 24   Physician Follow-Up: F/u with Dr. Padilla in 2 months, return to oncologist Q 3

## 2024-09-09 ENCOUNTER — HOSPITAL ENCOUNTER (OUTPATIENT)
Dept: PHYSICAL THERAPY | Age: 74
Setting detail: THERAPIES SERIES
Discharge: HOME | End: 2024-09-09
Payer: MEDICARE

## 2024-09-09 PROCEDURE — 97110 THERAPEUTIC EXERCISES: CPT

## 2024-09-11 ENCOUNTER — HOSPITAL ENCOUNTER (OUTPATIENT)
Dept: PHYSICAL THERAPY | Age: 74
Setting detail: THERAPIES SERIES
Discharge: HOME | End: 2024-09-11
Payer: MEDICARE

## 2024-09-11 PROCEDURE — 97110 THERAPEUTIC EXERCISES: CPT

## 2024-09-12 ENCOUNTER — HOSPITAL ENCOUNTER (OUTPATIENT)
Dept: INFUSION THERAPY | Age: 74
Discharge: HOME | End: 2024-09-12
Payer: MEDICARE

## 2024-09-12 ENCOUNTER — OFFICE VISIT (OUTPATIENT)
Dept: ONCOLOGY | Age: 74
End: 2024-09-12
Payer: MEDICARE

## 2024-09-12 VITALS
HEART RATE: 70 BPM | SYSTOLIC BLOOD PRESSURE: 138 MMHG | RESPIRATION RATE: 18 BRPM | BODY MASS INDEX: 30.76 KG/M2 | DIASTOLIC BLOOD PRESSURE: 76 MMHG | OXYGEN SATURATION: 97 % | TEMPERATURE: 98.3 F | HEIGHT: 67 IN | WEIGHT: 196 LBS

## 2024-09-12 VITALS
RESPIRATION RATE: 18 BRPM | DIASTOLIC BLOOD PRESSURE: 76 MMHG | HEART RATE: 70 BPM | SYSTOLIC BLOOD PRESSURE: 138 MMHG | TEMPERATURE: 98.3 F

## 2024-09-12 DIAGNOSIS — Z85.3 ENCOUNTER FOR FOLLOW-UP SURVEILLANCE OF BREAST CANCER: ICD-10-CM

## 2024-09-12 DIAGNOSIS — Z17.0 MALIGNANT NEOPLASM OF LOWER-OUTER QUADRANT OF RIGHT BREAST OF FEMALE, ESTROGEN RECEPTOR POSITIVE (HCC): ICD-10-CM

## 2024-09-12 DIAGNOSIS — Z08 ENCOUNTER FOR FOLLOW-UP SURVEILLANCE OF BREAST CANCER: ICD-10-CM

## 2024-09-12 DIAGNOSIS — Z12.31 ENCOUNTER FOR SCREENING MAMMOGRAM FOR BREAST CANCER: Primary | ICD-10-CM

## 2024-09-12 DIAGNOSIS — Z79.811 PROPHYLACTIC USE OF ANASTROZOLE (ARIMIDEX): ICD-10-CM

## 2024-09-12 DIAGNOSIS — Z98.890 STATUS POST RIGHT BREAST LUMPECTOMY: ICD-10-CM

## 2024-09-12 DIAGNOSIS — C50.511 MALIGNANT NEOPLASM OF LOWER-OUTER QUADRANT OF RIGHT BREAST OF FEMALE, ESTROGEN RECEPTOR POSITIVE (HCC): ICD-10-CM

## 2024-09-12 PROCEDURE — 3078F DIAST BP <80 MM HG: CPT | Performed by: SPECIALIST

## 2024-09-12 PROCEDURE — 3075F SYST BP GE 130 - 139MM HG: CPT | Performed by: SPECIALIST

## 2024-09-12 PROCEDURE — 99211 OFF/OP EST MAY X REQ PHY/QHP: CPT

## 2024-09-12 PROCEDURE — 99214 OFFICE O/P EST MOD 30 MIN: CPT | Performed by: SPECIALIST

## 2024-09-12 PROCEDURE — 1123F ACP DISCUSS/DSCN MKR DOCD: CPT | Performed by: SPECIALIST

## 2024-09-16 ENCOUNTER — HOSPITAL ENCOUNTER (OUTPATIENT)
Dept: PHYSICAL THERAPY | Age: 74
Setting detail: THERAPIES SERIES
Discharge: HOME | End: 2024-09-16
Payer: MEDICARE

## 2024-09-16 PROCEDURE — 97110 THERAPEUTIC EXERCISES: CPT

## 2024-09-18 ENCOUNTER — HOSPITAL ENCOUNTER (OUTPATIENT)
Dept: PHYSICAL THERAPY | Age: 74
Setting detail: THERAPIES SERIES
Discharge: HOME | End: 2024-09-18
Payer: MEDICARE

## 2024-09-18 PROCEDURE — 97110 THERAPEUTIC EXERCISES: CPT

## 2024-09-23 ENCOUNTER — HOSPITAL ENCOUNTER (OUTPATIENT)
Dept: PHYSICAL THERAPY | Age: 74
Setting detail: THERAPIES SERIES
Discharge: HOME | End: 2024-09-23
Payer: MEDICARE

## 2024-09-23 ENCOUNTER — TELEPHONE (OUTPATIENT)
Dept: FAMILY MEDICINE CLINIC | Age: 74
End: 2024-09-23

## 2024-09-23 DIAGNOSIS — F11.90 CHRONIC, CONTINUOUS USE OF OPIOIDS: ICD-10-CM

## 2024-09-23 PROCEDURE — 97110 THERAPEUTIC EXERCISES: CPT

## 2024-09-23 RX ORDER — TRAMADOL HYDROCHLORIDE 50 MG/1
50 TABLET ORAL EVERY 6 HOURS PRN
Qty: 60 TABLET | Refills: 3 | Status: SHIPPED | OUTPATIENT
Start: 2024-09-23 | End: 2024-09-24 | Stop reason: SDUPTHER

## 2024-09-24 RX ORDER — TRAMADOL HYDROCHLORIDE 50 MG/1
50 TABLET ORAL EVERY 6 HOURS PRN
Qty: 60 TABLET | Refills: 3 | Status: SHIPPED | OUTPATIENT
Start: 2024-09-24 | End: 2024-12-23

## 2024-09-27 ENCOUNTER — HOSPITAL ENCOUNTER (OUTPATIENT)
Dept: PHYSICAL THERAPY | Age: 74
Setting detail: THERAPIES SERIES
Discharge: HOME | End: 2024-09-27
Payer: MEDICARE

## 2024-09-27 PROCEDURE — 97110 THERAPEUTIC EXERCISES: CPT

## 2024-09-30 ENCOUNTER — HOSPITAL ENCOUNTER (OUTPATIENT)
Dept: PHYSICAL THERAPY | Age: 74
Setting detail: THERAPIES SERIES
Discharge: HOME | End: 2024-09-30
Payer: MEDICARE

## 2024-09-30 PROCEDURE — 97110 THERAPEUTIC EXERCISES: CPT

## 2024-09-30 NOTE — PROGRESS NOTES
[facility]  PHYSICAL THERAPY  [] EVALUATION  [] DAILY NOTE (LAND) [x] DAILY NOTE (AQUATIC ) [] PROGRESS NOTE [] DISCHARGE NOTE    [] [facility] [place]   [x] [place] [facility]    [] [facility]   [] [facility]    Date: 20[facility]  Patient Name:  Heaven Zavala  : 1950  MRN: [id number]  CSN: [id number]    Referring Practitioner Desirae Padilla MD    Diagnosis Cervicalgia  Radiculopathy, lumbar region  Unspecified kyphosis, cervicothoracic region   Treatment Diagnosis M54.2  Neck Pain  M54.59  Other Low Back Pain  R53.1 Weakness  M62.81 Generalized Muscle Weakness   Date of Evaluation 24    Additional Pertinent History B TKR, B THR, B shoulder replacements in past      Functional Outcome Measure Used Neck disabilty scale   Functional Outcome Score Eval 22 (24) , {N 17      Insurance: Primary: Payor: MEDICARE /  /  / ,   Secondary: [employer]   Authorization Information:       DEDUCTIBLE:  $240              OUT OF POCKET:  N/A              INSURANCE PAYS AT:   80%              PATIENT RESPONSIBILITY AND/OR CO-PAY:  20%  SECONDARY INSURANCE COMPANY: [employer].       PRECERTIFICATION REQUIRED:  No  INSURANCE THERAPY BENEFIT:  Patient has unlimited visits based on medical necessity  Benefit will not cover maintenance or preventative treatment.  AQUATIC THERAPY COVERED:   Yes  MODALITIES COVERED:  Yes, with the exception of iontophoresis and hot packs/cold packs  TELEHEALTH COVERED: Yes   Approved Procedure Codes: 01242 - Therapeutic Exercise    92604 - Manual Therapy   94081 - Ultrasound   43947 - Manual Electrical Stimulation  (Codes requested indicated by red font, codes approved indicated by black font)   Visit # 10, 2/10 for progress note (Reporting Period: 24 to     )   Visits Allowed: unlimited   Recertification Date: 24   Physician Follow-Up: F/u with Dr. Padilla in 2 months, return to

## 2024-10-03 ENCOUNTER — HOSPITAL ENCOUNTER (OUTPATIENT)
Dept: PHYSICAL THERAPY | Age: 74
Setting detail: THERAPIES SERIES
Discharge: HOME | End: 2024-10-03
Payer: MEDICARE

## 2024-10-03 PROCEDURE — 97110 THERAPEUTIC EXERCISES: CPT

## 2024-10-03 NOTE — PROGRESS NOTES
[facility]  PHYSICAL THERAPY  [] EVALUATION  [] DAILY NOTE (LAND) [x] DAILY NOTE (AQUATIC ) [] PROGRESS NOTE [] DISCHARGE NOTE    [] [facility] [place]   [x] [place] [facility]    [] [facility]   [] [facility]    Date: 10/3/2024  Patient Name:  Heaven Zavala  : 1950  MRN: [id number]  CSN: [id number]    Referring Practitioner Desirae Padilla MD    Diagnosis Cervicalgia  Radiculopathy, lumbar region  Unspecified kyphosis, cervicothoracic region   Treatment Diagnosis M54.2  Neck Pain  M54.59  Other Low Back Pain  R53.1 Weakness  M62.81 Generalized Muscle Weakness   Date of Evaluation 24    Additional Pertinent History B TKR, B THR, B shoulder replacements in past      Functional Outcome Measure Used Neck disabilty scale   Functional Outcome Score Eval 22 (24) , {N 17      Insurance: Primary: Payor: MEDICARE /  /  / ,   Secondary: [employer]   Authorization Information:       DEDUCTIBLE:  $240              OUT OF POCKET:  N/A              INSURANCE PAYS AT:   80%              PATIENT RESPONSIBILITY AND/OR CO-PAY:  20%  SECONDARY INSURANCE COMPANY: [employer].       PRECERTIFICATION REQUIRED:  No  INSURANCE THERAPY BENEFIT:  Patient has unlimited visits based on medical necessity  Benefit will not cover maintenance or preventative treatment.  AQUATIC THERAPY COVERED:   Yes  MODALITIES COVERED:  Yes, with the exception of iontophoresis and hot packs/cold packs  TELEHEALTH COVERED: Yes   Approved Procedure Codes: 63826 - Therapeutic Exercise    53865 - Manual Therapy   81578 - Ultrasound   48451 - Manual Electrical Stimulation  (Codes requested indicated by red font, codes approved indicated by black font)   Visit # 11, 1/10 for progress note (Reporting Period: 24 to     )   Visits Allowed: unlimited   Recertification Date: 24   Physician Follow-Up: F/u with Dr. Padilla in 2 months, return to

## 2024-10-07 ENCOUNTER — HOSPITAL ENCOUNTER (OUTPATIENT)
Dept: PHYSICAL THERAPY | Age: 74
Setting detail: THERAPIES SERIES
Discharge: HOME | End: 2024-10-07
Payer: MEDICARE

## 2024-10-07 PROCEDURE — 97110 THERAPEUTIC EXERCISES: CPT

## 2024-10-07 NOTE — PROGRESS NOTES
[facility]  PHYSICAL THERAPY  [] EVALUATION  [] DAILY NOTE (LAND) [x] DAILY NOTE (AQUATIC ) [] PROGRESS NOTE [] DISCHARGE NOTE    [] [facility] [place]   [x] [place] [facility]    [] [facility]   [] [facility]    Date: 10/7/2024  Patient Name:  Heaven Zavala  : 1950  MRN: [id number]  CSN: [id number]    Referring Practitioner Desirae Padilla MD    Diagnosis Cervicalgia  Radiculopathy, lumbar region  Unspecified kyphosis, cervicothoracic region   Treatment Diagnosis M54.2  Neck Pain  M54.59  Other Low Back Pain  R53.1 Weakness  M62.81 Generalized Muscle Weakness   Date of Evaluation 24    Additional Pertinent History B TKR, B THR, B shoulder replacements in past      Functional Outcome Measure Used Neck disabilty scale   Functional Outcome Score Eval 22 (24) , {N 17      Insurance: Primary: Payor: MEDICARE /  /  / ,   Secondary: [employer]   Authorization Information:       DEDUCTIBLE:  $240              OUT OF POCKET:  N/A              INSURANCE PAYS AT:   80%              PATIENT RESPONSIBILITY AND/OR CO-PAY:  20%  SECONDARY INSURANCE COMPANY: [employer].       PRECERTIFICATION REQUIRED:  No  INSURANCE THERAPY BENEFIT:  Patient has unlimited visits based on medical necessity  Benefit will not cover maintenance or preventative treatment.  AQUATIC THERAPY COVERED:   Yes  MODALITIES COVERED:  Yes, with the exception of iontophoresis and hot packs/cold packs  TELEHEALTH COVERED: Yes   Approved Procedure Codes: 13159 - Therapeutic Exercise    81665 - Manual Therapy   16333 - Ultrasound   89109 - Manual Electrical Stimulation  (Codes requested indicated by red font, codes approved indicated by black font)   Visit # 11, 1/10 for progress note (Reporting Period: 24 to     )   Visits Allowed: unlimited   Recertification Date: 24   Physician Follow-Up: F/u with Dr. Padilla in 2 months, return to

## 2024-10-10 ENCOUNTER — HOSPITAL ENCOUNTER (OUTPATIENT)
Dept: PHYSICAL THERAPY | Age: 74
Setting detail: THERAPIES SERIES
Discharge: HOME | End: 2024-10-10
Payer: MEDICARE

## 2024-10-10 PROCEDURE — 97110 THERAPEUTIC EXERCISES: CPT

## 2024-10-10 NOTE — DISCHARGE SUMMARY
[facility]  PHYSICAL THERAPY  [] EVALUATION  [] DAILY NOTE (LAND) [] DAILY NOTE (AQUATIC ) [] PROGRESS NOTE [x] DISCHARGE NOTE    [] [facility] [place]   [x] [place] [facility]    [] [facility]   [] [facility]    Date: 10/10/2024  Patient Name:  Heaven Zavala  : 1950  MRN: [id number]  CSN: [id number]    Referring Practitioner Desirae Padilla MD    Diagnosis Cervicalgia  Radiculopathy, lumbar region  Unspecified kyphosis, cervicothoracic region   Treatment Diagnosis M54.2  Neck Pain  M54.59  Other Low Back Pain  R53.1 Weakness  M62.81 Generalized Muscle Weakness   Date of Evaluation 24    Additional Pertinent History B TKR, B THR, B shoulder replacements in past      Functional Outcome Measure Used Neck disabilty scale   Functional Outcome Score Eval 22 (24) , {N 17      Insurance: Primary: Payor: MEDICARE /  /  / ,   Secondary: [employer]   Authorization Information:       DEDUCTIBLE:  $240              OUT OF POCKET:  N/A              INSURANCE PAYS AT:   80%              PATIENT RESPONSIBILITY AND/OR CO-PAY:  20%  SECONDARY INSURANCE COMPANY: [employer].       PRECERTIFICATION REQUIRED:  No  INSURANCE THERAPY BENEFIT:  Patient has unlimited visits based on medical necessity  Benefit will not cover maintenance or preventative treatment.  AQUATIC THERAPY COVERED:   Yes  MODALITIES COVERED:  Yes, with the exception of iontophoresis and hot packs/cold packs  TELEHEALTH COVERED: Yes   Approved Procedure 3Codes: 02313 - Therapeutic Exercise    77057 - Manual Therapy   34692 - Ultrasound   66669 - Manual Electrical Stimulation  (Codes requested indicated by red font, codes approved indicated by black font)   Visit # 12, 2/10 for progress note (Reporting Period: 24 to     )   Visits Allowed: unlimited   Recertification Date: 24   Physician Follow-Up: F/u with Dr. Padilla in 2 months, return to 
seconds  x    SAQ alternating legs 10* x 5 seconds  x    Abdominal bracing with UE lifts 12 inch alt. * 5x ea UE  x    Abdominal bracing with hip add* 10 x 5s  x    Abdominal bracing with quad sets* 10 x 5s  x    LTR 10 x 5 seconds  x    Bridge* 10* x 5 seconds  x Added the HEP   SLR  10 * x 5 seconds  x    NuStep Seat 10, LE and UE* 11, level 3*, 6* minutes  x    Standing isometric neck extension 1 pillow and 1 towels behind head 10x 5 seconds  x    Specific Interventions Next Treatment: Try US to neck when with PTA- 1 mHz, 100% , 8 minutes, try dry needling as indicated with PT, DLSP, scapular exercises, progress to cervical isometric, gentle cervical stretches.   Dry Needling Safety Questions Response   Are you currently receiving any form of cancer treatment? No   Do you have any form of a bleeding disorder? No   Have you ever fainted or experienced a seizure? No   Do you have a pacemaker or any other electrical implant?  No   Are you currently taking any anticoagulants? No   Are you currently taking antibiotics for an infection? No   Do you have a damaged heart valve, metal prosthesis, or other risk of infection? Yes: Comment: B Total shoulder replacement, B TKR, B THR   Are you pregnant or actively trying for a pregnancy? No   Do you have breast implants? No   Do you suffer from metal allergies? No   Are you diabetic or do you suffer from impaired wound healing? No   Do you have hepatitis B, hepatitis C, HIV, or any other infectious disease? No   Have you eaten in the last two hours? No    Dry needling manual therapy: needles were inserted, pistoned, rotated, and/or coned to produce intramuscular mobilization. These techniques were used to restore functional range of motion, reduce muscle spasm and induce healing in the corresponding musculature. (82705).  Clean Technique was utilized today while applying Dry needling treatment. The treatment sites were cleaned with 70% solution of isopropyl alcohol. The

## 2024-10-31 DIAGNOSIS — M19.90 ARTHRITIS: Chronic | ICD-10-CM

## 2024-10-31 RX ORDER — CELECOXIB 200 MG/1
200 CAPSULE ORAL 2 TIMES DAILY
Qty: 180 CAPSULE | Refills: 3 | Status: SHIPPED | OUTPATIENT
Start: 2024-10-31

## 2024-10-31 NOTE — TELEPHONE ENCOUNTER
Heaven Zavala called requesting a refill on the following medications:  Requested Prescriptions     Pending Prescriptions Disp Refills    celecoxib (CELEBREX) 200 MG capsule [Pharmacy Med Name: Celecoxib Oral Capsule 200 MG] 180 capsule 3     Sig: TAKE 1 CAPSULE TWICE DAILY       Date of last visit: 7/25/2024  Date of next visit (if applicable):1/29/2025  Date of last refill: 07/25/24  Pharmacy Name: [facility]

## 2025-01-26 SDOH — ECONOMIC STABILITY: INCOME INSECURITY: IN THE LAST 12 MONTHS, WAS THERE A TIME WHEN YOU WERE NOT ABLE TO PAY THE MORTGAGE OR RENT ON TIME?: NO

## 2025-01-26 SDOH — ECONOMIC STABILITY: FOOD INSECURITY: WITHIN THE PAST 12 MONTHS, YOU WORRIED THAT YOUR FOOD WOULD RUN OUT BEFORE YOU GOT MONEY TO BUY MORE.: NEVER TRUE

## 2025-01-26 SDOH — ECONOMIC STABILITY: TRANSPORTATION INSECURITY
IN THE PAST 12 MONTHS, HAS THE LACK OF TRANSPORTATION KEPT YOU FROM MEDICAL APPOINTMENTS OR FROM GETTING MEDICATIONS?: NO

## 2025-01-26 SDOH — ECONOMIC STABILITY: FOOD INSECURITY: WITHIN THE PAST 12 MONTHS, THE FOOD YOU BOUGHT JUST DIDN'T LAST AND YOU DIDN'T HAVE MONEY TO GET MORE.: NEVER TRUE

## 2025-01-26 ASSESSMENT — PATIENT HEALTH QUESTIONNAIRE - PHQ9
SUM OF ALL RESPONSES TO PHQ QUESTIONS 1-9: 0
SUM OF ALL RESPONSES TO PHQ QUESTIONS 1-9: 0
SUM OF ALL RESPONSES TO PHQ9 QUESTIONS 1 & 2: 0
2. FEELING DOWN, DEPRESSED OR HOPELESS: NOT AT ALL
SUM OF ALL RESPONSES TO PHQ9 QUESTIONS 1 & 2: 0
1. LITTLE INTEREST OR PLEASURE IN DOING THINGS: NOT AT ALL
2. FEELING DOWN, DEPRESSED OR HOPELESS: NOT AT ALL
SUM OF ALL RESPONSES TO PHQ QUESTIONS 1-9: 0
1. LITTLE INTEREST OR PLEASURE IN DOING THINGS: NOT AT ALL
SUM OF ALL RESPONSES TO PHQ QUESTIONS 1-9: 0

## 2025-01-28 ENCOUNTER — HOSPITAL ENCOUNTER (OUTPATIENT)
Dept: WOMENS IMAGING | Age: 75
Discharge: HOME | End: 2025-01-28
Attending: SPECIALIST
Payer: MEDICARE

## 2025-01-28 DIAGNOSIS — C50.511 MALIGNANT NEOPLASM OF LOWER-OUTER QUADRANT OF RIGHT BREAST OF FEMALE, ESTROGEN RECEPTOR POSITIVE (HCC): ICD-10-CM

## 2025-01-28 DIAGNOSIS — Z79.811 PROPHYLACTIC USE OF ANASTROZOLE (ARIMIDEX): ICD-10-CM

## 2025-01-28 DIAGNOSIS — Z17.0 MALIGNANT NEOPLASM OF LOWER-OUTER QUADRANT OF RIGHT BREAST OF FEMALE, ESTROGEN RECEPTOR POSITIVE (HCC): ICD-10-CM

## 2025-01-28 PROCEDURE — 77080 DXA BONE DENSITY AXIAL: CPT

## 2025-01-29 ENCOUNTER — OFFICE VISIT (OUTPATIENT)
Dept: FAMILY MEDICINE CLINIC | Age: 75
End: 2025-01-29

## 2025-01-29 VITALS
DIASTOLIC BLOOD PRESSURE: 84 MMHG | RESPIRATION RATE: 18 BRPM | HEART RATE: 87 BPM | BODY MASS INDEX: 30.82 KG/M2 | WEIGHT: 196.8 LBS | SYSTOLIC BLOOD PRESSURE: 132 MMHG | OXYGEN SATURATION: 96 %

## 2025-01-29 DIAGNOSIS — F11.90 CHRONIC, CONTINUOUS USE OF OPIOIDS: ICD-10-CM

## 2025-01-29 DIAGNOSIS — F51.01 PRIMARY INSOMNIA: ICD-10-CM

## 2025-01-29 DIAGNOSIS — I10 ESSENTIAL HYPERTENSION: Chronic | ICD-10-CM

## 2025-01-29 DIAGNOSIS — E78.00 PURE HYPERCHOLESTEROLEMIA: Chronic | ICD-10-CM

## 2025-01-29 DIAGNOSIS — I47.10 SVT (SUPRAVENTRICULAR TACHYCARDIA) (HCC): Chronic | ICD-10-CM

## 2025-01-29 DIAGNOSIS — E11.9 TYPE 2 DIABETES MELLITUS WITHOUT COMPLICATION, WITHOUT LONG-TERM CURRENT USE OF INSULIN (HCC): Chronic | ICD-10-CM

## 2025-01-29 LAB — HBA1C MFR BLD: 7.2 %

## 2025-01-29 RX ORDER — TRAMADOL HYDROCHLORIDE 50 MG/1
50 TABLET ORAL EVERY 6 HOURS PRN
Qty: 60 TABLET | Refills: 5 | Status: SHIPPED | OUTPATIENT
Start: 2025-01-29 | End: 2025-04-29

## 2025-01-29 RX ORDER — ATENOLOL 50 MG/1
50 TABLET ORAL DAILY
Qty: 90 TABLET | Refills: 3 | Status: SHIPPED | OUTPATIENT
Start: 2025-01-29 | End: 2026-01-29

## 2025-01-29 RX ORDER — LISINOPRIL AND HYDROCHLOROTHIAZIDE 12.5; 2 MG/1; MG/1
1 TABLET ORAL 2 TIMES DAILY
Qty: 180 TABLET | Refills: 3 | Status: SHIPPED | OUTPATIENT
Start: 2025-01-29

## 2025-01-29 RX ORDER — ANASTROZOLE 1 MG/1
1 TABLET ORAL DAILY
Qty: 90 TABLET | Refills: 3 | Status: SHIPPED | OUTPATIENT
Start: 2025-01-29

## 2025-01-29 RX ORDER — SIMVASTATIN 10 MG
10 TABLET ORAL NIGHTLY
Qty: 90 TABLET | Refills: 3 | Status: SHIPPED | OUTPATIENT
Start: 2025-01-29

## 2025-01-29 RX ORDER — TRAZODONE HYDROCHLORIDE 50 MG/1
50 TABLET, FILM COATED ORAL NIGHTLY PRN
Qty: 90 TABLET | Refills: 3 | Status: SHIPPED | OUTPATIENT
Start: 2025-01-29

## 2025-01-29 NOTE — PROGRESS NOTES
Heaven Zavala (:  1950) is a 74 y.o. female,Established patient, here for evaluation of the following chief complaint(s):  6 Month Follow-Up      Assessment & Plan   ASSESSMENT/PLAN:  1. Primary insomnia  -     traZODone (DESYREL) 50 MG tablet; Take 1 tablet by mouth nightly as needed for Sleep, Disp-90 tablet, R-3Normal  -     Comprehensive Metabolic Panel; Future  -     CBC with Auto Differential; Future  -     Hemoglobin A1C; Future  -     Lipid Panel; Future  -     Albumin/Creatinine Ratio, Urine; Future  -     Magnesium; Future  -     TSH reflex to FT4; Future  -     Vitamin B12; Future  2. Chronic, continuous use of opioids  -     traMADol (ULTRAM) 50 MG tablet; Take 1 tablet by mouth every 6 hours as needed for Pain for up to 90 days. Max Daily Amount: 200 mg, Disp-60 tablet, R-5Normal  -     Comprehensive Metabolic Panel; Future  -     CBC with Auto Differential; Future  -     Hemoglobin A1C; Future  -     Lipid Panel; Future  -     Albumin/Creatinine Ratio, Urine; Future  -     Magnesium; Future  -     TSH reflex to FT4; Future  -     Vitamin B12; Future  3. Pure hypercholesterolemia  -     simvastatin (ZOCOR) 10 MG tablet; Take 1 tablet by mouth nightly, Disp-90 tablet, R-3Normal  -     Comprehensive Metabolic Panel; Future  -     CBC with Auto Differential; Future  -     Hemoglobin A1C; Future  -     Lipid Panel; Future  -     Albumin/Creatinine Ratio, Urine; Future  -     Magnesium; Future  -     TSH reflex to FT4; Future  -     Vitamin B12; Future  4. Type 2 diabetes mellitus without complication, without long-term current use of insulin (HCC)  -     metFORMIN (GLUCOPHAGE) 500 MG tablet; Take 1 tablet by mouth 2 times daily (with meals), Disp-180 tablet, R-3Normal  -     lisinopril-hydroCHLOROthiazide (PRINZIDE;ZESTORETIC) 20-12.5 MG per tablet; Take 1 tablet by mouth 2 times daily, Disp-180 tablet, R-3Normal  -     POCT glycosylated hemoglobin (Hb A1C)  -     Comprehensive Metabolic

## 2025-02-01 ASSESSMENT — ENCOUNTER SYMPTOMS: SHORTNESS OF BREATH: 0

## 2025-07-22 ENCOUNTER — HOSPITAL ENCOUNTER (OUTPATIENT)
Age: 75
Discharge: HOME | End: 2025-07-22
Payer: MEDICARE

## 2025-07-22 DIAGNOSIS — I10 ESSENTIAL HYPERTENSION: Chronic | ICD-10-CM

## 2025-07-22 DIAGNOSIS — F51.01 PRIMARY INSOMNIA: ICD-10-CM

## 2025-07-22 DIAGNOSIS — E11.9 TYPE 2 DIABETES MELLITUS WITHOUT COMPLICATION, WITHOUT LONG-TERM CURRENT USE OF INSULIN (HCC): Chronic | ICD-10-CM

## 2025-07-22 DIAGNOSIS — F11.90 CHRONIC, CONTINUOUS USE OF OPIOIDS: ICD-10-CM

## 2025-07-22 DIAGNOSIS — E78.00 PURE HYPERCHOLESTEROLEMIA: Chronic | ICD-10-CM

## 2025-07-22 LAB
ALBUMIN SERPL BCG-MCNC: 4.3 G/DL (ref 3.4–4.9)
ALP SERPL-CCNC: 88 U/L (ref 38–126)
ALT SERPL W/O P-5'-P-CCNC: 22 U/L (ref 10–35)
ANION GAP SERPL CALC-SCNC: 9 MEQ/L (ref 8–16)
AST SERPL-CCNC: 25 U/L (ref 10–35)
BASOPHILS ABSOLUTE: 0 THOU/MM3 (ref 0–0.1)
BASOPHILS NFR BLD AUTO: 0.8 %
BILIRUB SERPL-MCNC: 0.4 MG/DL (ref 0.3–1.2)
BUN SERPL-MCNC: 21 MG/DL (ref 8–23)
CALCIUM SERPL-MCNC: 9.6 MG/DL (ref 8.8–10.2)
CHLORIDE SERPL-SCNC: 98 MEQ/L (ref 98–111)
CHOLEST SERPL-MCNC: 170 MG/DL (ref 100–199)
CO2 SERPL-SCNC: 29 MEQ/L (ref 22–29)
CREAT SERPL-MCNC: 1 MG/DL (ref 0.5–0.9)
CREAT UR-MCNC: 77.1 MG/DL
DEPRECATED MEAN GLUCOSE BLD GHB EST-ACNC: 171 MG/DL (ref 70–126)
DEPRECATED RDW RBC AUTO: 43.7 FL (ref 35–45)
EOSINOPHIL NFR BLD AUTO: 4.1 %
EOSINOPHILS ABSOLUTE: 0.2 THOU/MM3 (ref 0–0.4)
ERYTHROCYTE [DISTWIDTH] IN BLOOD BY AUTOMATED COUNT: 12.9 % (ref 11.5–14.5)
GFR SERPL CREATININE-BSD FRML MDRD: 59 ML/MIN/1.73M2
GLUCOSE SERPL-MCNC: 174 MG/DL (ref 74–109)
HBA1C MFR BLD HPLC: 7.7 % (ref 4–6)
HCT VFR BLD AUTO: 39.5 % (ref 37–47)
HDLC SERPL-MCNC: 50 MG/DL
HGB BLD-MCNC: 13.3 GM/DL (ref 12–16)
IMM GRANULOCYTES # BLD AUTO: 0.01 THOU/MM3 (ref 0–0.07)
IMM GRANULOCYTES NFR BLD AUTO: 0.3 %
LDLC SERPL CALC-MCNC: 100 MG/DL
LYMPHOCYTES ABSOLUTE: 1.3 THOU/MM3 (ref 1–4.8)
LYMPHOCYTES NFR BLD AUTO: 36.2 %
MAGNESIUM SERPL-MCNC: 2 MG/DL (ref 1.6–2.6)
MCH RBC QN AUTO: 31.1 PG (ref 26–33)
MCHC RBC AUTO-ENTMCNC: 33.7 GM/DL (ref 32.2–35.5)
MCV RBC AUTO: 92.5 FL (ref 81–99)
MICROALBUMIN UR-MCNC: < 2 MG/DL
MICROALBUMIN/CREAT RATIO PNL UR: 26 MG/G (ref 0–30)
MONOCYTES ABSOLUTE: 0.2 THOU/MM3 (ref 0.4–1.3)
MONOCYTES NFR BLD AUTO: 6.2 %
NEUTROPHILS ABSOLUTE: 1.9 THOU/MM3 (ref 1.8–7.7)
NEUTROPHILS NFR BLD AUTO: 52.4 %
NRBC BLD AUTO-RTO: 0 /100 WBC
PLATELET # BLD AUTO: 206 THOU/MM3 (ref 130–400)
PMV BLD AUTO: 9.6 FL (ref 9.4–12.4)
POTASSIUM SERPL-SCNC: 5.1 MEQ/L (ref 3.5–5.2)
PROT SERPL-MCNC: 6.6 G/DL (ref 6.4–8.3)
RBC # BLD AUTO: 4.27 MILL/MM3 (ref 4.2–5.4)
SODIUM SERPL-SCNC: 136 MEQ/L (ref 135–145)
T4 FREE SERPL-MCNC: 1 NG/DL (ref 0.92–1.68)
TRIGL SERPL-MCNC: 98 MG/DL (ref 0–199)
TSH SERPL DL<=0.05 MIU/L-ACNC: 4.38 UIU/ML (ref 0.27–4.2)
VIT B12 SERPL-MCNC: 446 PG/ML (ref 232–1245)
WBC # BLD AUTO: 3.7 THOU/MM3 (ref 4.8–10.8)

## 2025-07-22 PROCEDURE — 85025 COMPLETE CBC W/AUTO DIFF WBC: CPT

## 2025-07-22 PROCEDURE — 82607 VITAMIN B-12: CPT

## 2025-07-22 PROCEDURE — 84443 ASSAY THYROID STIM HORMONE: CPT

## 2025-07-22 PROCEDURE — 83036 HEMOGLOBIN GLYCOSYLATED A1C: CPT

## 2025-07-22 PROCEDURE — 36415 COLL VENOUS BLD VENIPUNCTURE: CPT

## 2025-07-22 PROCEDURE — 84439 ASSAY OF FREE THYROXINE: CPT

## 2025-07-22 PROCEDURE — 83735 ASSAY OF MAGNESIUM: CPT

## 2025-07-22 PROCEDURE — 80053 COMPREHEN METABOLIC PANEL: CPT

## 2025-07-22 PROCEDURE — 80061 LIPID PANEL: CPT

## 2025-07-22 PROCEDURE — 82043 UR ALBUMIN QUANTITATIVE: CPT

## 2025-07-26 SDOH — HEALTH STABILITY: PHYSICAL HEALTH: ON AVERAGE, HOW MANY MINUTES DO YOU ENGAGE IN EXERCISE AT THIS LEVEL?: 50 MIN

## 2025-07-26 SDOH — HEALTH STABILITY: PHYSICAL HEALTH: ON AVERAGE, HOW MANY DAYS PER WEEK DO YOU ENGAGE IN MODERATE TO STRENUOUS EXERCISE (LIKE A BRISK WALK)?: 5 DAYS

## 2025-07-26 ASSESSMENT — LIFESTYLE VARIABLES
HOW MANY STANDARD DRINKS CONTAINING ALCOHOL DO YOU HAVE ON A TYPICAL DAY: 0
HOW OFTEN DO YOU HAVE A DRINK CONTAINING ALCOHOL: NEVER
HOW OFTEN DO YOU HAVE SIX OR MORE DRINKS ON ONE OCCASION: 1
HOW OFTEN DO YOU HAVE A DRINK CONTAINING ALCOHOL: 1
HOW MANY STANDARD DRINKS CONTAINING ALCOHOL DO YOU HAVE ON A TYPICAL DAY: PATIENT DOES NOT DRINK

## 2025-07-26 ASSESSMENT — PATIENT HEALTH QUESTIONNAIRE - PHQ9
1. LITTLE INTEREST OR PLEASURE IN DOING THINGS: NOT AT ALL
SUM OF ALL RESPONSES TO PHQ QUESTIONS 1-9: 0
2. FEELING DOWN, DEPRESSED OR HOPELESS: NOT AT ALL
SUM OF ALL RESPONSES TO PHQ QUESTIONS 1-9: 0

## 2025-07-28 DIAGNOSIS — F11.90 CHRONIC, CONTINUOUS USE OF OPIOIDS: ICD-10-CM

## 2025-07-28 RX ORDER — TRAMADOL HYDROCHLORIDE 50 MG/1
50 TABLET ORAL EVERY 6 HOURS PRN
Qty: 60 TABLET | Refills: 0 | Status: SHIPPED | OUTPATIENT
Start: 2025-07-28 | End: 2025-10-26

## 2025-07-28 NOTE — TELEPHONE ENCOUNTER
Heaven Zavala called requesting a refill on the following medications:  Requested Prescriptions     Pending Prescriptions Disp Refills    traMADol (ULTRAM) 50 MG tablet 60 tablet 5     Sig: Take 1 tablet by mouth every 6 hours as needed for Pain for up to 90 days. Max Daily Amount: 200 mg       Date of last visit: 1/29/2025  Date of next visit (if applicable):8/1/2025  Date of last refill: 1/29/25  Pharmacy Name: [facility] LPN

## 2025-08-01 ENCOUNTER — OFFICE VISIT (OUTPATIENT)
Dept: FAMILY MEDICINE CLINIC | Age: 75
End: 2025-08-01

## 2025-08-01 VITALS
DIASTOLIC BLOOD PRESSURE: 76 MMHG | HEART RATE: 62 BPM | WEIGHT: 198 LBS | HEIGHT: 67 IN | SYSTOLIC BLOOD PRESSURE: 132 MMHG | OXYGEN SATURATION: 98 % | BODY MASS INDEX: 31.08 KG/M2

## 2025-08-01 DIAGNOSIS — E78.00 PURE HYPERCHOLESTEROLEMIA: Chronic | ICD-10-CM

## 2025-08-01 DIAGNOSIS — M19.90 ARTHRITIS: Chronic | ICD-10-CM

## 2025-08-01 DIAGNOSIS — Z00.00 MEDICARE ANNUAL WELLNESS VISIT, SUBSEQUENT: Primary | ICD-10-CM

## 2025-08-01 DIAGNOSIS — F11.90 CHRONIC, CONTINUOUS USE OF OPIOIDS: ICD-10-CM

## 2025-08-01 DIAGNOSIS — E11.9 TYPE 2 DIABETES MELLITUS WITHOUT COMPLICATION, WITHOUT LONG-TERM CURRENT USE OF INSULIN (HCC): ICD-10-CM

## 2025-08-01 DIAGNOSIS — M19.041 ARTHRITIS OF BOTH HANDS: ICD-10-CM

## 2025-08-01 DIAGNOSIS — Z12.11 SCREEN FOR COLON CANCER: ICD-10-CM

## 2025-08-01 DIAGNOSIS — I10 ESSENTIAL HYPERTENSION: Chronic | ICD-10-CM

## 2025-08-01 DIAGNOSIS — M19.042 ARTHRITIS OF BOTH HANDS: ICD-10-CM

## 2025-08-01 DIAGNOSIS — N39.46 MIXED INCONTINENCE URGE AND STRESS: ICD-10-CM

## 2025-08-01 LAB
BILIRUBIN, POC: NEGATIVE
BLOOD URINE, POC: NEGATIVE
CLARITY, POC: CLEAR
COLOR, POC: YELLOW
GLUCOSE URINE, POC: NEGATIVE MG/DL
KETONES, POC: NEGATIVE MG/DL
LEUKOCYTE EST, POC: NORMAL
NITRITE, POC: NEGATIVE
PH, POC: 6.5
PROTEIN, POC: NEGATIVE MG/DL
SPECIFIC GRAVITY, POC: >=1.03
UROBILINOGEN, POC: NEGATIVE MG/DL

## 2025-08-05 RX ORDER — CELECOXIB 200 MG/1
200 CAPSULE ORAL 2 TIMES DAILY
Qty: 180 CAPSULE | Refills: 3 | Status: SHIPPED | OUTPATIENT
Start: 2025-08-05

## 2025-08-05 RX ORDER — TRAMADOL HYDROCHLORIDE 50 MG/1
50 TABLET ORAL EVERY 6 HOURS PRN
Qty: 60 TABLET | Refills: 5 | Status: SHIPPED | OUTPATIENT
Start: 2025-08-05 | End: 2025-11-03

## 2025-08-12 ENCOUNTER — HOSPITAL ENCOUNTER (OUTPATIENT)
Dept: PHYSICAL THERAPY | Age: 75
Setting detail: THERAPIES SERIES
Discharge: HOME | End: 2025-08-12
Attending: FAMILY MEDICINE
Payer: MEDICARE

## 2025-08-12 PROCEDURE — 97530 THERAPEUTIC ACTIVITIES: CPT

## 2025-08-12 PROCEDURE — 97162 PT EVAL MOD COMPLEX 30 MIN: CPT

## 2025-08-18 ENCOUNTER — APPOINTMENT (OUTPATIENT)
Dept: PHYSICAL THERAPY | Age: 75
End: 2025-08-18
Attending: FAMILY MEDICINE
Payer: MEDICARE

## 2025-08-25 ENCOUNTER — HOSPITAL ENCOUNTER (OUTPATIENT)
Dept: PHYSICAL THERAPY | Age: 75
Setting detail: THERAPIES SERIES
Discharge: HOME | End: 2025-08-25
Attending: FAMILY MEDICINE
Payer: MEDICARE

## 2025-08-25 PROCEDURE — 97530 THERAPEUTIC ACTIVITIES: CPT

## 2025-08-26 LAB — NONINV COLON CA DNA+OCC BLD SCRN STL QL: POSITIVE

## 2025-08-29 ENCOUNTER — RESULTS FOLLOW-UP (OUTPATIENT)
Dept: FAMILY MEDICINE CLINIC | Age: 75
End: 2025-08-29

## 2025-08-29 DIAGNOSIS — R19.5 POSITIVE COLORECTAL CANCER SCREENING USING COLOGUARD TEST: Primary | ICD-10-CM

## 2025-09-02 ENCOUNTER — HOSPITAL ENCOUNTER (OUTPATIENT)
Dept: WOMENS IMAGING | Age: 75
Discharge: HOME | End: 2025-09-02
Payer: MEDICARE

## 2025-09-02 ENCOUNTER — HOSPITAL ENCOUNTER (OUTPATIENT)
Dept: PHYSICAL THERAPY | Age: 75
Setting detail: THERAPIES SERIES
Discharge: HOME | End: 2025-09-02
Attending: FAMILY MEDICINE
Payer: MEDICARE

## 2025-09-02 VITALS — BODY MASS INDEX: 31.01 KG/M2 | WEIGHT: 198 LBS

## 2025-09-02 DIAGNOSIS — Z12.31 VISIT FOR SCREENING MAMMOGRAM: ICD-10-CM

## 2025-09-02 PROCEDURE — 97140 MANUAL THERAPY 1/> REGIONS: CPT

## 2025-09-02 PROCEDURE — 77063 BREAST TOMOSYNTHESIS BI: CPT

## 2025-09-02 PROCEDURE — 97530 THERAPEUTIC ACTIVITIES: CPT

## (undated) DEVICE — BREAST HERNIA PACK: Brand: MEDLINE INDUSTRIES, INC.

## (undated) DEVICE — SOLUTION IV IRRIG POUR BRL 0.9% SODIUM CHL 2F7124

## (undated) DEVICE — GOWN,SIRUS,NON REINFRCD,LARGE,SET IN SL: Brand: MEDLINE

## (undated) DEVICE — SUTURE MCRYL SZ 4-0 L27IN ABSRB UD L19MM PS-2 1/2 CIR PRIM Y426H

## (undated) DEVICE — Device

## (undated) DEVICE — NEEDLE HYPO 25GA L1IN PIVOTING SHLD FOR LUERLOCK SYR ECLIPSE

## (undated) DEVICE — PACK PROCEDURE SURG POD SC SRHP LF

## (undated) DEVICE — GLOVE ORANGE PI 7 1/2   MSG9075

## (undated) DEVICE — SPECIMEN ORIENTATION CHARMS, SIX DISTINCTLY SHAPED STERILE 10MM CHARMS: Brand: MARGINMAP

## (undated) DEVICE — GLOVE SURG SZ 8 L11.77IN FNGR THK9.8MIL STRW LTX POLYMER

## (undated) DEVICE — SUTURE VCRL SZ 3-0 L27IN ABSRB UD FS-2 L19MM 1/2 CIR J423H

## (undated) DEVICE — PENCIL SMK EVAC ALL IN 1 DSGN ENH VISIBILITY IMPROVED AIR

## (undated) DEVICE — ADHESIVE SKIN CLSR 0.7ML TOP DERMBND ADV

## (undated) DEVICE — SUTURE VCRL + SZ 3-0 L27IN ABSRB UD L26MM SH 1/2 CIR VCP416H

## (undated) DEVICE — SUTURE VCRL SZ 2-0 L27IN ABSRB UD L26MM SH 1/2 CIR J417H

## (undated) DEVICE — IMPREGNATED GAUZE DRESSING: Brand: CUTICERIN 7.5X7.5CM CTN 50

## (undated) DEVICE — GLOVE ORANGE PI 7   MSG9070